# Patient Record
Sex: FEMALE | Race: WHITE | Employment: UNEMPLOYED | URBAN - METROPOLITAN AREA
[De-identification: names, ages, dates, MRNs, and addresses within clinical notes are randomized per-mention and may not be internally consistent; named-entity substitution may affect disease eponyms.]

---

## 2020-05-05 ENCOUNTER — HOSPITAL ENCOUNTER (EMERGENCY)
Age: 50
Discharge: HOME | End: 2020-05-05
Attending: EMERGENCY MEDICINE
Payer: MEDICAID

## 2020-05-05 ENCOUNTER — APPOINTMENT (OUTPATIENT)
Dept: GENERAL RADIOLOGY | Age: 50
End: 2020-05-05
Attending: EMERGENCY MEDICINE
Payer: MEDICAID

## 2020-05-05 ENCOUNTER — HOSPITAL ENCOUNTER (INPATIENT)
Age: 50
LOS: 3 days | Discharge: HOME | End: 2020-05-08
Attending: INTERNAL MEDICINE | Admitting: INTERNAL MEDICINE
Payer: MEDICAID

## 2020-05-05 VITALS
TEMPERATURE: 98 F | HEIGHT: 62 IN | HEART RATE: 104 BPM | WEIGHT: 135 LBS | DIASTOLIC BLOOD PRESSURE: 58 MMHG | BODY MASS INDEX: 24.84 KG/M2 | RESPIRATION RATE: 15 BRPM | OXYGEN SATURATION: 95 % | SYSTOLIC BLOOD PRESSURE: 90 MMHG

## 2020-05-05 DIAGNOSIS — F10.10 ALCOHOL ABUSE: Primary | ICD-10-CM

## 2020-05-05 DIAGNOSIS — F10.930 ALCOHOL WITHDRAWAL SYNDROME WITHOUT COMPLICATION (HCC): Primary | ICD-10-CM

## 2020-05-05 PROBLEM — F10.939 ALCOHOL WITHDRAWAL (HCC): Status: ACTIVE | Noted: 2020-05-05

## 2020-05-05 LAB
ALBUMIN SERPL-MCNC: 4.6 G/DL (ref 3.5–5)
ALBUMIN/GLOB SERPL: 1.2 {RATIO} (ref 1.2–3.5)
ALP SERPL-CCNC: 92 U/L (ref 50–130)
ALT SERPL-CCNC: 50 U/L (ref 12–65)
AMPHET UR QL SCN: NEGATIVE
ANION GAP SERPL CALC-SCNC: 19 MMOL/L (ref 7–16)
AST SERPL-CCNC: 71 U/L (ref 15–37)
ATRIAL RATE: 100 BPM
BARBITURATES UR QL SCN: NEGATIVE
BASOPHILS # BLD: 0 K/UL (ref 0–0.2)
BASOPHILS NFR BLD: 1 % (ref 0–2)
BENZODIAZ UR QL: NEGATIVE
BILIRUB SERPL-MCNC: 0.8 MG/DL (ref 0.2–1.1)
BUN SERPL-MCNC: 11 MG/DL (ref 6–23)
CALCIUM SERPL-MCNC: 9.5 MG/DL (ref 8.3–10.4)
CALCULATED P AXIS, ECG09: 67 DEGREES
CALCULATED R AXIS, ECG10: 39 DEGREES
CALCULATED T AXIS, ECG11: 75 DEGREES
CANNABINOIDS UR QL SCN: NEGATIVE
CHLORIDE SERPL-SCNC: 91 MMOL/L (ref 98–107)
CO2 SERPL-SCNC: 18 MMOL/L (ref 21–32)
COCAINE UR QL SCN: NEGATIVE
CREAT SERPL-MCNC: 0.71 MG/DL (ref 0.6–1)
DIAGNOSIS, 93000: NORMAL
DIFFERENTIAL METHOD BLD: ABNORMAL
EOSINOPHIL # BLD: 0 K/UL (ref 0–0.8)
EOSINOPHIL NFR BLD: 1 % (ref 0.5–7.8)
ERYTHROCYTE [DISTWIDTH] IN BLOOD BY AUTOMATED COUNT: 13.5 % (ref 11.9–14.6)
ETHANOL SERPL-MCNC: 51 MG/DL
GLOBULIN SER CALC-MCNC: 3.8 G/DL (ref 2.3–3.5)
GLUCOSE SERPL-MCNC: 79 MG/DL (ref 65–100)
HCG UR QL: NEGATIVE
HCT VFR BLD AUTO: 42.7 % (ref 35.8–46.3)
HGB BLD-MCNC: 15.2 G/DL (ref 11.7–15.4)
IMM GRANULOCYTES # BLD AUTO: 0 K/UL (ref 0–0.5)
IMM GRANULOCYTES NFR BLD AUTO: 0 % (ref 0–5)
LIPASE SERPL-CCNC: 435 U/L (ref 73–393)
LYMPHOCYTES # BLD: 2 K/UL (ref 0.5–4.6)
LYMPHOCYTES NFR BLD: 36 % (ref 13–44)
MCH RBC QN AUTO: 32.8 PG (ref 26.1–32.9)
MCHC RBC AUTO-ENTMCNC: 35.6 G/DL (ref 31.4–35)
MCV RBC AUTO: 92 FL (ref 79.6–97.8)
METHADONE UR QL: NEGATIVE
MONOCYTES # BLD: 0.4 K/UL (ref 0.1–1.3)
MONOCYTES NFR BLD: 7 % (ref 4–12)
NEUTS SEG # BLD: 3 K/UL (ref 1.7–8.2)
NEUTS SEG NFR BLD: 55 % (ref 43–78)
NRBC # BLD: 0 K/UL (ref 0–0.2)
OPIATES UR QL: NEGATIVE
P-R INTERVAL, ECG05: 156 MS
PCP UR QL: NEGATIVE
PLATELET # BLD AUTO: 204 K/UL (ref 150–450)
PMV BLD AUTO: 9.4 FL (ref 9.4–12.3)
POTASSIUM SERPL-SCNC: 3.3 MMOL/L (ref 3.5–5.1)
PROT SERPL-MCNC: 8.4 G/DL (ref 6.3–8.2)
Q-T INTERVAL, ECG07: 398 MS
QRS DURATION, ECG06: 78 MS
QTC CALCULATION (BEZET), ECG08: 513 MS
RBC # BLD AUTO: 4.64 M/UL (ref 4.05–5.2)
SODIUM SERPL-SCNC: 128 MMOL/L (ref 136–145)
T4 FREE SERPL-MCNC: 0.6 NG/DL (ref 0.78–1.46)
TSH SERPL DL<=0.005 MIU/L-ACNC: 70.5 UIU/ML
VENTRICULAR RATE, ECG03: 100 BPM
WBC # BLD AUTO: 5.5 K/UL (ref 4.3–11.1)

## 2020-05-05 PROCEDURE — 81003 URINALYSIS AUTO W/O SCOPE: CPT

## 2020-05-05 PROCEDURE — 84439 ASSAY OF FREE THYROXINE: CPT

## 2020-05-05 PROCEDURE — 65270000029 HC RM PRIVATE

## 2020-05-05 PROCEDURE — 74018 RADEX ABDOMEN 1 VIEW: CPT

## 2020-05-05 PROCEDURE — 74011250636 HC RX REV CODE- 250/636: Performed by: INTERNAL MEDICINE

## 2020-05-05 PROCEDURE — 80053 COMPREHEN METABOLIC PANEL: CPT

## 2020-05-05 PROCEDURE — 83690 ASSAY OF LIPASE: CPT

## 2020-05-05 PROCEDURE — 99285 EMERGENCY DEPT VISIT HI MDM: CPT

## 2020-05-05 PROCEDURE — 74011250636 HC RX REV CODE- 250/636: Performed by: EMERGENCY MEDICINE

## 2020-05-05 PROCEDURE — 74011000250 HC RX REV CODE- 250: Performed by: INTERNAL MEDICINE

## 2020-05-05 PROCEDURE — 80307 DRUG TEST PRSMV CHEM ANLYZR: CPT

## 2020-05-05 PROCEDURE — 81025 URINE PREGNANCY TEST: CPT

## 2020-05-05 PROCEDURE — 96366 THER/PROPH/DIAG IV INF ADDON: CPT

## 2020-05-05 PROCEDURE — 74011000258 HC RX REV CODE- 258: Performed by: INTERNAL MEDICINE

## 2020-05-05 PROCEDURE — 96361 HYDRATE IV INFUSION ADD-ON: CPT

## 2020-05-05 PROCEDURE — 96365 THER/PROPH/DIAG IV INF INIT: CPT

## 2020-05-05 PROCEDURE — 74011000258 HC RX REV CODE- 258: Performed by: EMERGENCY MEDICINE

## 2020-05-05 PROCEDURE — 85025 COMPLETE CBC W/AUTO DIFF WBC: CPT

## 2020-05-05 PROCEDURE — 84443 ASSAY THYROID STIM HORMONE: CPT

## 2020-05-05 PROCEDURE — 96375 TX/PRO/DX INJ NEW DRUG ADDON: CPT

## 2020-05-05 PROCEDURE — 93005 ELECTROCARDIOGRAM TRACING: CPT | Performed by: EMERGENCY MEDICINE

## 2020-05-05 PROCEDURE — 74011250637 HC RX REV CODE- 250/637: Performed by: INTERNAL MEDICINE

## 2020-05-05 PROCEDURE — 96367 TX/PROPH/DG ADDL SEQ IV INF: CPT

## 2020-05-05 RX ORDER — FOLIC ACID 1 MG/1
1 TABLET ORAL DAILY
Status: DISCONTINUED | OUTPATIENT
Start: 2020-05-05 | End: 2020-05-08 | Stop reason: HOSPADM

## 2020-05-05 RX ORDER — LORAZEPAM 2 MG/ML
1 INJECTION INTRAMUSCULAR
Status: COMPLETED | OUTPATIENT
Start: 2020-05-05 | End: 2020-05-05

## 2020-05-05 RX ORDER — LORAZEPAM 1 MG/1
2 TABLET ORAL
Status: DISCONTINUED | OUTPATIENT
Start: 2020-05-05 | End: 2020-05-08 | Stop reason: HOSPADM

## 2020-05-05 RX ORDER — METOCLOPRAMIDE HYDROCHLORIDE 5 MG/ML
10 INJECTION INTRAMUSCULAR; INTRAVENOUS
Status: COMPLETED | OUTPATIENT
Start: 2020-05-05 | End: 2020-05-05

## 2020-05-05 RX ORDER — LEVOTHYROXINE SODIUM 112 UG/1
112 TABLET ORAL DAILY
COMMUNITY
Start: 2019-02-08

## 2020-05-05 RX ORDER — AMOXICILLIN 250 MG
2 CAPSULE ORAL DAILY
Status: DISCONTINUED | OUTPATIENT
Start: 2020-05-06 | End: 2020-05-08 | Stop reason: HOSPADM

## 2020-05-05 RX ORDER — POLYETHYLENE GLYCOL 3350 17 G/17G
17 POWDER, FOR SOLUTION ORAL DAILY
Status: DISCONTINUED | OUTPATIENT
Start: 2020-05-06 | End: 2020-05-08 | Stop reason: HOSPADM

## 2020-05-05 RX ORDER — LORAZEPAM 1 MG/1
1 TABLET ORAL
Status: DISCONTINUED | OUTPATIENT
Start: 2020-05-05 | End: 2020-05-08 | Stop reason: HOSPADM

## 2020-05-05 RX ORDER — SODIUM CHLORIDE, SODIUM LACTATE, POTASSIUM CHLORIDE, CALCIUM CHLORIDE 600; 310; 30; 20 MG/100ML; MG/100ML; MG/100ML; MG/100ML
75 INJECTION, SOLUTION INTRAVENOUS CONTINUOUS
Status: DISCONTINUED | OUTPATIENT
Start: 2020-05-05 | End: 2020-05-08 | Stop reason: HOSPADM

## 2020-05-05 RX ORDER — ONDANSETRON 2 MG/ML
4 INJECTION INTRAMUSCULAR; INTRAVENOUS
Status: COMPLETED | OUTPATIENT
Start: 2020-05-05 | End: 2020-05-05

## 2020-05-05 RX ORDER — POTASSIUM CHLORIDE 20 MEQ/1
40 TABLET, EXTENDED RELEASE ORAL
Status: COMPLETED | OUTPATIENT
Start: 2020-05-05 | End: 2020-05-05

## 2020-05-05 RX ORDER — ONDANSETRON 2 MG/ML
4 INJECTION INTRAMUSCULAR; INTRAVENOUS
Status: DISCONTINUED | OUTPATIENT
Start: 2020-05-05 | End: 2020-05-08 | Stop reason: HOSPADM

## 2020-05-05 RX ORDER — LORAZEPAM 2 MG/ML
2 INJECTION INTRAMUSCULAR
Status: DISCONTINUED | OUTPATIENT
Start: 2020-05-05 | End: 2020-05-08 | Stop reason: HOSPADM

## 2020-05-05 RX ORDER — DESVENLAFAXINE SUCCINATE 50 MG/1
50 TABLET, EXTENDED RELEASE ORAL
COMMUNITY

## 2020-05-05 RX ORDER — LANOLIN ALCOHOL/MO/W.PET/CERES
100 CREAM (GRAM) TOPICAL DAILY
Status: DISCONTINUED | OUTPATIENT
Start: 2020-05-06 | End: 2020-05-08 | Stop reason: HOSPADM

## 2020-05-05 RX ORDER — THIAMINE HYDROCHLORIDE 100 MG/ML
500 INJECTION, SOLUTION INTRAMUSCULAR; INTRAVENOUS
Status: DISCONTINUED | OUTPATIENT
Start: 2020-05-05 | End: 2020-05-05 | Stop reason: ALTCHOICE

## 2020-05-05 RX ORDER — HALOPERIDOL 5 MG/ML
2 INJECTION INTRAMUSCULAR
Status: DISCONTINUED | OUTPATIENT
Start: 2020-05-05 | End: 2020-05-06

## 2020-05-05 RX ORDER — DEXTROSE, SODIUM CHLORIDE, SODIUM LACTATE, POTASSIUM CHLORIDE, AND CALCIUM CHLORIDE 5; .6; .31; .03; .02 G/100ML; G/100ML; G/100ML; G/100ML; G/100ML
150 INJECTION, SOLUTION INTRAVENOUS CONTINUOUS
Status: DISCONTINUED | OUTPATIENT
Start: 2020-05-05 | End: 2020-05-05 | Stop reason: HOSPADM

## 2020-05-05 RX ADMIN — SODIUM CHLORIDE, SODIUM LACTATE, POTASSIUM CHLORIDE, AND CALCIUM CHLORIDE 150 ML/HR: 600; 310; 30; 20 INJECTION, SOLUTION INTRAVENOUS at 13:35

## 2020-05-05 RX ADMIN — FOLIC ACID 1 MG: 1 TABLET ORAL at 13:54

## 2020-05-05 RX ADMIN — LORAZEPAM 1 MG: 2 INJECTION INTRAMUSCULAR; INTRAVENOUS at 08:59

## 2020-05-05 RX ADMIN — SODIUM CHLORIDE, SODIUM LACTATE, POTASSIUM CHLORIDE, CALCIUM CHLORIDE, AND DEXTROSE MONOHYDRATE 150 ML/HR: 600; 310; 30; 20; 5 INJECTION, SOLUTION INTRAVENOUS at 09:17

## 2020-05-05 RX ADMIN — POTASSIUM CHLORIDE 40 MEQ: 20 TABLET, EXTENDED RELEASE ORAL at 13:54

## 2020-05-05 RX ADMIN — FOLIC ACID: 5 INJECTION, SOLUTION INTRAMUSCULAR; INTRAVENOUS; SUBCUTANEOUS at 15:08

## 2020-05-05 RX ADMIN — SODIUM CHLORIDE, SODIUM LACTATE, POTASSIUM CHLORIDE, AND CALCIUM CHLORIDE 150 ML/HR: 600; 310; 30; 20 INJECTION, SOLUTION INTRAVENOUS at 21:47

## 2020-05-05 RX ADMIN — METOCLOPRAMIDE 10 MG: 5 INJECTION, SOLUTION INTRAMUSCULAR; INTRAVENOUS at 08:58

## 2020-05-05 RX ADMIN — ONDANSETRON 4 MG: 2 INJECTION INTRAMUSCULAR; INTRAVENOUS at 06:33

## 2020-05-05 RX ADMIN — SODIUM CHLORIDE 1000 ML: 900 INJECTION, SOLUTION INTRAVENOUS at 06:33

## 2020-05-05 RX ADMIN — THIAMINE HYDROCHLORIDE 500 MG: 100 INJECTION, SOLUTION INTRAMUSCULAR; INTRAVENOUS at 09:00

## 2020-05-05 NOTE — ED NOTES
Request for bed downtown for admission. Pt resting on stretcher and is aware of care. Denies complaints at present. Pt not able to eat at this time, which RN has told her that.

## 2020-05-05 NOTE — ED TRIAGE NOTES
Pt came by ems. Mask placed on pt. Pt c/o etoh withdrawal. Also c/o SI and abd pain, pt has hx of bowel obstruction and thinks she may have one now.  Pt states she has vomiting for days and no bm in 10 days

## 2020-05-05 NOTE — PROGRESS NOTES
TRANSFER - IN REPORT:    Verbal report received from Stefano N Paco Prasad RN(name) on Zulma Michel  being received from ES(unit) for routine progression of care      Report consisted of patients Situation, Background, Assessment and   Recommendations(SBAR). Information from the following report(s) SBAR was reviewed with the receiving nurse. Opportunity for questions and clarification was provided. Assessment completed upon patients arrival to unit and care assumed.

## 2020-05-05 NOTE — PROGRESS NOTES
CM received consult to assist with ETOH Abuse, needs, and resources. CM attempted to meet with patient to complete assessment. Patient was resting at this time. CM introduced role of CM. Patient does not feel it's a good time to talk and requested CM to follow up at a later time. CM was receptive to this request. CM also informed  [facility] Psych Consult Note is needed. Romelia Albrecht will contact [facility] ED, to request documents to be sent. CM will follow up with patient at appropriate timing. Care Management Interventions  Mode of Transport at Discharge:  Other (see comment)(TBD: Based upon need. )  Transition of Care Consult (CM Consult): Discharge Planning  Discharge Durable Medical Equipment: No  Physical Therapy Consult: No  Occupational Therapy Consult: No  Speech Therapy Consult: No  Current Support Network: Own Home  Discharge Location  Discharge Placement: Unable to determine at this time

## 2020-05-05 NOTE — PROGRESS NOTES
initial visit, met with patient in room, offered support, assurance of continued prayers. Patient states that she is not feeling well, but that she is \"better than yesterday. \"    Tori Sumner  Chaplain WARREN

## 2020-05-05 NOTE — ED NOTES
Pt continues to wait for transport to [facility] for admission. Pt remains on monitors and resting comfortable at this time.

## 2020-05-05 NOTE — H&P
HOSPITALIST H&P/CONSULT  NAME:  Latisha Castillo   Age:  52 y.o.  :   1970   MRN:   [id number]  PCP: None  Consulting MD:  Treatment Team: Attending Provider: Aurea Jacobo MD  HPI:   Pt is a 52 y.o. female with alcoholism, hypothyroidism, h/o alcoholic pancreatitis who presents to the ED with concern for EtOH withdrawal.    Pt reports she is severely depressed and has been on an alcohol binge for at least the past 7 days, drinking a fifth of liquor daily. Last drink was yesterday evening. She awoke this morning with tremors, nausea, diffuse abd pain. Very little PO intake over past 5-6 days. Also with constipation, no BM in more than a week. She reports no active plan to kill herself but does wish she \"would just die\" and was hoping to drink herself to death. In ED, -110s, BP good. Na 128, K 3.3. Lipase 435, TSH 70.5. KUB without acute findings. 10 point ROS done and is as stated in HPI or otherwise negative  Past Medical History:   Diagnosis Date    Gastrointestinal disorder     pancreatitis , bowel obstruction    Hypertension     Ill-defined condition     alcholism     Psychiatric disorder     depression      No past surgical history on file. Cannot display prior to admission medications because the patient has not been admitted in this contact. No Known Allergies   Social History     Tobacco Use    Smoking status: Not on file   Substance Use Topics    Alcohol use: Yes     Comment: daily      Family history reviewed and not pertinent. All history personally reviewed  Objective:   No data found. Physical Exam:  General:    Alert, cooperative, no distress, appears stated age. Head:   Normocephalic, without obvious abnormality, atraumatic. Nose:  Nares normal. No drainage or sinus tenderness. Lungs:   Clear to auscultation bilaterally. No Wheezing or Rhonchi. No rales. Heart:   Tachycardic rate 100s,  no murmur, rub or gallop.   Abdomen:   Soft, mild diffuse abd pain, no rebound or guarding  Extremities: No cyanosis. No edema. No clubbing  Skin:     Texture, turgor normal. No rashes or lesions. Not Jaundiced  Neurologic: Alert and oriented x 3, no focal deficits   Psych:  Flat affect    Data Review:   Recent Results (from the past 24 hour(s))   CBC WITH AUTOMATED DIFF    Collection Time: 05/05/20  6:32 AM   Result Value Ref Range    WBC 5.5 4.3 - 11.1 K/uL    RBC 4.64 4.05 - 5.2 M/uL    HGB 15.2 11.7 - 15.4 g/dL    HCT 42.7 35.8 - 46.3 %    MCV 92.0 79.6 - 97.8 FL    MCH 32.8 26.1 - 32.9 PG    MCHC 35.6 (H) 31.4 - 35.0 g/dL    RDW 13.5 11.9 - 14.6 %    PLATELET 299 565 - 601 K/uL    MPV 9.4 9.4 - 12.3 FL    ABSOLUTE NRBC 0.00 0.0 - 0.2 K/uL    DF AUTOMATED      NEUTROPHILS 55 43 - 78 %    LYMPHOCYTES 36 13 - 44 %    MONOCYTES 7 4.0 - 12.0 %    EOSINOPHILS 1 0.5 - 7.8 %    BASOPHILS 1 0.0 - 2.0 %    IMMATURE GRANULOCYTES 0 0.0 - 5.0 %    ABS. NEUTROPHILS 3.0 1.7 - 8.2 K/UL    ABS. LYMPHOCYTES 2.0 0.5 - 4.6 K/UL    ABS. MONOCYTES 0.4 0.1 - 1.3 K/UL    ABS. EOSINOPHILS 0.0 0.0 - 0.8 K/UL    ABS. BASOPHILS 0.0 0.0 - 0.2 K/UL    ABS. IMM. GRANS. 0.0 0.0 - 0.5 K/UL   METABOLIC PANEL, COMPREHENSIVE    Collection Time: 05/05/20  6:32 AM   Result Value Ref Range    Sodium 128 (L) 136 - 145 mmol/L    Potassium 3.3 (L) 3.5 - 5.1 mmol/L    Chloride 91 (L) 98 - 107 mmol/L    CO2 18 (L) 21 - 32 mmol/L    Anion gap 19 (H) 7 - 16 mmol/L    Glucose 79 65 - 100 mg/dL    BUN 11 6 - 23 MG/DL    Creatinine 0.71 0.6 - 1.0 MG/DL    GFR est AA >60 >60 ml/min/1.73m2    GFR est non-AA >60 >60 ml/min/1.73m2    Calcium 9.5 8.3 - 10.4 MG/DL    Bilirubin, total 0.8 0.2 - 1.1 MG/DL    ALT (SGPT) 50 12 - 65 U/L    AST (SGOT) 71 (H) 15 - 37 U/L    Alk.  phosphatase 92 50 - 130 U/L    Protein, total 8.4 (H) 6.3 - 8.2 g/dL    Albumin 4.6 3.5 - 5.0 g/dL    Globulin 3.8 (H) 2.3 - 3.5 g/dL    A-G Ratio 1.2 1.2 - 3.5     LIPASE    Collection Time: 05/05/20  6:32 AM   Result Value Ref Range    Lipase 435 (H) 73 - 393 U/L   TSH 3RD GENERATION    Collection Time: 05/05/20  6:32 AM   Result Value Ref Range    TSH 70.500 uIU/mL   ETHYL ALCOHOL    Collection Time: 05/05/20  6:32 AM   Result Value Ref Range    ALCOHOL(ETHYL),SERUM 51 MG/DL   DRUG SCREEN, URINE    Collection Time: 05/05/20  9:09 AM   Result Value Ref Range    PCP(PHENCYCLIDINE) Negative      BENZODIAZEPINES Negative      COCAINE Negative      AMPHETAMINES Negative      METHADONE Negative      THC (TH-CANNABINOL) Negative      OPIATES Negative      BARBITURATES Negative     HCG URINE, QL. - POC    Collection Time: 05/05/20  9:13 AM   Result Value Ref Range    Pregnancy test,urine (POC) Negative NEG       Imaging Harlon Nancy /Studies   Xr Abd (kub)    Result Date: 5/5/2020  IMPRESSION: Unremarkable supine view the abdomen. Assessment and Plan:   There are no active hospital problems to display for this patient. EtOH withdrawal  EtOH abuse  - Ativan PRN  - IVFs  - banana bag x 3  - folate, thiamine supplementation  - SW consult  - counseled on EtOH cessation    Hyponatremia  Likely due to decreased PO intake. - IVFs  - follow Na    Minimally elevated lipase  Unclear if this truly represents pancreatitis. - no indication for additional imaging at this time  - monitor symptoms - if abd pain increasing, could consider CT, repeat lipase    Mild abd pain  KUB without significant findings. Possibly related to EtOH abuse, ? Mild pancreatitis. - follow exam and symptoms    Hypothyroidism  TSH 70.5. Has not taken her Synthroid in at least a week. - resume synthroid  - repeat TFTs in 4-6 weeks    Severe depression  No active plan for suicide.   - psych consult    Proph: Lovenox  Code status: full code, friend Gerson Marin is surrogate decision maker  Dispo: > 2 midnights  Risk: high    Signed By: Alberto Keith MD     May 5, 2020

## 2020-05-05 NOTE — ED NOTES
Report from [address] [place] [address]. Pt resting on stretcher and waiting for further orders.

## 2020-05-05 NOTE — ED PROVIDER NOTES
Patient is a chronic alcoholic. States for the past 5 days has not had anything to eat or drink besides alcohol and a small eggroll. Has had nausea and vomiting. No recent bowel movements. No dysuria or hematuria. No vaginal bleeding or discharge. Complains of diffuse abdominal pain. States she has been through withdrawals in the past.    The history is provided by the patient and the EMS personnel. No  was used. Alcohol Problem   Primary symptoms include: weakness. There areno confusion, no loss of consciousness and no seizures present at this time. This is a recurrent problem. The current episode started more than 2 days ago. The problem has been gradually worsening. Suspected agents include alcohol. Associated symptoms include nausea and vomiting. Pertinent negatives include no fever, no bladder incontinence and no bowel incontinence. No past medical history on file. No past surgical history on file. No family history on file.     Social History     Socioeconomic History    Marital status: Not on file     Spouse name: Not on file    Number of children: Not on file    Years of education: Not on file    Highest education level: Not on file   Occupational History    Not on file   Social Needs    Financial resource strain: Not on file    Food insecurity     Worry: Not on file     Inability: Not on file    Transportation needs     Medical: Not on file     Non-medical: Not on file   Tobacco Use    Smoking status: Not on file   Substance and Sexual Activity    Alcohol use: Not on file    Drug use: Not on file    Sexual activity: Not on file   Lifestyle    Physical activity     Days per week: Not on file     Minutes per session: Not on file    Stress: Not on file   Relationships    Social connections     Talks on phone: Not on file     Gets together: Not on file     Attends [employer] service: Not on file     Active member of club or organization: Not on file Attends meetings of clubs or organizations: Not on file     Relationship status: Not on file    Intimate partner violence     Fear of current or ex partner: Not on file     Emotionally abused: Not on file     Physically abused: Not on file     Forced sexual activity: Not on file   Other Topics Concern    Not on file   Social History Narrative    Not on file         ALLERGIES: Patient has no known allergies. Review of Systems   Constitutional: Negative for chills and fever. HENT: Negative for rhinorrhea and sore throat. Eyes: Negative for pain and redness. Respiratory: Negative for chest tightness, shortness of breath and wheezing. Cardiovascular: Negative for chest pain and leg swelling. Gastrointestinal: Positive for abdominal pain, nausea and vomiting. Negative for bowel incontinence and diarrhea. Genitourinary: Negative for bladder incontinence, dysuria and hematuria. Musculoskeletal: Negative for back pain, gait problem, neck pain and neck stiffness. Skin: Negative for color change and rash. Neurological: Positive for weakness. Negative for seizures, loss of consciousness, numbness and headaches. Psychiatric/Behavioral: Negative for confusion. Vitals:    05/05/20 0619   BP: 114/84   Pulse: (!) 121   Resp: 18   SpO2: 98%            Physical Exam  Constitutional:       Appearance: Normal appearance. She is well-developed. HENT:      Head: Normocephalic and atraumatic. Neck:      Musculoskeletal: Normal range of motion and neck supple. Cardiovascular:      Rate and Rhythm: Regular rhythm. Tachycardia present. Pulmonary:      Effort: Pulmonary effort is normal.      Breath sounds: Normal breath sounds. Abdominal:      General: Bowel sounds are normal. There is no distension. Palpations: Abdomen is soft. Tenderness: There is abdominal tenderness. Musculoskeletal: Normal range of motion. General: No swelling. Skin:     General: Skin is warm and dry. Neurological:      General: No focal deficit present. Mental Status: She is alert and oriented to person, place, and time. MDM  Number of Diagnoses or Management Options  Diagnosis management comments: Pt is a chronic alcoholic with NV for past 5 days and diffuse abd pain. Unsure last intake of alcohol. Pt is concerned about withdrawal. No hallucinations. Will check labs and have oncoming doc follow up on results. Amount and/or Complexity of Data Reviewed  Clinical lab tests: ordered and reviewed  Tests in the radiology section of CPT®: ordered and reviewed  Tests in the medicine section of CPT®: ordered and reviewed    Patient Progress  Patient progress: stable    ===================================================================  I have received Zulma Michel from Dr. Melva Quiroga    We discussed the patient's complaint, presentation, vitals and differential diagnosis. We discussed the patient's current status, and response to treatments  We reviewed critical lab values, allergies, and other factors. Issues or problems that are still being evaluated are: alcohol abuse, poor nutrition    Assessment/Plan- await labs, reasses    Bonnie López MD; 5/5/2020 @7:00 AM  ===================================================================    ECG- reviewed in the absence of a cardiology by the ED Physician  Previous ECG: not available for examination  Interpretation: abnormal Quality: fair   Rate: 100 Rhythm: normal sinus rhythm   Ectopy: none QRS: prolonged   Conduction: normal ST segments: normal   Interpretation: NSR with prolonged QT  Bonnie López MD 7:05 AM         ED Course as of May 05 0949   Tue May 05, 2020   0329 Pt is depressed and frustrated; has vague suidical ideations, no plan, hoped the alcohol would just kill her    [GH]      ED Course User Index  [GH] River De La Torre MD       Procedures        Impression and Disposition:      Impression:     ICD-10-CM ICD-9-CM   1. Alcohol abuse F10.10 305.00        Condition at disposition: fair    Disposition: admit    Follow-up:   Follow-up Information    None          Discharge Medications:   Current Discharge Medication List           Miley Pascual MD; 5/5/2020 @7:00 AM

## 2020-05-05 NOTE — ED NOTES
Pt has talked to psych and continues to wait for a room downtown. Pt remains on monitors and waiting for further orders.

## 2020-05-05 NOTE — PROGRESS NOTES
05/05/20 1315   Dual Skin Pressure Injury Assessment   Dual Skin Pressure Injury Assessment WDL   Second Care Provider (Based on [address] [place] [address]) NISA Hodges   Skin Integumentary   Skin Integumentary (WDL) WDL

## 2020-05-05 NOTE — ROUTINE PROCESS
TRANSFER - OUT REPORT: 
 
Verbal report given to Fahad Vickers RN on [employer]  being transferred to [facility] routine progression of care Report consisted of patients Situation, Background, Assessment and  
Recommendations(SBAR). Information from the following report(s) ED Summary was reviewed with the receiving nurse. Lines:  
Peripheral IV 05/05/20 Left Antecubital (Active) Site Assessment Clean, dry, & intact 5/5/2020  6:32 AM  
Phlebitis Assessment 0 5/5/2020  6:32 AM  
Infiltration Assessment 0 5/5/2020  6:32 AM  
Dressing Status Clean, dry, & intact 5/5/2020  6:32 AM  
Dressing Type Transparent 5/5/2020  6:32 AM  
Hub Color/Line Status Patent; Flushed 5/5/2020  6:32 AM  
Action Taken Blood drawn 5/5/2020  6:32 AM  
  
 
Opportunity for questions and clarification was provided. Patient transported with: 
 Monitor with Ada Phillips

## 2020-05-06 PROBLEM — E03.9 ACQUIRED HYPOTHYROIDISM: Status: ACTIVE | Noted: 2020-05-06

## 2020-05-06 PROBLEM — R45.851 SUICIDAL IDEATION: Status: ACTIVE | Noted: 2020-05-06

## 2020-05-06 LAB
ANION GAP SERPL CALC-SCNC: 6 MMOL/L (ref 7–16)
APPEARANCE UR: NORMAL
BILIRUB UR QL: NEGATIVE
BUN SERPL-MCNC: 14 MG/DL (ref 6–23)
CALCIUM SERPL-MCNC: 8.6 MG/DL (ref 8.3–10.4)
CHLORIDE SERPL-SCNC: 104 MMOL/L (ref 98–107)
CO2 SERPL-SCNC: 27 MMOL/L (ref 21–32)
COLOR UR: YELLOW
CREAT SERPL-MCNC: 0.5 MG/DL (ref 0.6–1)
GLUCOSE SERPL-MCNC: 86 MG/DL (ref 65–100)
GLUCOSE UR STRIP.AUTO-MCNC: NEGATIVE MG/DL
HGB UR QL STRIP: NEGATIVE
KETONES UR QL STRIP.AUTO: NEGATIVE MG/DL
LEUKOCYTE ESTERASE UR QL STRIP.AUTO: NEGATIVE
MAGNESIUM SERPL-MCNC: 2.2 MG/DL (ref 1.8–2.4)
NITRITE UR QL STRIP.AUTO: NEGATIVE
PH UR STRIP: 6.5 [PH] (ref 5–9)
POTASSIUM SERPL-SCNC: 3.2 MMOL/L (ref 3.5–5.1)
PROT UR STRIP-MCNC: NEGATIVE MG/DL
SODIUM SERPL-SCNC: 137 MMOL/L (ref 136–145)
SP GR UR REFRACTOMETRY: 1.01 (ref 1–1.02)
UROBILINOGEN UR QL STRIP.AUTO: 0.2 EU/DL (ref 0.2–1)

## 2020-05-06 PROCEDURE — 74011250636 HC RX REV CODE- 250/636: Performed by: INTERNAL MEDICINE

## 2020-05-06 PROCEDURE — 74011250637 HC RX REV CODE- 250/637: Performed by: INTERNAL MEDICINE

## 2020-05-06 PROCEDURE — 81003 URINALYSIS AUTO W/O SCOPE: CPT

## 2020-05-06 PROCEDURE — 36415 COLL VENOUS BLD VENIPUNCTURE: CPT

## 2020-05-06 PROCEDURE — 80048 BASIC METABOLIC PNL TOTAL CA: CPT

## 2020-05-06 PROCEDURE — 83735 ASSAY OF MAGNESIUM: CPT

## 2020-05-06 PROCEDURE — 65270000029 HC RM PRIVATE

## 2020-05-06 RX ORDER — LEVOFLOXACIN 500 MG/1
500 TABLET, FILM COATED ORAL EVERY 24 HOURS
Status: DISCONTINUED | OUTPATIENT
Start: 2020-05-06 | End: 2020-05-08 | Stop reason: HOSPADM

## 2020-05-06 RX ORDER — CHLORDIAZEPOXIDE HYDROCHLORIDE 5 MG/1
10 CAPSULE, GELATIN COATED ORAL 4 TIMES DAILY
Status: DISCONTINUED | OUTPATIENT
Start: 2020-05-06 | End: 2020-05-08 | Stop reason: HOSPADM

## 2020-05-06 RX ORDER — POTASSIUM CHLORIDE 20 MEQ/1
20 TABLET, EXTENDED RELEASE ORAL 2 TIMES DAILY
Status: DISCONTINUED | OUTPATIENT
Start: 2020-05-06 | End: 2020-05-08 | Stop reason: HOSPADM

## 2020-05-06 RX ORDER — LEVOTHYROXINE SODIUM 112 UG/1
112 TABLET ORAL DAILY
Status: DISCONTINUED | OUTPATIENT
Start: 2020-05-06 | End: 2020-05-08 | Stop reason: HOSPADM

## 2020-05-06 RX ORDER — DESVENLAFAXINE SUCCINATE 50 MG/1
50 TABLET, EXTENDED RELEASE ORAL DAILY
Status: DISCONTINUED | OUTPATIENT
Start: 2020-05-07 | End: 2020-05-08 | Stop reason: HOSPADM

## 2020-05-06 RX ORDER — IBUPROFEN 200 MG
1 TABLET ORAL EVERY 24 HOURS
Status: DISCONTINUED | OUTPATIENT
Start: 2020-05-06 | End: 2020-05-08 | Stop reason: HOSPADM

## 2020-05-06 RX ADMIN — LORAZEPAM 2 MG: 2 INJECTION INTRAMUSCULAR; INTRAVENOUS at 08:50

## 2020-05-06 RX ADMIN — ONDANSETRON 4 MG: 2 INJECTION INTRAMUSCULAR; INTRAVENOUS at 04:56

## 2020-05-06 RX ADMIN — SODIUM CHLORIDE, SODIUM LACTATE, POTASSIUM CHLORIDE, AND CALCIUM CHLORIDE 150 ML/HR: 600; 310; 30; 20 INJECTION, SOLUTION INTRAVENOUS at 04:53

## 2020-05-06 RX ADMIN — CHLORDIAZEPOXIDE HYDROCHLORIDE 10 MG: 5 CAPSULE ORAL at 21:04

## 2020-05-06 RX ADMIN — LEVOFLOXACIN 500 MG: 500 TABLET, FILM COATED ORAL at 16:49

## 2020-05-06 RX ADMIN — LORAZEPAM 2 MG: 2 INJECTION INTRAMUSCULAR; INTRAVENOUS at 14:32

## 2020-05-06 RX ADMIN — POTASSIUM CHLORIDE 20 MEQ: 20 TABLET, EXTENDED RELEASE ORAL at 17:02

## 2020-05-06 RX ADMIN — POTASSIUM CHLORIDE 20 MEQ: 20 TABLET, EXTENDED RELEASE ORAL at 12:30

## 2020-05-06 RX ADMIN — CHLORDIAZEPOXIDE HYDROCHLORIDE 10 MG: 5 CAPSULE ORAL at 17:02

## 2020-05-06 RX ADMIN — ONDANSETRON 4 MG: 2 INJECTION INTRAMUSCULAR; INTRAVENOUS at 14:32

## 2020-05-06 RX ADMIN — Medication 100 MG: at 08:38

## 2020-05-06 RX ADMIN — POLYETHYLENE GLYCOL 3350 17 G: 17 POWDER, FOR SOLUTION ORAL at 08:38

## 2020-05-06 RX ADMIN — FOLIC ACID 1 MG: 1 TABLET ORAL at 08:38

## 2020-05-06 RX ADMIN — LORAZEPAM 2 MG: 2 INJECTION INTRAMUSCULAR; INTRAVENOUS at 22:19

## 2020-05-06 RX ADMIN — CHLORDIAZEPOXIDE HYDROCHLORIDE 10 MG: 5 CAPSULE ORAL at 08:37

## 2020-05-06 RX ADMIN — LACTULOSE 45 ML: 20 SOLUTION ORAL at 16:49

## 2020-05-06 RX ADMIN — SENNOSIDES AND DOCUSATE SODIUM 2 TABLET: 8.6; 5 TABLET ORAL at 08:38

## 2020-05-06 RX ADMIN — LEVOTHYROXINE SODIUM 112 MCG: 0.11 TABLET ORAL at 12:30

## 2020-05-06 RX ADMIN — CHLORDIAZEPOXIDE HYDROCHLORIDE 10 MG: 5 CAPSULE ORAL at 12:30

## 2020-05-06 RX ADMIN — LORAZEPAM 2 MG: 1 TABLET ORAL at 19:06

## 2020-05-06 NOTE — PROGRESS NOTES
Hourly rounds performed. All needs met. Bed is in low position and call light is within reach. Pt complained of nausea this AM. Nausea managed per MAR. Will continue to monitor and report oncoming nurse.

## 2020-05-06 NOTE — PROGRESS NOTES
Progress Note    Patient: Any Morrison MRN: [id number]  SSN: [id number]    YOB: 1970  Age: 52 y.o. Sex: female      Admit Date: 5/5/2020    LOS: 1 day     Subjective:     Patient with history of alcoholism, hypothyroidism, history of alcoholic pancreatitis. Admitted due to alcohol withdrawal and also stated that she had suidical thought and tried to drink herself to death. She still confirms with me today that she has suicidal thought now. She is eating well this morning. Objective:     Vitals:    05/05/20 2342 05/06/20 0345 05/06/20 0634 05/06/20 0808   BP: 100/61 127/74 116/78 117/75   Pulse: (!) 101 (!) 109 73 73   Resp: 18 18 16 19   Temp: 98 °F (36.7 °C) 98.4 °F (36.9 °C) 98.5 °F (36.9 °C) 98.1 °F (36.7 °C)   SpO2: 98% 96% 95% 96%        Intake and Output:  Current Shift: No intake/output data recorded. Last three shifts: 05/04 1901 - 05/06 0700  In: 240 [P.O.:240]  Out: -     Physical Exam:   General:                    The patient is a middle aged female in no acute distress. She is talking and answering well. She is eating. Sometimes, took time to answer. No tremor. Head:                                   Normocephalic/atraumatic. Eyes:                                   No palpebral pallor or scleral icterus. ENT:                                    External auricular and nasal exam within normal limits. Mucous membranes are moist.  Neck:                                   Supple, non-tender, no JVD. Lungs:                       Clear to auscultation bilaterally without wheezes or crackles. No respiratory distress or accessory muscle use. Heart:                                  Regular rate and rhythm, without murmurs, rubs, or gallops. Abdomen:                  Soft, non-tender, non-distended with normoactive bowel sounds.    Genitourinary:           No tenderness over the bladder or bilateral CVAs. Extremities:               Without clubbing, cyanosis, or edema. Skin:                                    Normal color, texture, and turgor. No rashes, lesions, or jaundice. Pulses:                      Radial and dorsalis pedis pulses present 2+ bilaterally. Capillary refill <2s. Neurologic:                CN II-XII grossly intact and symmetrical.                                               Moving all four extremities well with no focal deficits. Psychiatric:                Pleasant demeanor, appropriate affect. Alert and oriented x 3        Lab/Data Review:    Recent Results (from the past 24 hour(s))   METABOLIC PANEL, BASIC    Collection Time: 05/06/20  7:40 AM   Result Value Ref Range    Sodium 137 136 - 145 mmol/L    Potassium 3.2 (L) 3.5 - 5.1 mmol/L    Chloride 104 98 - 107 mmol/L    CO2 27 21 - 32 mmol/L    Anion gap 6 (L) 7 - 16 mmol/L    Glucose 86 65 - 100 mg/dL    BUN 14 6 - 23 MG/DL    Creatinine 0.50 (L) 0.6 - 1.0 MG/DL    GFR est AA >60 >60 ml/min/1.73m2    GFR est non-AA >60 >60 ml/min/1.73m2    Calcium 8.6 8.3 - 10.4 MG/DL   MAGNESIUM    Collection Time: 05/06/20  7:40 AM   Result Value Ref Range    Magnesium 2.2 1.8 - 2.4 mg/dL       XR abdomen   5-5-2020  IMPRESSION:     Unremarkable supine view the abdomen.       Current Facility-Administered Medications:     chlordiazePOXIDE (LIBRIUM) capsule 10 mg, 10 mg, Oral, QID, Maria M Willis MD, 10 mg at 05/06/20 0837    lactulose (CHRONULAC) 10 gram/15 mL solution 45 mL, 30 g, Oral, BID PRN, Ellen Leos MD    levothyroxine (SYNTHROID) tablet 112 mcg, 112 mcg, Oral, DAILY, Ellen Leos MD    LORazepam (ATIVAN) tablet 1 mg, 1 mg, Oral, Q1H PRN, Aditi Palafox MD    LORazepam (ATIVAN) tablet 2 mg, 2 mg, Oral, Q1H PRN, Aditi Palafox MD    LORazepam (ATIVAN) injection 2 mg, 2 mg, IntraVENous, Q2H PRN, Aditi Palafox MD    folic acid (Ebonie Minor) tablet 1 mg, 1 mg, Oral, DAILY, Carlos Palafox MD, 1 mg at 05/06/20 0838    thiamine HCL (B-1) tablet 100 mg, 100 mg, Oral, DAILY, Carlos Palafox MD, 100 mg at 05/06/20 0838    ondansetron (ZOFRAN) injection 4 mg, 4 mg, IntraVENous, Q4H PRN, Carlos Palafox MD, 4 mg at 05/06/20 0456    lactated Ringers infusion, 75 mL/hr, IntraVENous, CONTINUOUS, Ellen Leos MD, Last Rate: 150 mL/hr at 05/06/20 0453, 150 mL/hr at 05/06/20 0453    polyethylene glycol (MIRALAX) packet 17 g, 17 g, Oral, DAILY, Ellen Leos MD, 17 g at 05/06/20 0838    senna-docusate (PERICOLACE) 8.6-50 mg per tablet 2 Tab, 2 Tab, Oral, DAILY, Carlos Palafox MD, 2 Tab at 05/06/20 6174      Assessment:     Principal Problem:    Alcohol withdrawal ([facility] [address] (5/5/2020)    Active Problems:    Suicidal ideation (5/6/2020)      Acquired hypothyroidism (5/6/2020)        Plan:     Alcohol withdrawal on admission   Will continue with Librium   Give folic acid, thiamine. Monitor. Suicidal ideation   Ask psychiatry to see     Hypothyroidism   Continue home medications. Smoking. I advised patient to quit. Nicotine patch.        I have discussed the plan of care with patient and care team.    DVT prophylaxis : SCD     Signed By: Willie Villareal MD     May 6, 2020

## 2020-05-06 NOTE — PROGRESS NOTES
Patient on 1:1 with sitter. Patient very anxious and tearful this shift. Ativan given prn. Hourly rounds performed this shift. Bed lowered and locked, side rails x2, and call light in reach. All patient needs are met at this time. Bedside shift report will given to oncoming nurse.

## 2020-05-06 NOTE — PROGRESS NOTES
Interdisciplinary Rounds completed. Nursing, Case Management, and Physician  present. Plan of care reviewed and updated. Pt is a moderate suicide risk not requiring sitter, however, MD ordered 1:1 observation for safety.

## 2020-05-07 LAB
ANION GAP SERPL CALC-SCNC: 7 MMOL/L (ref 7–16)
BUN SERPL-MCNC: 8 MG/DL (ref 6–23)
CALCIUM SERPL-MCNC: 9.1 MG/DL (ref 8.3–10.4)
CHLORIDE SERPL-SCNC: 104 MMOL/L (ref 98–107)
CO2 SERPL-SCNC: 29 MMOL/L (ref 21–32)
CREAT SERPL-MCNC: 0.55 MG/DL (ref 0.6–1)
GLUCOSE SERPL-MCNC: 87 MG/DL (ref 65–100)
MAGNESIUM SERPL-MCNC: 2 MG/DL (ref 1.8–2.4)
POTASSIUM SERPL-SCNC: 3.5 MMOL/L (ref 3.5–5.1)
SODIUM SERPL-SCNC: 140 MMOL/L (ref 136–145)

## 2020-05-07 PROCEDURE — 74011250636 HC RX REV CODE- 250/636: Performed by: INTERNAL MEDICINE

## 2020-05-07 PROCEDURE — 83735 ASSAY OF MAGNESIUM: CPT

## 2020-05-07 PROCEDURE — 65270000029 HC RM PRIVATE

## 2020-05-07 PROCEDURE — 74011250637 HC RX REV CODE- 250/637: Performed by: INTERNAL MEDICINE

## 2020-05-07 PROCEDURE — 36415 COLL VENOUS BLD VENIPUNCTURE: CPT

## 2020-05-07 PROCEDURE — 80048 BASIC METABOLIC PNL TOTAL CA: CPT

## 2020-05-07 RX ADMIN — LORAZEPAM 2 MG: 2 INJECTION INTRAMUSCULAR; INTRAVENOUS at 08:35

## 2020-05-07 RX ADMIN — ONDANSETRON 4 MG: 2 INJECTION INTRAMUSCULAR; INTRAVENOUS at 19:06

## 2020-05-07 RX ADMIN — CHLORDIAZEPOXIDE HYDROCHLORIDE 10 MG: 5 CAPSULE ORAL at 22:00

## 2020-05-07 RX ADMIN — POTASSIUM CHLORIDE 20 MEQ: 20 TABLET, EXTENDED RELEASE ORAL at 08:19

## 2020-05-07 RX ADMIN — LORAZEPAM 2 MG: 2 INJECTION INTRAMUSCULAR; INTRAVENOUS at 23:53

## 2020-05-07 RX ADMIN — LORAZEPAM 2 MG: 2 INJECTION INTRAMUSCULAR; INTRAVENOUS at 12:03

## 2020-05-07 RX ADMIN — Medication 100 MG: at 08:19

## 2020-05-07 RX ADMIN — CHLORDIAZEPOXIDE HYDROCHLORIDE 10 MG: 5 CAPSULE ORAL at 08:18

## 2020-05-07 RX ADMIN — LEVOTHYROXINE SODIUM 112 MCG: 0.11 TABLET ORAL at 08:19

## 2020-05-07 RX ADMIN — POTASSIUM CHLORIDE 20 MEQ: 20 TABLET, EXTENDED RELEASE ORAL at 17:02

## 2020-05-07 RX ADMIN — ONDANSETRON 4 MG: 2 INJECTION INTRAMUSCULAR; INTRAVENOUS at 08:18

## 2020-05-07 RX ADMIN — SODIUM CHLORIDE, SODIUM LACTATE, POTASSIUM CHLORIDE, AND CALCIUM CHLORIDE 75 ML/HR: 600; 310; 30; 20 INJECTION, SOLUTION INTRAVENOUS at 05:53

## 2020-05-07 RX ADMIN — CHLORDIAZEPOXIDE HYDROCHLORIDE 10 MG: 5 CAPSULE ORAL at 17:02

## 2020-05-07 RX ADMIN — POLYETHYLENE GLYCOL 3350 17 G: 17 POWDER, FOR SOLUTION ORAL at 08:19

## 2020-05-07 RX ADMIN — ONDANSETRON 4 MG: 2 INJECTION INTRAMUSCULAR; INTRAVENOUS at 12:03

## 2020-05-07 RX ADMIN — FOLIC ACID 1 MG: 1 TABLET ORAL at 08:18

## 2020-05-07 RX ADMIN — LORAZEPAM 2 MG: 2 INJECTION INTRAMUSCULAR; INTRAVENOUS at 19:06

## 2020-05-07 RX ADMIN — SODIUM CHLORIDE, SODIUM LACTATE, POTASSIUM CHLORIDE, AND CALCIUM CHLORIDE 75 ML/HR: 600; 310; 30; 20 INJECTION, SOLUTION INTRAVENOUS at 20:00

## 2020-05-07 RX ADMIN — LEVOFLOXACIN 500 MG: 500 TABLET, FILM COATED ORAL at 14:20

## 2020-05-07 RX ADMIN — SENNOSIDES AND DOCUSATE SODIUM 2 TABLET: 8.6; 5 TABLET ORAL at 08:18

## 2020-05-07 NOTE — PROGRESS NOTES
Hourly rounds completed, pt denies needs at this time. Pt still with suicidal thoughts . Asking for Ativan and Zofran. Lunch time Librium held for sedation.

## 2020-05-07 NOTE — PROGRESS NOTES
CM met with patient to complete assessment. Patient presented alert and oriented. Demographic information confirmed by the patient. Patient states she was staying at [facility] on [address] prior to being admitted. Patient also informed CM, she is currently living out of her care. Patient states she been experiencing difficulty with completing ADL's. Patient states she does not have any support to assist with care needs. The patient receives her medications from [facility]. Patient voiced no difficulty with obtaining medications in the community. CM discussed discharge planning. Per chart review, consult to psychiatry was placed on 5/6/2020. Per psych consult note, the patient has been identified as a voluntary for initial legal status and has been recommended for inpatient psych hospitalization. CM asked if the patient would like any resources in reference to ETOH abuse. Patient declined services at this time and requested assistance with housing only. CM asked if the patient would potentially discharge to a shelter upon discharge. Patient states I guess but would like to have information regarding independent living. CM provided housing/shelter information. CM continues to follow the patient. Update: CM was notified by , patient requested to speak with CM. CM met with patient to discuss discharge needs. Patient states she will discharge to her friend's [facility], when medically stable. CM was receptive to this information. CM continues to follow the patient.

## 2020-05-07 NOTE — PROGRESS NOTES
Interdisciplinary Rounds completed. Nursing, Case Management, and Physician  present. Plan of care reviewed and updated. Probable d/c in am. MD wants to continue 1:1 sitters while hospitalized.

## 2020-05-07 NOTE — PROGRESS NOTES
Progress Note    Patient: Linda Mckenzie MRN: [id number]  SSN: [id number]    YOB: 1970  Age: 52 y.o. Sex: female      Admit Date: 5/5/2020    LOS: 2 days     Subjective:     Patient with history of alcoholism, hypothyroidism, history of alcoholic pancreatitis. Admitted due to alcohol withdrawal and also stated that she had suidical thought and tried to drink herself to death. Patient has been on one to one watch. Able to eat better. Sleep most of the time. Objective:     Vitals:    05/06/20 1854 05/06/20 2318 05/07/20 0447 05/07/20 0827   BP: 117/82 106/72 97/65 109/81   Pulse: 82 88 86 100   Resp: 16 16 16 15   Temp: 98.2 °F (36.8 °C) 98.2 °F (36.8 °C) 98 °F (36.7 °C) 97.4 °F (36.3 °C)   SpO2: 96% 97% 97% 98%        Intake and Output:  Current Shift: No intake/output data recorded. Last three shifts: 05/05 1901 - 05/07 0700  In: 240 [P.O.:240]  Out: 700 [Urine:700]    Physical Exam:   General:                    The patient is a middle aged female in no acute distress. She is talking and answering well. She is resting. Head:                                   Normocephalic/atraumatic. Eyes:                                   No palpebral pallor or scleral icterus. ENT:                                    External auricular and nasal exam within normal limits. Mucous membranes are moist.  Neck:                                   Supple, non-tender, no JVD. Lungs:                       Clear to auscultation bilaterally without wheezes or crackles. No respiratory distress or accessory muscle use. Heart:                                  Regular rate and rhythm, without murmurs, rubs, or gallops. Abdomen:                  Soft, non-tender, non-distended with normoactive bowel sounds. Genitourinary:           No tenderness over the bladder or bilateral CVAs.   Extremities: Without clubbing, cyanosis, or edema. Skin:                                    Normal color, texture, and turgor. No rashes, lesions, or jaundice. Pulses:                      Radial and dorsalis pedis pulses present 2+ bilaterally. Capillary refill <2s. Neurologic:                CN II-XII grossly intact and symmetrical.                                               Moving all four extremities well with no focal deficits. Psychiatric:                Pleasant demeanor, appropriate affect. Alert and oriented x 3        Lab/Data Review:    Recent Results (from the past 24 hour(s))   URINALYSIS W/ RFLX MICROSCOPIC    Collection Time: 05/06/20  2:50 PM   Result Value Ref Range    Color YELLOW      Appearance CLOUDY      Specific gravity 1.015 1.001 - 1.023      pH (UA) 6.5 5.0 - 9.0      Protein Negative NEG mg/dL    Glucose Negative mg/dL    Ketone Negative NEG mg/dL    Bilirubin Negative NEG      Blood Negative NEG      Urobilinogen 0.2 0.2 - 1.0 EU/dL    Nitrites Negative NEG      Leukocyte Esterase Negative NEG     METABOLIC PANEL, BASIC    Collection Time: 05/07/20  6:09 AM   Result Value Ref Range    Sodium 140 136 - 145 mmol/L    Potassium 3.5 3.5 - 5.1 mmol/L    Chloride 104 98 - 107 mmol/L    CO2 29 21 - 32 mmol/L    Anion gap 7 7 - 16 mmol/L    Glucose 87 65 - 100 mg/dL    BUN 8 6 - 23 MG/DL    Creatinine 0.55 (L) 0.6 - 1.0 MG/DL    GFR est AA >60 >60 ml/min/1.73m2    GFR est non-AA >60 >60 ml/min/1.73m2    Calcium 9.1 8.3 - 10.4 MG/DL   MAGNESIUM    Collection Time: 05/07/20  6:09 AM   Result Value Ref Range    Magnesium 2.0 1.8 - 2.4 mg/dL       XR abdomen   5-5-2020  IMPRESSION:     Unremarkable supine view the abdomen.       Current Facility-Administered Medications:     chlordiazePOXIDE (LIBRIUM) capsule 10 mg, 10 mg, Oral, QID, Ellen Leos MD, 10 mg at 05/07/20 0818    lactulose (CHRONULAC) 10 gram/15 mL solution 45 mL, 30 g, Oral, BID PRN, Aiyana Howell MD, 45 mL at 05/06/20 1649    levothyroxine (SYNTHROID) tablet 112 mcg, 112 mcg, Oral, DAILY, Ellen Leos MD, 112 mcg at 05/07/20 0819    nicotine (NICODERM CQ) 14 mg/24 hr patch 1 Patch, 1 Patch, TransDERmal, Q24H, Aiyana Howell MD, 1 Patch at 05/06/20 1230    potassium chloride (K-DUR, KLOR-CON) SR tablet 20 mEq, 20 mEq, Oral, BID, Ellen Leos MD, 20 mEq at 05/07/20 0819    desvenlafaxine succinate (PRISTIQ) ER tablet 50 mg (Patient Supplied), 50 mg, Oral, DAILY, Aiyana Howell MD    levoFLOXacin (LEVAQUIN) tablet 500 mg, 500 mg, Oral, Q24H, Ellen Leos MD, 500 mg at 05/06/20 1649    LORazepam (ATIVAN) tablet 1 mg, 1 mg, Oral, Q1H PRN, Jenn Palafox MD    LORazepam (ATIVAN) tablet 2 mg, 2 mg, Oral, Q1H PRN, Jenn Palafox MD, 2 mg at 05/06/20 1906    LORazepam (ATIVAN) injection 2 mg, 2 mg, IntraVENous, Q2H PRN, Jenn Palafox MD, 2 mg at 51/07/86 8076    folic acid (FOLVITE) tablet 1 mg, 1 mg, Oral, DAILY, Jenn Palafox MD, 1 mg at 05/07/20 0818    thiamine HCL (B-1) tablet 100 mg, 100 mg, Oral, DAILY, Jenn Palafox MD, 100 mg at 05/07/20 0819    ondansetron (ZOFRAN) injection 4 mg, 4 mg, IntraVENous, Q4H PRN, Jenn Palafox MD, 4 mg at 05/07/20 0818    lactated Ringers infusion, 75 mL/hr, IntraVENous, CONTINUOUS, Ellen Leos MD, Last Rate: 75 mL/hr at 05/07/20 0553, 75 mL/hr at 05/07/20 0553    polyethylene glycol (MIRALAX) packet 17 g, 17 g, Oral, DAILY, Ellen Leos MD, 17 g at 05/07/20 0819    senna-docusate (PERICOLACE) 8.6-50 mg per tablet 2 Tab, 2 Tab, Oral, DAILY, Jenn Palafox MD, 2 Tab at 05/07/20 0818      Assessment:     Principal Problem:    Alcohol withdrawal ([facility] [address] (5/5/2020)    Active Problems:    Suicidal ideation (5/6/2020)      Acquired hypothyroidism (5/6/2020)        Plan:     Alcohol withdrawal on admission   Will continue with Librium, but cut back the dosage. Give folic acid, thiamine. Monitor. Suicidal ideation   Psychiatry saw patient. Follow recommendation. Hypothyroidism   Continue home medications. Smoking. I advised patient to quit. Nicotine patch. I have discussed the plan of care with patient and care team.    DVT prophylaxis : SCD     Disposition plan :  CM is helping.     Signed By: Violetta Petersen MD     May 7, 2020

## 2020-05-07 NOTE — PROGRESS NOTES
Hourly rounds performed. All needs met. Bed is in low position and call light is within reach. Pt had some tearful moments during shift and wad extremely anxious. Ativan given per MAR. Will continue to monitor and report to oncoming nurse.

## 2020-05-08 ENCOUNTER — APPOINTMENT (OUTPATIENT)
Dept: CT IMAGING | Age: 50
DRG: 241 | End: 2020-05-08
Attending: INTERNAL MEDICINE
Payer: MEDICAID

## 2020-05-08 ENCOUNTER — HOSPITAL ENCOUNTER (INPATIENT)
Age: 50
LOS: 1 days | Discharge: LEFT BEFORE DISCHARGE | DRG: 241 | End: 2020-05-12
Attending: EMERGENCY MEDICINE | Admitting: INTERNAL MEDICINE
Payer: MEDICAID

## 2020-05-08 VITALS
SYSTOLIC BLOOD PRESSURE: 95 MMHG | HEART RATE: 90 BPM | DIASTOLIC BLOOD PRESSURE: 70 MMHG | TEMPERATURE: 98.2 F | RESPIRATION RATE: 14 BRPM | OXYGEN SATURATION: 94 %

## 2020-05-08 DIAGNOSIS — K86.0 ALCOHOL-INDUCED CHRONIC PANCREATITIS (HCC): Primary | ICD-10-CM

## 2020-05-08 PROBLEM — F10.10 ALCOHOL ABUSE: Status: ACTIVE | Noted: 2020-05-08

## 2020-05-08 PROBLEM — F32.9 MDD (MAJOR DEPRESSIVE DISORDER): Status: ACTIVE | Noted: 2020-05-08

## 2020-05-08 PROBLEM — R11.2 NAUSEA AND VOMITING: Status: ACTIVE | Noted: 2020-05-08

## 2020-05-08 PROBLEM — K85.20 ACUTE ALCOHOLIC PANCREATITIS: Status: ACTIVE | Noted: 2020-05-08

## 2020-05-08 PROBLEM — R10.9 ABDOMINAL PAIN: Status: ACTIVE | Noted: 2020-05-08

## 2020-05-08 LAB
ALBUMIN SERPL-MCNC: 3.4 G/DL (ref 3.5–5)
ALBUMIN/GLOB SERPL: 1 {RATIO} (ref 1.2–3.5)
ALP SERPL-CCNC: 63 U/L (ref 50–136)
ALT SERPL-CCNC: 91 U/L (ref 12–65)
ANION GAP SERPL CALC-SCNC: 8 MMOL/L (ref 7–16)
AST SERPL-CCNC: 167 U/L (ref 15–37)
BASOPHILS # BLD: 0 K/UL (ref 0–0.2)
BASOPHILS NFR BLD: 0 % (ref 0–2)
BILIRUB SERPL-MCNC: 0.2 MG/DL (ref 0.2–1.1)
BUN SERPL-MCNC: 5 MG/DL (ref 6–23)
CALCIUM SERPL-MCNC: 9.5 MG/DL (ref 8.3–10.4)
CHLORIDE SERPL-SCNC: 108 MMOL/L (ref 98–107)
CO2 SERPL-SCNC: 26 MMOL/L (ref 21–32)
CREAT SERPL-MCNC: 0.58 MG/DL (ref 0.6–1)
DIFFERENTIAL METHOD BLD: ABNORMAL
EOSINOPHIL # BLD: 0 K/UL (ref 0–0.8)
EOSINOPHIL NFR BLD: 0 % (ref 0.5–7.8)
ERYTHROCYTE [DISTWIDTH] IN BLOOD BY AUTOMATED COUNT: 14 % (ref 11.9–14.6)
ETHANOL SERPL-MCNC: 109 MG/DL
GLOBULIN SER CALC-MCNC: 3.3 G/DL (ref 2.3–3.5)
GLUCOSE SERPL-MCNC: 94 MG/DL (ref 65–100)
HCT VFR BLD AUTO: 37.1 % (ref 35.8–46.3)
HGB BLD-MCNC: 12.4 G/DL (ref 11.7–15.4)
IMM GRANULOCYTES # BLD AUTO: 0 K/UL (ref 0–0.5)
IMM GRANULOCYTES NFR BLD AUTO: 0 % (ref 0–5)
LIPASE SERPL-CCNC: 575 U/L (ref 73–393)
LYMPHOCYTES # BLD: 1.4 K/UL (ref 0.5–4.6)
LYMPHOCYTES NFR BLD: 32 % (ref 13–44)
MCH RBC QN AUTO: 32.9 PG (ref 26.1–32.9)
MCHC RBC AUTO-ENTMCNC: 33.4 G/DL (ref 31.4–35)
MCV RBC AUTO: 98.4 FL (ref 79.6–97.8)
MONOCYTES # BLD: 0.2 K/UL (ref 0.1–1.3)
MONOCYTES NFR BLD: 5 % (ref 4–12)
NEUTS SEG # BLD: 2.7 K/UL (ref 1.7–8.2)
NEUTS SEG NFR BLD: 63 % (ref 43–78)
NRBC # BLD: 0 K/UL (ref 0–0.2)
PLATELET # BLD AUTO: 111 K/UL (ref 150–450)
PMV BLD AUTO: 9.8 FL (ref 9.4–12.3)
POTASSIUM SERPL-SCNC: 4.8 MMOL/L (ref 3.5–5.1)
PROT SERPL-MCNC: 6.7 G/DL (ref 6.3–8.2)
RBC # BLD AUTO: 3.77 M/UL (ref 4.05–5.2)
SODIUM SERPL-SCNC: 142 MMOL/L (ref 136–145)
WBC # BLD AUTO: 4.3 K/UL (ref 4.3–11.1)

## 2020-05-08 PROCEDURE — 99284 EMERGENCY DEPT VISIT MOD MDM: CPT

## 2020-05-08 PROCEDURE — 96374 THER/PROPH/DIAG INJ IV PUSH: CPT

## 2020-05-08 PROCEDURE — 74011250636 HC RX REV CODE- 250/636: Performed by: INTERNAL MEDICINE

## 2020-05-08 PROCEDURE — 85025 COMPLETE CBC W/AUTO DIFF WBC: CPT

## 2020-05-08 PROCEDURE — 74011250637 HC RX REV CODE- 250/637: Performed by: INTERNAL MEDICINE

## 2020-05-08 PROCEDURE — 80307 DRUG TEST PRSMV CHEM ANLYZR: CPT

## 2020-05-08 PROCEDURE — 74011000258 HC RX REV CODE- 258: Performed by: INTERNAL MEDICINE

## 2020-05-08 PROCEDURE — 74011250636 HC RX REV CODE- 250/636: Performed by: EMERGENCY MEDICINE

## 2020-05-08 PROCEDURE — 96360 HYDRATION IV INFUSION INIT: CPT

## 2020-05-08 PROCEDURE — 99218 HC RM OBSERVATION: CPT

## 2020-05-08 PROCEDURE — 96361 HYDRATE IV INFUSION ADD-ON: CPT

## 2020-05-08 PROCEDURE — 80053 COMPREHEN METABOLIC PANEL: CPT

## 2020-05-08 PROCEDURE — 74177 CT ABD & PELVIS W/CONTRAST: CPT

## 2020-05-08 PROCEDURE — 81003 URINALYSIS AUTO W/O SCOPE: CPT

## 2020-05-08 PROCEDURE — 74011636320 HC RX REV CODE- 636/320: Performed by: INTERNAL MEDICINE

## 2020-05-08 PROCEDURE — 83690 ASSAY OF LIPASE: CPT

## 2020-05-08 RX ORDER — LANOLIN ALCOHOL/MO/W.PET/CERES
100 CREAM (GRAM) TOPICAL DAILY
Qty: 15 TAB | Refills: 0 | Status: SHIPPED | OUTPATIENT
Start: 2020-05-09 | End: 2020-05-24

## 2020-05-08 RX ORDER — SODIUM CHLORIDE 0.9 % (FLUSH) 0.9 %
5-40 SYRINGE (ML) INJECTION EVERY 8 HOURS
Status: DISCONTINUED | OUTPATIENT
Start: 2020-05-08 | End: 2020-05-12 | Stop reason: HOSPADM

## 2020-05-08 RX ORDER — NALOXONE HYDROCHLORIDE 0.4 MG/ML
0.4 INJECTION, SOLUTION INTRAMUSCULAR; INTRAVENOUS; SUBCUTANEOUS AS NEEDED
Status: DISCONTINUED | OUTPATIENT
Start: 2020-05-08 | End: 2020-05-12 | Stop reason: HOSPADM

## 2020-05-08 RX ORDER — DESVENLAFAXINE SUCCINATE 50 MG/1
50 TABLET, EXTENDED RELEASE ORAL DAILY
Status: DISCONTINUED | OUTPATIENT
Start: 2020-05-09 | End: 2020-05-12 | Stop reason: HOSPADM

## 2020-05-08 RX ORDER — ONDANSETRON 2 MG/ML
4 INJECTION INTRAMUSCULAR; INTRAVENOUS
Status: DISCONTINUED | OUTPATIENT
Start: 2020-05-08 | End: 2020-05-12 | Stop reason: HOSPADM

## 2020-05-08 RX ORDER — LEVOTHYROXINE SODIUM 112 UG/1
112 TABLET ORAL DAILY
Status: DISCONTINUED | OUTPATIENT
Start: 2020-05-09 | End: 2020-05-12 | Stop reason: HOSPADM

## 2020-05-08 RX ORDER — FOLIC ACID 1 MG/1
1 TABLET ORAL DAILY
Status: DISCONTINUED | OUTPATIENT
Start: 2020-05-09 | End: 2020-05-12 | Stop reason: HOSPADM

## 2020-05-08 RX ORDER — SODIUM CHLORIDE 0.9 % (FLUSH) 0.9 %
5-40 SYRINGE (ML) INJECTION AS NEEDED
Status: DISCONTINUED | OUTPATIENT
Start: 2020-05-08 | End: 2020-05-12 | Stop reason: HOSPADM

## 2020-05-08 RX ORDER — IBUPROFEN 200 MG
1 TABLET ORAL EVERY 24 HOURS
Qty: 30 PATCH | Refills: 0 | Status: SHIPPED | OUTPATIENT
Start: 2020-05-08 | End: 2020-06-07

## 2020-05-08 RX ORDER — SODIUM CHLORIDE 0.9 % (FLUSH) 0.9 %
10 SYRINGE (ML) INJECTION
Status: COMPLETED | OUTPATIENT
Start: 2020-05-08 | End: 2020-05-08

## 2020-05-08 RX ORDER — POLYETHYLENE GLYCOL 3350 17 G/17G
17 POWDER, FOR SOLUTION ORAL DAILY
Status: DISCONTINUED | OUTPATIENT
Start: 2020-05-09 | End: 2020-05-12 | Stop reason: HOSPADM

## 2020-05-08 RX ORDER — IBUPROFEN 200 MG
1 TABLET ORAL EVERY 24 HOURS
Status: DISCONTINUED | OUTPATIENT
Start: 2020-05-08 | End: 2020-05-12 | Stop reason: HOSPADM

## 2020-05-08 RX ORDER — CHLORDIAZEPOXIDE HYDROCHLORIDE 5 MG/1
5 CAPSULE, GELATIN COATED ORAL 3 TIMES DAILY
Qty: 15 CAP | Refills: 0 | Status: SHIPPED | OUTPATIENT
Start: 2020-05-08 | End: 2020-05-13

## 2020-05-08 RX ORDER — MORPHINE SULFATE 2 MG/ML
2 INJECTION, SOLUTION INTRAMUSCULAR; INTRAVENOUS
Status: DISCONTINUED | OUTPATIENT
Start: 2020-05-08 | End: 2020-05-09

## 2020-05-08 RX ORDER — SODIUM CHLORIDE, SODIUM LACTATE, POTASSIUM CHLORIDE, CALCIUM CHLORIDE 600; 310; 30; 20 MG/100ML; MG/100ML; MG/100ML; MG/100ML
75 INJECTION, SOLUTION INTRAVENOUS CONTINUOUS
Status: DISCONTINUED | OUTPATIENT
Start: 2020-05-08 | End: 2020-05-12 | Stop reason: HOSPADM

## 2020-05-08 RX ORDER — LANOLIN ALCOHOL/MO/W.PET/CERES
100 CREAM (GRAM) TOPICAL DAILY
Status: DISCONTINUED | OUTPATIENT
Start: 2020-05-09 | End: 2020-05-12 | Stop reason: HOSPADM

## 2020-05-08 RX ORDER — PANTOPRAZOLE SODIUM 40 MG/1
40 TABLET, DELAYED RELEASE ORAL
Status: DISCONTINUED | OUTPATIENT
Start: 2020-05-09 | End: 2020-05-12 | Stop reason: HOSPADM

## 2020-05-08 RX ORDER — FOLIC ACID 1 MG/1
1 TABLET ORAL DAILY
Qty: 15 TAB | Refills: 0 | Status: SHIPPED | OUTPATIENT
Start: 2020-05-09 | End: 2020-05-24

## 2020-05-08 RX ORDER — HYDROCODONE BITARTRATE AND ACETAMINOPHEN 5; 325 MG/1; MG/1
1 TABLET ORAL
Status: DISCONTINUED | OUTPATIENT
Start: 2020-05-08 | End: 2020-05-12

## 2020-05-08 RX ORDER — LORAZEPAM 2 MG/ML
1 INJECTION INTRAMUSCULAR
Status: DISCONTINUED | OUTPATIENT
Start: 2020-05-08 | End: 2020-05-12 | Stop reason: HOSPADM

## 2020-05-08 RX ORDER — LEVOFLOXACIN 500 MG/1
500 TABLET, FILM COATED ORAL EVERY 24 HOURS
Qty: 3 TAB | Refills: 0 | Status: SHIPPED | OUTPATIENT
Start: 2020-05-08 | End: 2020-05-11

## 2020-05-08 RX ORDER — ACETAMINOPHEN 325 MG/1
650 TABLET ORAL
Status: DISCONTINUED | OUTPATIENT
Start: 2020-05-08 | End: 2020-05-12 | Stop reason: HOSPADM

## 2020-05-08 RX ADMIN — LORAZEPAM 2 MG: 2 INJECTION INTRAMUSCULAR; INTRAVENOUS at 01:57

## 2020-05-08 RX ADMIN — Medication 10 ML: at 22:08

## 2020-05-08 RX ADMIN — Medication 100 MG: at 08:37

## 2020-05-08 RX ADMIN — LORAZEPAM 2 MG: 2 INJECTION INTRAMUSCULAR; INTRAVENOUS at 09:37

## 2020-05-08 RX ADMIN — SODIUM CHLORIDE 1000 ML: 900 INJECTION, SOLUTION INTRAVENOUS at 18:54

## 2020-05-08 RX ADMIN — POLYETHYLENE GLYCOL 3350 17 G: 17 POWDER, FOR SOLUTION ORAL at 08:37

## 2020-05-08 RX ADMIN — FOLIC ACID 1 MG: 1 TABLET ORAL at 08:37

## 2020-05-08 RX ADMIN — LEVOTHYROXINE SODIUM 112 MCG: 0.11 TABLET ORAL at 08:45

## 2020-05-08 RX ADMIN — CHLORDIAZEPOXIDE HYDROCHLORIDE 10 MG: 5 CAPSULE ORAL at 08:37

## 2020-05-08 RX ADMIN — DIATRIZOATE MEGLUMINE AND DIATRIZOATE SODIUM 15 ML: 660; 100 LIQUID ORAL; RECTAL at 20:51

## 2020-05-08 RX ADMIN — LORAZEPAM 1 MG: 2 INJECTION INTRAMUSCULAR; INTRAVENOUS at 23:30

## 2020-05-08 RX ADMIN — ONDANSETRON 4 MG: 2 INJECTION INTRAMUSCULAR; INTRAVENOUS at 01:33

## 2020-05-08 RX ADMIN — ONDANSETRON 4 MG: 2 INJECTION INTRAMUSCULAR; INTRAVENOUS at 08:36

## 2020-05-08 RX ADMIN — SODIUM CHLORIDE, SODIUM LACTATE, POTASSIUM CHLORIDE, AND CALCIUM CHLORIDE 125 ML/HR: 600; 310; 30; 20 INJECTION, SOLUTION INTRAVENOUS at 23:30

## 2020-05-08 RX ADMIN — POTASSIUM CHLORIDE 20 MEQ: 20 TABLET, EXTENDED RELEASE ORAL at 08:37

## 2020-05-08 RX ADMIN — IOPAMIDOL 100 ML: 755 INJECTION, SOLUTION INTRAVENOUS at 22:08

## 2020-05-08 RX ADMIN — SENNOSIDES AND DOCUSATE SODIUM 2 TABLET: 8.6; 5 TABLET ORAL at 08:37

## 2020-05-08 RX ADMIN — SODIUM CHLORIDE 100 ML: 900 INJECTION, SOLUTION INTRAVENOUS at 22:08

## 2020-05-08 RX ADMIN — Medication 10 ML: at 23:30

## 2020-05-08 NOTE — ED PROVIDER NOTES
42-year-old female with long history of alcohol abuse recently admitted to the hospital for withdrawal and pancreatitis. She signed out AMA this morning. Since leaving she states she has had over 1/5 of vodka and now wants \"help\". Patient is not very willing to assist with history. The history is provided by the patient. Abdominal Pain           Past Medical History:   Diagnosis Date    Gastrointestinal disorder     pancreatitis , bowel obstruction    Hypertension     Ill-defined condition     alcholism     Psychiatric disorder     depression       History reviewed. No pertinent surgical history. History reviewed. No pertinent family history.     Social History     Socioeconomic History    Marital status:      Spouse name: Not on file    Number of children: Not on file    Years of education: Not on file    Highest education level: Not on file   Occupational History    Not on file   Social Needs    Financial resource strain: Not on file    Food insecurity     Worry: Not on file     Inability: Not on file    Transportation needs     Medical: Not on file     Non-medical: Not on file   Tobacco Use    Smoking status: Not on file   Substance and Sexual Activity    Alcohol use: Yes     Comment: daily    Drug use: Yes     Types: Cocaine    Sexual activity: Not on file   Lifestyle    Physical activity     Days per week: Not on file     Minutes per session: Not on file    Stress: Not on file   Relationships    Social connections     Talks on phone: Not on file     Gets together: Not on file     Attends Druze service: Not on file     Active member of club or organization: Not on file     Attends meetings of clubs or organizations: Not on file     Relationship status: Not on file    Intimate partner violence     Fear of current or ex partner: Not on file     Emotionally abused: Not on file     Physically abused: Not on file     Forced sexual activity: Not on file   Other Topics Concern    Not on file   Social History Narrative    Not on file         ALLERGIES: Patient has no known allergies. Review of Systems   Unable to perform ROS: Other (Uncooperative)   Gastrointestinal: Positive for abdominal pain. Vitals:    05/08/20 1825   BP: 113/63   Pulse: 99   Resp: 16   Temp: 98.3 °F (36.8 °C)   SpO2: 97%   Weight: 65.3 kg (144 lb)   Height: 5' 2\" (1.575 m)            Physical Exam  Vitals signs and nursing note reviewed. Constitutional:       General: She is not in acute distress. Appearance: Normal appearance. She is well-developed. She is not toxic-appearing. HENT:      Head: Normocephalic and atraumatic. Nose: Nose normal.      Mouth/Throat:      Mouth: Mucous membranes are moist.      Pharynx: Oropharynx is clear. Eyes:      General: No scleral icterus. Conjunctiva/sclera: Conjunctivae normal.      Pupils: Pupils are equal, round, and reactive to light. Neck:      Musculoskeletal: Normal range of motion and neck supple. Cardiovascular:      Rate and Rhythm: Normal rate and regular rhythm. Heart sounds: No murmur. Pulmonary:      Effort: Pulmonary effort is normal.      Breath sounds: Normal breath sounds. Abdominal:      General: There is no distension. Palpations: Abdomen is soft. Tenderness: There is no abdominal tenderness. There is no guarding. Musculoskeletal: Normal range of motion. Skin:     General: Skin is warm and dry. Neurological:      Mental Status: She is alert and oriented to person, place, and time. Comments: Moves all extremities   Psychiatric:         Mood and Affect: Mood normal.         Behavior: Behavior normal.          MDM  Number of Diagnoses or Management Options  Alcohol-induced chronic pancreatitis [facility]):   Diagnosis management comments: Will reevaluate lab work and hydrate. Lab work shows mild rise in lipase compared to previous admission. Alcohol level 109.   Discussed with hospitalist for possible readmission.        Amount and/or Complexity of Data Reviewed  Clinical lab tests: ordered and reviewed  Discuss the patient with other providers: yes    Risk of Complications, Morbidity, and/or Mortality  Presenting problems: moderate  Diagnostic procedures: moderate  Management options: moderate           Procedures

## 2020-05-08 NOTE — ED NOTES
Patient requesting \"something to get me through this withdrawal.\" patient in formed that she is intoxicated and not currently withdrawing. Patients last drink was prior to arrival when she arrived with her friend.

## 2020-05-08 NOTE — PROGRESS NOTES
Hourly rounds performed. All needs met. Bed is in low position and call light is within. Will continue to monitor and report to oncoming nurse. Pt was very anxious during shift at one point that she stated \"I am ready to leave and took her IV out. \" Pt stated the Ativan dosage is not relieving her anxiety. Ativan given x3 per MAR. Pt also complained of nausea and Zofran was given per MAR. Will continue to monitor and report to oncoming nurse.

## 2020-05-08 NOTE — PROGRESS NOTES
CM met with patient to discuss discharge needs. Patient voiced no needs at this time. CM asked the patient, if there are any resources CM can provide at this time. Patient confirmed no additional information is needed. CM notified MD of concern, regarding the patient having suicidal thoughts yesterday evening on 5/7/2020 per NISA Bustillo note. Per MD note this day, patient denies suicidal thoughts. CM consulted with MD, to confirm discharge. CM was receptive to this information. CM remains available if any needs shall arise. Care Management Interventions  PCP Verified by CM: Yes(Dr. Nam Mancia with [facility]. )  Mode of Transport at Discharge: Self  Transition of Care Consult (CM Consult): Discharge Planning  Discharge Durable Medical Equipment: No  Physical Therapy Consult: No  Occupational Therapy Consult: No  Speech Therapy Consult: No  Current Support Network: Other(The patient is currently homeless. )  Confirm Follow Up Transport: Self  The Plan for Transition of Care is Related to the Following Treatment Goals : Patient to obtain care to become medically stable and the patient to return to her friend's home, with a safe transition.    The Patient and/or Patient Representative was Provided with a Choice of Provider and Agrees with the Discharge Plan?: Yes  Name of the Patient Representative Who was Provided with a Choice of Provider and Agrees with the Discharge Plan: Patient   Freedom of Choice List was Provided with Basic Dialogue that Supports the Patient's Individualized Plan of Care/Goals, Treatment Preferences and Shares the Quality Data Associated with the Providers?: Yes  [place] Resource Information Provided?: No  Discharge Location  Discharge Placement: Other:(Patient's Friend Home. )

## 2020-05-08 NOTE — DISCHARGE INSTRUCTIONS
Patient Education        Learning About Alcohol Withdrawal  What is alcohol withdrawal?    If you drink alcohol regularly (more than a few drinks on most days) and then suddenly stop or cut down, you may go through some physical and emotional problems while the alcohol clears out of your system. This is called withdrawal. Clearing the alcohol from your body is called detoxification, or detox. What are the symptoms? Symptoms of alcohol withdrawal may start as soon as 4 to 12 hours after you stop drinking. Or they may not start until several days after the last drink. Mild symptoms include:  · Nausea. · Sweating. · Shakiness. · Diarrhea. · Intense worry. · Disturbed sleep. · Headache. More severe symptoms include:  · Vomiting or belly pain. · Being confused, upset, and irritable. · Changed sensations. You might feel things on your body that aren't really there. Or you may see or hear things that aren't there. · Trembling. · Being short of breath or having pain in your chest.  · Having seizures. Symptoms may peak within a few days. Mild symptoms can last for a few weeks. If your symptoms are severe, you'll need to see a doctor. What is the treatment for alcohol withdrawal?  Most people may be able to cut down or stop drinking with only mild withdrawal. They can stay safe by simply resting, drinking lots of fluids, and eating healthy foods. But people who drink large amounts of alcohol or are at risk for severe withdrawal symptoms should not try to detox at home unless they work closely with a doctor to manage it. A person can die of severe alcohol withdrawal.  Before you stop drinking, talk to your doctor about how you plan to stop. Be completely honest about how much you've been drinking. Your doctor will figure out if you need to detox in a medical center. You may get medicine to treat the symptoms whether you are at home or in a medical center. Medicine that treats seizures can also help.  Your doctor will explain what types of medicine might help you. You may start with a high dose and then take smaller amounts over several days. There's also medicine that can help you avoid alcohol while you recover. How can you manage your withdrawal and recovery? Here are a few tips that can help you to not start drinking again. · Make sure there's no alcohol in the house. This includes drinks as well as liquid medicines, rubbing alcohol, and certain flavorings like vanilla extract. · Try not to hang out with people you used to drink with. · Don't go it alone. Spend time with people who support the changes you are making in your life. This includes asking for advice and help from people who have stopped drinking. You might also try mutual support groups such as Alcoholics Anonymous. · Drink lots of fluids. · Eat snacks such as fruit, cheese and crackers, and pretzels. High-carbohydrate foods may help reduce the craving for alcohol. What happens after withdrawal?  It can be hard to stop drinking. But after you clear the alcohol from your system, you can start the next, healthier part of your life. After detox, you will focus on staying alcohol-free. You can learn skills that you can use to stay abstinent (or sober) as you recover. Finding new ways to deal with life's challenges, without drinking, takes time and effort. Recovery is a long-term process. It's not something you can achieve in a few weeks. Most people get some type of therapy, such as group counseling. You also may need medicine to help you stay sober. Treatment doesn't focus on alcohol use alone. It may address other parts of your life, like your relationships, work, medical problems, and home life. Treatment, support, patience, and commitment will help you make the changes you need to live a garg life without alcohol.  You may find, over time, that the process gets easier, life becomes more joyous, and your connections to others becomes more rewarding. Where can you find help? Behavioral Health Treatment Services . This service from the [facility] and [facility]  can help you find local alcohol treatment services. Search online at Incluyeme.com. Inventorumhsa.gov or call 1-213-720-YWDQ (055 033 493), or TDD 6-671.155.9575. Where can you learn more? Go to http://linette-shaka.info/  Enter A011 in the search box to learn more about \"Learning About Alcohol Withdrawal.\"  Current as of: August 21, 2019Content Version: 12.4  © 9459-5915 Health[employer], [employer]. Care instructions adapted under license by [employer] (which disclaims liability or warranty for this information). If you have questions about a medical condition or this instruction, always ask your healthcare professional. [address] any warranty or liability for your use of this information. DISCHARGE SUMMARY from Nurse    PATIENT INSTRUCTIONS:    After general anesthesia or intravenous sedation, for 24 hours or while taking prescription Narcotics:  · Limit your activities  · Do not drive and operate hazardous machinery  · Do not make important personal or business decisions  · Do  not drink alcoholic beverages  · If you have not urinated within 8 hours after discharge, please contact your surgeon on call.     Report the following to your surgeon:  · Excessive pain, swelling, redness or odor of or around the surgical area  · Temperature over 100.5  · Nausea and vomiting lasting longer than 4 hours or if unable to take medications  · Any signs of decreased circulation or nerve impairment to extremity: change in color, persistent  numbness, tingling, coldness or increase pain  · Any questions    What to do at Home:  Recommended activity: Activity as tolerated,     If you experience any of the following symptoms trimmers, nausea, vomiting, suicide thoughts, withdrawal signs, please follow up with PCP or Emergency Dept. .    *  Please give a list of your current medications to your Primary Care Provider. *  Please update this list whenever your medications are discontinued, doses are      changed, or new medications (including over-the-counter products) are added. *  Please carry medication information at all times in case of emergency situations. These are general instructions for a healthy lifestyle:    No smoking/ No tobacco products/ Avoid exposure to second hand smoke  Surgeon General's Warning:  Quitting smoking now greatly reduces serious risk to your health. Obesity, smoking, and sedentary lifestyle greatly increases your risk for illness    A healthy diet, regular physical exercise & weight monitoring are important for maintaining a healthy lifestyle    You may be retaining fluid if you have a history of heart failure or if you experience any of the following symptoms:  Weight gain of 3 pounds or more overnight or 5 pounds in a week, increased swelling in our hands or feet or shortness of breath while lying flat in bed. Please call your doctor as soon as you notice any of these symptoms; do not wait until your next office visit. The discharge information has been reviewed with the patient. The patient verbalized understanding. Discharge medications reviewed with the patient and appropriate educational materials and side effects teaching were provided.   ___________________________________________________________________________________________________________________________________

## 2020-05-08 NOTE — ED TRIAGE NOTES
Pt reports upper abd pain, was d/c from [facility] today. Admitted for several days of ETOH pancreatitis. States that she drank vodka after leaving. Pt masked in triage.

## 2020-05-08 NOTE — DISCHARGE SUMMARY
Discharge Summary     Patient: Any Morrison MRN: [id number]  SSN: [id number]    YOB: 1970  Age: 52 y.o. Sex: female       Admit Date: 5/5/2020    Discharge Date: 5/8/2020      Admission Diagnoses: Alcohol withdrawal ([facility].) [F10.239]    Discharge Diagnoses:   Problem List as of 5/8/2020 Never Reviewed          Codes Class Noted - Resolved    Suicidal ideation ICD-10-CM: R45.851  ICD-9-CM: V62.84  5/6/2020 - Present        Acquired hypothyroidism ICD-10-CM: E03.9  ICD-9-CM: 244.9  5/6/2020 - Present        * (Principal) Alcohol withdrawal ([facility] ICD-10-CM: F10.239  ICD-9-CM: 291.81  5/5/2020 - Present               Discharge Condition: Stable    Hospital Course:     Patient with history of alcoholism, hypothyroidism, history of alcoholic pancreatitis.      Admitted due to alcohol withdrawal and also stated that she had suidical thought and tried to drink herself to death.      Patient has been on one to one watch. Able to eat better. She is feeling better today. Alert and talking well. She denies suicidal thoughts. She would like to go home. She has a friend who lives with and helps her. She promised not to drink alcohol or smoking. She states that she has a doctor to see. Physical Exam:   General:                    The patient is a middle aged female in no acute distress. She is talking and answering well. SFDF:                                   [id number]/WFPGDLAQLL. Eyes:                                   No palpebral pallor or scleral icterus. ENT:                                    External auricular and nasal exam within normal limits.                                             TJCGLJ membranes are moist.  Neck:                                   Supple, non-tender, no JVD. Lungs:                       Clear to auscultation bilaterally without wheezes or crackles.                                             No respiratory distress or accessory muscle use.   Heart:                                  Regular rate and rhythm, without murmurs, rubs, or gallops. Abdomen:                  Soft, non-tender, non-distended with normoactive bowel sounds. Genitourinary:           No tenderness over the bladder or bilateral CVAs. Extremities:               Without clubbing, cyanosis, or edema. Skin:                                    Normal color, texture, and turgor. No rashes, lesions, or jaundice. Pulses:                      Radial and dorsalis pedis pulses present 2+ bilaterally.                                               Capillary refill <2s. Neurologic:                CN II-XII grossly intact and symmetrical.                                               Moving all four extremities well with no focal deficits. Psychiatric:                Pleasant demeanor, appropriate affect. Alert and oriented x 3    Consults: None    Significant Diagnostic Studies:     XR abdomen   5-5-2020  IMPRESSION:     Unremarkable supine view the abdomen.     Recent Results (from the past 24 hour(s))   URINALYSIS W/ RFLX MICROSCOPIC     Collection Time: 05/06/20  2:50 PM   Result Value Ref Range     Color YELLOW       Appearance CLOUDY       Specific gravity 1.015 1.001 - 1.023       pH (UA) 6.5 5.0 - 9.0       Protein Negative NEG mg/dL     Glucose Negative mg/dL     Ketone Negative NEG mg/dL     Bilirubin Negative NEG       Blood Negative NEG       Urobilinogen 0.2 0.2 - 1.0 EU/dL     Nitrites Negative NEG       Leukocyte Esterase Negative NEG     METABOLIC PANEL, BASIC     Collection Time: 05/07/20  6:09 AM   Result Value Ref Range     Sodium 140 136 - 145 mmol/L     Potassium 3.5 3.5 - 5.1 mmol/L     Chloride 104 98 - 107 mmol/L     CO2 29 21 - 32 mmol/L     Anion gap 7 7 - 16 mmol/L     Glucose 87 65 - 100 mg/dL     BUN 8 6 - 23 MG/DL     Creatinine 0.55 (L) 0.6 - 1.0 MG/DL     GFR est AA >60 >60 ml/min/1.73m2     GFR est non-AA >60 >60 ml/min/1.73m2     Calcium 9.1 8.3 - 10.4 MG/DL MAGNESIUM     Collection Time: 05/07/20  6:09 AM   Result Value Ref Range     Magnesium 2.0 1.8 - 2.4 mg/dL            Disposition: home    Discharge Medications:   Current Discharge Medication List      START taking these medications    Details   chlordiazePOXIDE (LIBRIUM) 5 mg capsule Take 1 Cap by mouth three (3) times daily for 5 days. Max Daily Amount: 15 mg. Medication may cause drowsiness. Avoid driving or engaging in actitivities that require full alertness. Qty: 15 Cap, Refills: 0    Associated Diagnoses: Alcohol withdrawal syndrome without complication (HCC)      folic acid (FOLVITE) 1 mg tablet Take 1 Tab by mouth daily for 15 days. Qty: 15 Tab, Refills: 0      levoFLOXacin (LEVAQUIN) 500 mg tablet Take 1 Tab by mouth every twenty-four (24) hours for 3 days. Qty: 3 Tab, Refills: 0      nicotine (NICODERM CQ) 14 mg/24 hr patch 1 Patch by TransDERmal route every twenty-four (24) hours for 30 days. Qty: 30 Patch, Refills: 0      thiamine HCL (B-1) 100 mg tablet Take 1 Tab by mouth daily for 15 days. Qty: 15 Tab, Refills: 0         CONTINUE these medications which have NOT CHANGED    Details   levothyroxine (SYNTHROID) 112 mcg tablet Take 112 mcg by mouth daily. desvenlafaxine succinate (Pristiq) 50 mg ER tablet Take 50 mg by mouth. Activity: Activity as tolerated  Diet: Regular Diet  Wound Care: Keep wound clean and dry    Follow-up Appointments   Procedures    FOLLOW UP VISIT Appointment in: 3 - 5 Days See your primary doctor. See your primary doctor. Standing Status:   Standing     Number of Occurrences:   1     Order Specific Question:   Appointment in     Answer:   3 - 5 Days     I have discussed the plan of care with patient. Time spent on discharge is 37 minutes.       Signed By: Riky Estrella MD     May 8, 2020

## 2020-05-09 LAB
ALBUMIN SERPL-MCNC: 3 G/DL (ref 3.5–5)
ALBUMIN/GLOB SERPL: 1.3 {RATIO} (ref 1.2–3.5)
ALP SERPL-CCNC: 52 U/L (ref 50–136)
ALT SERPL-CCNC: 69 U/L (ref 12–65)
ANION GAP SERPL CALC-SCNC: 6 MMOL/L (ref 7–16)
AST SERPL-CCNC: 93 U/L (ref 15–37)
BILIRUB SERPL-MCNC: 0.2 MG/DL (ref 0.2–1.1)
BUN SERPL-MCNC: 5 MG/DL (ref 6–23)
CALCIUM SERPL-MCNC: 8.9 MG/DL (ref 8.3–10.4)
CHLORIDE SERPL-SCNC: 109 MMOL/L (ref 98–107)
CO2 SERPL-SCNC: 27 MMOL/L (ref 21–32)
CREAT SERPL-MCNC: 0.56 MG/DL (ref 0.6–1)
GLOBULIN SER CALC-MCNC: 2.3 G/DL (ref 2.3–3.5)
GLUCOSE SERPL-MCNC: 83 MG/DL (ref 65–100)
MAGNESIUM SERPL-MCNC: 2 MG/DL (ref 1.8–2.4)
POTASSIUM SERPL-SCNC: 3.7 MMOL/L (ref 3.5–5.1)
PROT SERPL-MCNC: 5.3 G/DL (ref 6.3–8.2)
SODIUM SERPL-SCNC: 142 MMOL/L (ref 136–145)

## 2020-05-09 PROCEDURE — 74011250637 HC RX REV CODE- 250/637: Performed by: INTERNAL MEDICINE

## 2020-05-09 PROCEDURE — 99218 HC RM OBSERVATION: CPT

## 2020-05-09 PROCEDURE — 96376 TX/PRO/DX INJ SAME DRUG ADON: CPT

## 2020-05-09 PROCEDURE — 80053 COMPREHEN METABOLIC PANEL: CPT

## 2020-05-09 PROCEDURE — 83735 ASSAY OF MAGNESIUM: CPT

## 2020-05-09 PROCEDURE — 74011250636 HC RX REV CODE- 250/636: Performed by: INTERNAL MEDICINE

## 2020-05-09 PROCEDURE — 36415 COLL VENOUS BLD VENIPUNCTURE: CPT

## 2020-05-09 RX ORDER — MORPHINE SULFATE 2 MG/ML
2 INJECTION, SOLUTION INTRAMUSCULAR; INTRAVENOUS
Status: DISCONTINUED | OUTPATIENT
Start: 2020-05-09 | End: 2020-05-12

## 2020-05-09 RX ORDER — CHLORDIAZEPOXIDE HYDROCHLORIDE 5 MG/1
10 CAPSULE, GELATIN COATED ORAL 4 TIMES DAILY
Status: DISCONTINUED | OUTPATIENT
Start: 2020-05-09 | End: 2020-05-11

## 2020-05-09 RX ADMIN — LORAZEPAM 1 MG: 2 INJECTION INTRAMUSCULAR; INTRAVENOUS at 05:24

## 2020-05-09 RX ADMIN — PANTOPRAZOLE SODIUM 40 MG: 40 TABLET, DELAYED RELEASE ORAL at 05:23

## 2020-05-09 RX ADMIN — CHLORDIAZEPOXIDE HYDROCHLORIDE 10 MG: 5 CAPSULE ORAL at 21:26

## 2020-05-09 RX ADMIN — Medication 100 MG: at 08:09

## 2020-05-09 RX ADMIN — CHLORDIAZEPOXIDE HYDROCHLORIDE 10 MG: 5 CAPSULE ORAL at 08:09

## 2020-05-09 RX ADMIN — CHLORDIAZEPOXIDE HYDROCHLORIDE 10 MG: 5 CAPSULE ORAL at 17:29

## 2020-05-09 RX ADMIN — Medication 10 ML: at 21:26

## 2020-05-09 RX ADMIN — CHLORDIAZEPOXIDE HYDROCHLORIDE 10 MG: 5 CAPSULE ORAL at 12:05

## 2020-05-09 RX ADMIN — Medication 10 ML: at 14:06

## 2020-05-09 RX ADMIN — LORAZEPAM 1 MG: 2 INJECTION INTRAMUSCULAR; INTRAVENOUS at 17:54

## 2020-05-09 RX ADMIN — FOLIC ACID 1 MG: 1 TABLET ORAL at 08:09

## 2020-05-09 RX ADMIN — SODIUM CHLORIDE, SODIUM LACTATE, POTASSIUM CHLORIDE, AND CALCIUM CHLORIDE 100 ML/HR: 600; 310; 30; 20 INJECTION, SOLUTION INTRAVENOUS at 18:09

## 2020-05-09 RX ADMIN — LEVOTHYROXINE SODIUM 112 MCG: 0.11 TABLET ORAL at 05:23

## 2020-05-09 RX ADMIN — LORAZEPAM 1 MG: 2 INJECTION INTRAMUSCULAR; INTRAVENOUS at 22:41

## 2020-05-09 RX ADMIN — Medication 10 ML: at 05:24

## 2020-05-09 RX ADMIN — LORAZEPAM 1 MG: 2 INJECTION INTRAMUSCULAR; INTRAVENOUS at 09:27

## 2020-05-09 NOTE — ED NOTES
TRANSFER - OUT REPORT:    Verbal report given to Angelica Sharp RN (name) on Faizan Waller  being transferred to [facility] (unit) for routine progression of care       Report consisted of patients Situation, Background, Assessment and   Recommendations(SBAR). Information from the following report(s) SBAR, Kardex, ED Summary, [facility] and Recent Results was reviewed with the receiving nurse. Lines:   Peripheral IV 05/08/20 Right Hand (Active)   Site Assessment Clean, dry, & intact 5/8/2020  6:44 PM   Phlebitis Assessment 0 5/8/2020  6:44 PM   Infiltration Assessment 0 5/8/2020  6:44 PM   Dressing Status Clean, dry, & intact 5/8/2020  6:44 PM   Hub Color/Line Status Pink 5/8/2020  6:44 PM        Opportunity for questions and clarification was provided.       Patient transported with:   "[employer]"

## 2020-05-09 NOTE — PROGRESS NOTES
Brought pt back from CT and helped pt back to bed. Sitter now present with patient. Primary nurse made aware.

## 2020-05-09 NOTE — PROGRESS NOTES
05/08/20 2149   Dual Skin Pressure Injury Assessment   Dual Skin Pressure Injury Assessment WDL   Second Care Provider (Based on [address] [place] [address]) Eliane Delgado RN        05/08/20 2149   Skin Integumentary   Skin Integumentary (WDL) X   Skin Color Appropriate for ethnicity   Skin Condition/Temp Dry; Warm   Skin Integrity Scars (comment)  (multiple healed surgical scars to chest and abd)   Turgor Non-tenting   Hair Growth Present   Varicosities Absent

## 2020-05-09 NOTE — PROGRESS NOTES
Pt threatened to leave AMA upon coming to floor. Charge RN and this RN able to calm pt to wait on CT. Pt continues to ask about solid foods. Medicated for anxiety per MAR. Hourly rounds performed through shift, pt denies needs at this time. Bed in low position and call light/ personal items within reach. Will continue to monitor and report to day shift nurse.

## 2020-05-09 NOTE — PROGRESS NOTES
TRANSFER - IN REPORT:    Verbal report received from Nia RN(name) on Zulma Michel  being received from ED(unit) for routine progression of care      Report consisted of patients Situation, Background, Assessment and   Recommendations(SBAR). Information from the following report(s) SBAR, ED Summary, [facility] and Recent Results was reviewed with the receiving nurse. Opportunity for questions and clarification was provided. Assessment completed upon patients arrival to unit and care assumed.

## 2020-05-09 NOTE — H&P
Hospitalist Note     Admit Date:  2020  6:25 PM   Name:  Demario Sullivan   Age:  52 y.o.  :  1970   MRN:  [id number]   PCP:  None  Treatment Team: Attending Provider: Deric Ayoub MD; Primary Nurse: Kerry Craig RN    HPI/Subjective:   Ms. Monica Roche is a 53 y/o WF with a h/o alcohol abuse/pancreatitis, hypothyroidism and MDD recently admitted from - for suspected alcohol withdrawal. Lipase at admission was 435. She was treated with Librium, thiamine/folic acid. TSH was elevated at 70 but she had been off Synthroid for several weeks prior to this so she was resumed on 112mcg daily (her home dose). She was seen by tele-Psych and was not deemed suicidal. Her home medications were continued. She improved and was discharged home on  AM. She went home and proceeded to drink 1/5th of vodka. Her abdominal pain came back, epigastric, and worse than before. She's had some nausea and vomiting of bile. No diarrhea, chest pain, fevers, rash, SOB/ADEN, edema. Labs here unremarkable. Lipase is mildly elevated at 575, alcohol level 109. She now states that she wants to quit drinking. We are asked to admit for possible alcoholic pancreatitis. 10 systems reviewed and negative except as noted in HPI. Past Medical History:   Diagnosis Date    Gastrointestinal disorder     pancreatitis , bowel obstruction    Hypertension     Ill-defined condition     alcholism     Psychiatric disorder     depression      History reviewed. No pertinent surgical history. No Known Allergies   Social History     Tobacco Use    Smoking status: Not on file   Substance Use Topics    Alcohol use: Yes     Comment: daily      History reviewed. No pertinent family history. Family history reviewed and noncontributory. There is no immunization history on file for this patient. PTA Medications:  Prior to Admission Medications   Prescriptions Last Dose Informant Patient Reported?  Taking?   chlordiazePOXIDE (LIBRIUM) 5 mg capsule   No No   Sig: Take 1 Cap by mouth three (3) times daily for 5 days. Max Daily Amount: 15 mg. Medication may cause drowsiness. Avoid driving or engaging in actitivities that require full alertness. desvenlafaxine succinate (Pristiq) 50 mg ER tablet   Yes No   Sig: Take 50 mg by mouth. folic acid (FOLVITE) 1 mg tablet   No No   Sig: Take 1 Tab by mouth daily for 15 days. levoFLOXacin (LEVAQUIN) 500 mg tablet   No No   Sig: Take 1 Tab by mouth every twenty-four (24) hours for 3 days. levothyroxine (SYNTHROID) 112 mcg tablet   Yes No   Sig: Take 112 mcg by mouth daily. nicotine (NICODERM CQ) 14 mg/24 hr patch   No No   Si Patch by TransDERmal route every twenty-four (24) hours for 30 days. thiamine HCL (B-1) 100 mg tablet   No No   Sig: Take 1 Tab by mouth daily for 15 days. Facility-Administered Medications: None       Objective:     Patient Vitals for the past 24 hrs:   Temp Pulse Resp BP SpO2   20  71 16 115/79 99 %   20  72 17 115/81 98 %   20 1920  75 18 109/75 97 %   20 1900  89 17 106/79 97 %   20 1825 98.3 °F (36.8 °C) 99 16 113/63 97 %     Oxygen Therapy  O2 Sat (%): 99 % (20)  Pulse via Oximetry: 71 beats per minute (20)  O2 Device: Room air (20)    Estimated body mass index is 26.34 kg/m² as calculated from the following:    Height as of this encounter: 5' 2\" (1.575 m). Weight as of this encounter: 65.3 kg (144 lb). No intake or output data in the 24 hours ending 20    *Note that automatically entered I/Os may not be accurate; dependent on patient compliance with collection and accurate  by assistants. Physical Exam:  General:    Well nourished. Alert. Eyes:   Normal sclerae. Extraocular movements intact. HENT:  Normocephalic, atraumatic. Moist mucous membranes  CV:   RRR. No m/r/g. Lungs:  CTAB. No wheezing, rhonchi, or rales.   Abdomen: Soft, epigastric tenderness to palpation. No rashes, no masses or organomegaly. Extremities: Warm and dry. No cyanosis or edema. Neurologic: CN II-XII grossly intact. Sensation intact. Skin:     No rashes or jaundice. Normal coloration  Psych:  Normal mood and affect. I reviewed the labs, imaging, EKGs, telemetry, and other studies done this admission. Data Review:   Recent Results (from the past 24 hour(s))   METABOLIC PANEL, COMPREHENSIVE    Collection Time: 05/08/20  6:32 PM   Result Value Ref Range    Sodium 142 136 - 145 mmol/L    Potassium 4.8 3.5 - 5.1 mmol/L    Chloride 108 (H) 98 - 107 mmol/L    CO2 26 21 - 32 mmol/L    Anion gap 8 7 - 16 mmol/L    Glucose 94 65 - 100 mg/dL    BUN 5 (L) 6 - 23 MG/DL    Creatinine 0.58 (L) 0.6 - 1.0 MG/DL    GFR est AA >60 >60 ml/min/1.73m2    GFR est non-AA >60 >60 ml/min/1.73m2    Calcium 9.5 8.3 - 10.4 MG/DL    Bilirubin, total 0.2 0.2 - 1.1 MG/DL    ALT (SGPT) 91 (H) 12 - 65 U/L    AST (SGOT) 167 (H) 15 - 37 U/L    Alk.  phosphatase 63 50 - 136 U/L    Protein, total 6.7 6.3 - 8.2 g/dL    Albumin 3.4 (L) 3.5 - 5.0 g/dL    Globulin 3.3 2.3 - 3.5 g/dL    A-G Ratio 1.0 (L) 1.2 - 3.5     LIPASE    Collection Time: 05/08/20  6:32 PM   Result Value Ref Range    Lipase 575 (H) 73 - 393 U/L   ETHYL ALCOHOL    Collection Time: 05/08/20  6:32 PM   Result Value Ref Range    ALCOHOL(ETHYL),SERUM 109 MG/DL   CBC WITH AUTOMATED DIFF    Collection Time: 05/08/20  6:54 PM   Result Value Ref Range    WBC 4.3 4.3 - 11.1 K/uL    RBC 3.77 (L) 4.05 - 5.2 M/uL    HGB 12.4 11.7 - 15.4 g/dL    HCT 37.1 35.8 - 46.3 %    MCV 98.4 (H) 79.6 - 97.8 FL    MCH 32.9 26.1 - 32.9 PG    MCHC 33.4 31.4 - 35.0 g/dL    RDW 14.0 11.9 - 14.6 %    PLATELET 243 (L) 376 - 450 K/uL    MPV 9.8 9.4 - 12.3 FL    ABSOLUTE NRBC 0.00 0.0 - 0.2 K/uL    DF AUTOMATED      NEUTROPHILS 63 43 - 78 %    LYMPHOCYTES 32 13 - 44 %    MONOCYTES 5 4.0 - 12.0 %    EOSINOPHILS 0 (L) 0.5 - 7.8 %    BASOPHILS 0 0.0 - 2.0 %    IMMATURE GRANULOCYTES 0 0.0 - 5.0 %    ABS. NEUTROPHILS 2.7 1.7 - 8.2 K/UL    ABS. LYMPHOCYTES 1.4 0.5 - 4.6 K/UL    ABS. MONOCYTES 0.2 0.1 - 1.3 K/UL    ABS. EOSINOPHILS 0.0 0.0 - 0.8 K/UL    ABS. BASOPHILS 0.0 0.0 - 0.2 K/UL    ABS. IMM. GRANS. 0.0 0.0 - 0.5 K/UL       All Micro Results     None          Current Facility-Administered Medications   Medication Dose Route Frequency    sodium chloride 0.9 % bolus infusion 100 mL  100 mL IntraVENous RAD ONCE    saline peripheral flush soln 10 mL  10 mL InterCATHeter RAD ONCE    iopamidoL (ISOVUE-370) 76 % injection 100 mL  100 mL IntraVENous RAD ONCE     Current Outpatient Medications   Medication Sig    chlordiazePOXIDE (LIBRIUM) 5 mg capsule Take 1 Cap by mouth three (3) times daily for 5 days. Max Daily Amount: 15 mg. Medication may cause drowsiness. Avoid driving or engaging in actitivities that require full alertness.  [START ON 0/1/2266] folic acid (FOLVITE) 1 mg tablet Take 1 Tab by mouth daily for 15 days.  levoFLOXacin (LEVAQUIN) 500 mg tablet Take 1 Tab by mouth every twenty-four (24) hours for 3 days.  nicotine (NICODERM CQ) 14 mg/24 hr patch 1 Patch by TransDERmal route every twenty-four (24) hours for 30 days.  [START ON 5/9/2020] thiamine HCL (B-1) 100 mg tablet Take 1 Tab by mouth daily for 15 days.  levothyroxine (SYNTHROID) 112 mcg tablet Take 112 mcg by mouth daily.  desvenlafaxine succinate (Pristiq) 50 mg ER tablet Take 50 mg by mouth. Other Studies:  No results found for this visit on 05/08/20. No results found.       Assessment and Plan:     Hospital Problems as of 5/8/2020 Date Reviewed: 5/8/2020          Codes Class Noted - Resolved POA    * (Principal) Alcohol abuse ICD-10-CM: F10.10  ICD-9-CM: 305.00  5/8/2020 - Present Yes        Abdominal pain ICD-10-CM: R10.9  ICD-9-CM: 789.00  5/8/2020 - Present Yes        Nausea and vomiting ICD-10-CM: R11.2  ICD-9-CM: 787.01  5/8/2020 - Present Yes        MDD (major depressive disorder) ICD-10-CM: F32.9  ICD-9-CM: 296.20  5/8/2020 - Present Yes        Acute alcoholic pancreatitis RJW-86-UC: K85.20  ICD-9-CM: 567.1  5/8/2020 - Present Unknown        Acquired hypothyroidism ICD-10-CM: E03.9  ICD-9-CM: 244.9  5/6/2020 - Present Yes              Plan:  # Possible alcoholic pancreatitis/alcohol abuse   - Lipase minimally elevated. Check CT a/p given progressive symptoms. Could also represent gastritis so will start PPI. - LR at 250/hr. Trial clears per patient request. Pain control. Ativan prn w/d symptoms; thiamine/folic acid. CM referral for [facility] outpatient. CMP tomorrow AM.    # Hypothyroidism    - TSH on 5/5 was 70. Per notes she had not been taking her Synthroid so she was resumed on home dose of 112mcg daily. Needs f/u thyroid labs in about 6 weeks. # MDD   - Takes Pristiq     Discharge planning: Home. DVT ppx ordered: SCDs  Code status:  Full code. Friend Ly Mai is decision maker per H&P from 5/5 and verified with patient tonight. Estimated LOS:  Greater than 2 midnights  Risk:  high    Addendum: CT reviewed, no acute changes aside from mild constipation. Pancreas appears normal. Does not meet criteria for acute pancreatitis. Likely gastritis/EtOH related discomfort. Decrease IVFs. Con't symptomatic mgmt. Add miralax.     Signed:  Viet Encarnacion MD

## 2020-05-09 NOTE — PROGRESS NOTES
Progress Note    Patient: Gracie Colorado MRN: [id number]  SSN: [id number]    YOB: 1970  Age: 52 y.o. Sex: female      Admit Date: 5/8/2020    LOS: 0 days     Subjective:     Patient with alcoholism, depression, history of pancreatitis, hypothyroidism. Just discharged on 5/8/2020 when she was admitted at that time for alcohol withdrawal and suicidal ideation. She reported not suicidal at the time of discharge and not now. Admitted this time after drinking 1/5 bottle of vodka and had abdominal pain. She had some elevation of lipase. She would like to have solid food today. She states that her abdomen is better. No fever. Objective:     Vitals:    05/08/20 2158 05/09/20 0209 05/09/20 0542 05/09/20 0653   BP: 120/84 109/76 100/55 103/60   Pulse: 87 75 74 76   Resp: 17 16 16 18   Temp: 98.3 °F (36.8 °C) 98.1 °F (36.7 °C) 97.8 °F (36.6 °C) 98 °F (36.7 °C)   SpO2: 99% 97% 94% 96%   Weight:       Height:            Intake and Output:  Current Shift: 05/09 0701 - 05/09 1900  In: 540 [P.O.:540]  Out: -   Last three shifts: No intake/output data recorded. Physical Exam:     General:                    The patient is a middle aged female in no acute distress. Patient appears slightly lethargic. Head:                                   Normocephalic/atraumatic. Eyes:                                   No palpebral pallor or scleral icterus. ENT:                                    External auricular and nasal exam within normal limits. Mucous membranes are moist.  Neck:                                   Supple, non-tender, no JVD. Lungs:                       Clear to auscultation bilaterally without wheezes or crackles. No respiratory distress or accessory muscle use. Heart:                                  Regular rate and rhythm, without murmurs, rubs, or gallops.   Abdomen: Soft, non-tender, non-distended with normoactive bowel sounds. Genitourinary:           No tenderness over the bladder or bilateral CVAs. Extremities:               Without clubbing, cyanosis, or edema. Skin:                                    Normal color, texture, and turgor. No rashes, lesions, or jaundice. Pulses:                      Radial and dorsalis pedis pulses present 2+ bilaterally. Capillary refill <2s. Neurologic:                CN II-XII grossly intact and symmetrical.                                               Moving all four extremities well with no focal deficits. Psychiatric:                Pleasant demeanor, appropriate affect. Alert and oriented x 3      Lab/Data Review:    Recent Results (from the past 24 hour(s))   METABOLIC PANEL, COMPREHENSIVE    Collection Time: 05/08/20  6:32 PM   Result Value Ref Range    Sodium 142 136 - 145 mmol/L    Potassium 4.8 3.5 - 5.1 mmol/L    Chloride 108 (H) 98 - 107 mmol/L    CO2 26 21 - 32 mmol/L    Anion gap 8 7 - 16 mmol/L    Glucose 94 65 - 100 mg/dL    BUN 5 (L) 6 - 23 MG/DL    Creatinine 0.58 (L) 0.6 - 1.0 MG/DL    GFR est AA >60 >60 ml/min/1.73m2    GFR est non-AA >60 >60 ml/min/1.73m2    Calcium 9.5 8.3 - 10.4 MG/DL    Bilirubin, total 0.2 0.2 - 1.1 MG/DL    ALT (SGPT) 91 (H) 12 - 65 U/L    AST (SGOT) 167 (H) 15 - 37 U/L    Alk.  phosphatase 63 50 - 136 U/L    Protein, total 6.7 6.3 - 8.2 g/dL    Albumin 3.4 (L) 3.5 - 5.0 g/dL    Globulin 3.3 2.3 - 3.5 g/dL    A-G Ratio 1.0 (L) 1.2 - 3.5     LIPASE    Collection Time: 05/08/20  6:32 PM   Result Value Ref Range    Lipase 575 (H) 73 - 393 U/L   ETHYL ALCOHOL    Collection Time: 05/08/20  6:32 PM   Result Value Ref Range    ALCOHOL(ETHYL),SERUM 109 MG/DL   CBC WITH AUTOMATED DIFF    Collection Time: 05/08/20  6:54 PM   Result Value Ref Range    WBC 4.3 4.3 - 11.1 K/uL    RBC 3.77 (L) 4.05 - 5.2 M/uL    HGB 12.4 11.7 - 15.4 g/dL    HCT 37.1 35.8 - 46.3 %    MCV 98.4 (H) 79.6 - 97.8 FL    MCH 32.9 26.1 - 32.9 PG    MCHC 33.4 31.4 - 35.0 g/dL    RDW 14.0 11.9 - 14.6 %    PLATELET 984 (L) 956 - 450 K/uL    MPV 9.8 9.4 - 12.3 FL    ABSOLUTE NRBC 0.00 0.0 - 0.2 K/uL    DF AUTOMATED      NEUTROPHILS 63 43 - 78 %    LYMPHOCYTES 32 13 - 44 %    MONOCYTES 5 4.0 - 12.0 %    EOSINOPHILS 0 (L) 0.5 - 7.8 %    BASOPHILS 0 0.0 - 2.0 %    IMMATURE GRANULOCYTES 0 0.0 - 5.0 %    ABS. NEUTROPHILS 2.7 1.7 - 8.2 K/UL    ABS. LYMPHOCYTES 1.4 0.5 - 4.6 K/UL    ABS. MONOCYTES 0.2 0.1 - 1.3 K/UL    ABS. EOSINOPHILS 0.0 0.0 - 0.8 K/UL    ABS. BASOPHILS 0.0 0.0 - 0.2 K/UL    ABS. IMM. GRANS. 0.0 0.0 - 0.5 K/UL   METABOLIC PANEL, COMPREHENSIVE    Collection Time: 05/09/20  5:35 AM   Result Value Ref Range    Sodium 142 136 - 145 mmol/L    Potassium 3.7 3.5 - 5.1 mmol/L    Chloride 109 (H) 98 - 107 mmol/L    CO2 27 21 - 32 mmol/L    Anion gap 6 (L) 7 - 16 mmol/L    Glucose 83 65 - 100 mg/dL    BUN 5 (L) 6 - 23 MG/DL    Creatinine 0.56 (L) 0.6 - 1.0 MG/DL    GFR est AA >60 >60 ml/min/1.73m2    GFR est non-AA >60 >60 ml/min/1.73m2    Calcium 8.9 8.3 - 10.4 MG/DL    Bilirubin, total 0.2 0.2 - 1.1 MG/DL    ALT (SGPT) 69 (H) 12 - 65 U/L    AST (SGOT) 93 (H) 15 - 37 U/L    Alk. phosphatase 52 50 - 136 U/L    Protein, total 5.3 (L) 6.3 - 8.2 g/dL    Albumin 3.0 (L) 3.5 - 5.0 g/dL    Globulin 2.3 2.3 - 3.5 g/dL    A-G Ratio 1.3 1.2 - 3.5     MAGNESIUM    Collection Time: 05/09/20  5:35 AM   Result Value Ref Range    Magnesium 2.0 1.8 - 2.4 mg/dL     CT abdomen and pelvis   5/8/2020    IMPRESSION:   1. No acute process. 2. Fatty liver. 3. Prior cholecystectomy and gastric bypass. 4. Mild constipation.       Current Facility-Administered Medications:     chlordiazePOXIDE (LIBRIUM) capsule 10 mg, 10 mg, Oral, QID, Ellen Leos MD, 10 mg at 05/09/20 0809    desvenlafaxine succinate (PRISTIQ) ER tablet 50 mg (Patient Supplied), 50 mg, Oral, DAILY, Yossi Norwood MD    folic acid (FOLVITE) tablet 1 mg, 1 mg, Oral, DAILY, Agueda Lopez MD, 1 mg at 05/09/20 0809    levothyroxine (SYNTHROID) tablet 112 mcg, 112 mcg, Oral, DAILY, Agueda Lopez MD, 112 mcg at 05/09/20 0523    thiamine HCL (B-1) tablet 100 mg, 100 mg, Oral, DAILY, Agueda Lopez MD, 100 mg at 05/09/20 0809    nicotine (NICODERM CQ) 14 mg/24 hr patch 1 Patch, 1 Patch, TransDERmal, Q24H, Agueda Lopez MD, 1 Patch at 05/08/20 2330    sodium chloride (NS) flush 5-40 mL, 5-40 mL, IntraVENous, Q8H, Agueda Lopez MD, 10 mL at 05/09/20 0524    sodium chloride (NS) flush 5-40 mL, 5-40 mL, IntraVENous, PRN, Agueda Lopez MD    acetaminophen (TYLENOL) tablet 650 mg, 650 mg, Oral, Q4H PRN, Agueda Lopez MD    HYDROcodone-acetaminophen (NORCO) 5-325 mg per tablet 1 Tab, 1 Tab, Oral, Q4H PRN, Agueda Lopez MD    morphine injection 2 mg, 2 mg, IntraVENous, Q4H PRN, Agueda Lopez MD    [facility]) injection 0.4 mg, 0.4 mg, IntraVENous, PRN, Agueda Lopez MD    ondansetron [facility]) injection 4 mg, 4 mg, IntraVENous, Q4H PRN, Agueda Lopez MD    lactated Ringers infusion, 100 mL/hr, IntraVENous, CONTINUOUS, Ellen Leos MD, Last Rate: 100 mL/hr at 05/09/20 0809, 100 mL/hr at 05/09/20 0809    LORazepam (ATIVAN) injection 1 mg, 1 mg, IntraVENous, Q4H PRN, Agueda Lopez MD, 1 mg at 05/09/20 0524    pantoprazole (PROTONIX) tablet 40 mg, 40 mg, Oral, ACB, Agueda Lopez MD, 40 mg at 05/09/20 0523    polyethylene glycol (MIRALAX) packet 17 g, 17 g, Oral, DAILY, Agueda Lopez MD       Assessment:     Principal Problem:    Alcohol abuse (5/8/2020)    Active Problems:    Acquired hypothyroidism (5/6/2020)      Abdominal pain (5/8/2020)      Nausea and vomiting (5/8/2020)      MDD (major depressive disorder) (5/8/2020)        Plan:     Alcohol abuse. With elevation of lipase, likely from gastritis from active drinking of alcohol. Continue with IV fluid. Protonix. Pain control. Advance diet as tolerated. Folic acid, Thiamine, Librium. Hypothyroidism   Continue home medications. Alcoholism   Smoking   I advised patient to quit. Nicotine patch. I have discussed the plan of care with patient.       DVT prophylaxis : SCD       Signed By: Jazmin Soto MD     May 9, 2020

## 2020-05-10 PROCEDURE — 96376 TX/PRO/DX INJ SAME DRUG ADON: CPT

## 2020-05-10 PROCEDURE — 74011250636 HC RX REV CODE- 250/636: Performed by: INTERNAL MEDICINE

## 2020-05-10 PROCEDURE — 96375 TX/PRO/DX INJ NEW DRUG ADDON: CPT

## 2020-05-10 PROCEDURE — 74011250637 HC RX REV CODE- 250/637: Performed by: INTERNAL MEDICINE

## 2020-05-10 PROCEDURE — 99218 HC RM OBSERVATION: CPT

## 2020-05-10 RX ADMIN — ONDANSETRON 4 MG: 2 INJECTION INTRAMUSCULAR; INTRAVENOUS at 23:00

## 2020-05-10 RX ADMIN — LORAZEPAM 1 MG: 2 INJECTION INTRAMUSCULAR; INTRAVENOUS at 03:03

## 2020-05-10 RX ADMIN — CHLORDIAZEPOXIDE HYDROCHLORIDE 10 MG: 5 CAPSULE ORAL at 13:47

## 2020-05-10 RX ADMIN — LORAZEPAM 1 MG: 2 INJECTION INTRAMUSCULAR; INTRAVENOUS at 18:09

## 2020-05-10 RX ADMIN — LORAZEPAM 1 MG: 2 INJECTION INTRAMUSCULAR; INTRAVENOUS at 09:27

## 2020-05-10 RX ADMIN — CHLORDIAZEPOXIDE HYDROCHLORIDE 10 MG: 5 CAPSULE ORAL at 17:29

## 2020-05-10 RX ADMIN — CHLORDIAZEPOXIDE HYDROCHLORIDE 10 MG: 5 CAPSULE ORAL at 21:14

## 2020-05-10 RX ADMIN — Medication 10 ML: at 21:15

## 2020-05-10 RX ADMIN — LEVOTHYROXINE SODIUM 112 MCG: 0.11 TABLET ORAL at 05:18

## 2020-05-10 RX ADMIN — CHLORDIAZEPOXIDE HYDROCHLORIDE 10 MG: 5 CAPSULE ORAL at 09:18

## 2020-05-10 RX ADMIN — PANTOPRAZOLE SODIUM 40 MG: 40 TABLET, DELAYED RELEASE ORAL at 05:18

## 2020-05-10 RX ADMIN — SODIUM CHLORIDE, SODIUM LACTATE, POTASSIUM CHLORIDE, AND CALCIUM CHLORIDE 100 ML/HR: 600; 310; 30; 20 INJECTION, SOLUTION INTRAVENOUS at 13:46

## 2020-05-10 RX ADMIN — FOLIC ACID 1 MG: 1 TABLET ORAL at 09:18

## 2020-05-10 RX ADMIN — LORAZEPAM 1 MG: 2 INJECTION INTRAMUSCULAR; INTRAVENOUS at 22:15

## 2020-05-10 RX ADMIN — Medication 10 ML: at 13:50

## 2020-05-10 RX ADMIN — Medication 10 ML: at 05:18

## 2020-05-10 RX ADMIN — Medication 100 MG: at 09:18

## 2020-05-10 RX ADMIN — LORAZEPAM 1 MG: 2 INJECTION INTRAMUSCULAR; INTRAVENOUS at 13:47

## 2020-05-10 NOTE — PROGRESS NOTES
CM met with patient to discuss d/c plan. Patient left AMA on 5/8/20 per Dr. Nicolasa Camarena. Patient return back later in the ER on the same day after drinking 1/5 of vodka with alcohol level of 109. Patient was decline information on [facility] and states that she wasn't interested that she wanted to be d/c home. CM made patient aware that she need to speak with MD concerning d/c. Patient became tearful and states she need to go home and be with her boyfriend. States that boyfriend doesn't drink but won't allow her to stay with him. CM  Will continue to monitor. Care Management Interventions  PCP Verified by CM: Yes(Dr. Luz Maria Kan at [facility])  Mode of Transport at Discharge: Other (see comment)  Transition of Care Consult (CM Consult): Discharge Planning  Discharge Durable Medical Equipment: No  Physical Therapy Consult: No  Occupational Therapy Consult: No  Speech Therapy Consult: No  Current Support Network:  Other  Freedom of Choice List was Provided with Basic Dialogue that Supports the Patient's Individualized Plan of Care/Goals, Treatment Preferences and Shares the Quality Data Associated with the Providers?: Yes  Discharge Location  Discharge Placement: Homeless

## 2020-05-10 NOTE — PROGRESS NOTES
Pt with increased paranoia this am. Explained secondary RN being necessary to waste appropriate amount of ativan for dosing. Also let pt know when next ativan and librium was available. Hourly rounds performed through shift, pt denies needs at this time. Bed in low position and call light/ personal items within reach. Will continue to monitor and report to day shift nurse.

## 2020-05-10 NOTE — PROGRESS NOTES
Hourly rounds completed this shift, will give report to oncoming nurse. Pt has c/o anxiousness and received Ativan this shift. Pt resting in bed watching TV.

## 2020-05-11 PROCEDURE — 99218 HC RM OBSERVATION: CPT

## 2020-05-11 PROCEDURE — 74011250636 HC RX REV CODE- 250/636: Performed by: INTERNAL MEDICINE

## 2020-05-11 PROCEDURE — 96376 TX/PRO/DX INJ SAME DRUG ADON: CPT

## 2020-05-11 PROCEDURE — 74011250637 HC RX REV CODE- 250/637: Performed by: INTERNAL MEDICINE

## 2020-05-11 PROCEDURE — 65270000029 HC RM PRIVATE

## 2020-05-11 RX ORDER — CHLORDIAZEPOXIDE HYDROCHLORIDE 25 MG/1
25 CAPSULE, GELATIN COATED ORAL 4 TIMES DAILY
Status: DISCONTINUED | OUTPATIENT
Start: 2020-05-11 | End: 2020-05-12

## 2020-05-11 RX ADMIN — LORAZEPAM 1 MG: 2 INJECTION INTRAMUSCULAR; INTRAVENOUS at 17:52

## 2020-05-11 RX ADMIN — FOLIC ACID 1 MG: 1 TABLET ORAL at 08:13

## 2020-05-11 RX ADMIN — LORAZEPAM 1 MG: 2 INJECTION INTRAMUSCULAR; INTRAVENOUS at 13:34

## 2020-05-11 RX ADMIN — PANTOPRAZOLE SODIUM 40 MG: 40 TABLET, DELAYED RELEASE ORAL at 05:28

## 2020-05-11 RX ADMIN — Medication 100 MG: at 08:13

## 2020-05-11 RX ADMIN — HYDROCODONE BITARTRATE AND ACETAMINOPHEN 1 TABLET: 5; 325 TABLET ORAL at 08:22

## 2020-05-11 RX ADMIN — POLYETHYLENE GLYCOL 3350 17 G: 17 POWDER, FOR SOLUTION ORAL at 08:13

## 2020-05-11 RX ADMIN — SODIUM CHLORIDE, SODIUM LACTATE, POTASSIUM CHLORIDE, AND CALCIUM CHLORIDE 100 ML/HR: 600; 310; 30; 20 INJECTION, SOLUTION INTRAVENOUS at 00:20

## 2020-05-11 RX ADMIN — LORAZEPAM 1 MG: 2 INJECTION INTRAMUSCULAR; INTRAVENOUS at 06:19

## 2020-05-11 RX ADMIN — CHLORDIAZEPOXIDE HYDROCHLORIDE 10 MG: 5 CAPSULE ORAL at 08:13

## 2020-05-11 RX ADMIN — CHLORDIAZEPOXIDE HYDROCHLORIDE 25 MG: 25 CAPSULE ORAL at 20:52

## 2020-05-11 RX ADMIN — Medication 10 ML: at 20:53

## 2020-05-11 RX ADMIN — LORAZEPAM 1 MG: 2 INJECTION INTRAMUSCULAR; INTRAVENOUS at 23:44

## 2020-05-11 RX ADMIN — ACETAMINOPHEN 650 MG: 325 TABLET, FILM COATED ORAL at 03:28

## 2020-05-11 RX ADMIN — LEVOTHYROXINE SODIUM 112 MCG: 0.11 TABLET ORAL at 05:28

## 2020-05-11 RX ADMIN — LORAZEPAM 1 MG: 2 INJECTION INTRAMUSCULAR; INTRAVENOUS at 02:19

## 2020-05-11 RX ADMIN — CHLORDIAZEPOXIDE HYDROCHLORIDE 25 MG: 25 CAPSULE ORAL at 12:00

## 2020-05-11 RX ADMIN — CHLORDIAZEPOXIDE HYDROCHLORIDE 25 MG: 25 CAPSULE ORAL at 17:00

## 2020-05-11 NOTE — PROGRESS NOTES
Reviewed notes prior to visit for spiritual concerns  Patient resting - did not visit    Talked with staff about patient  Chronic problem with alcohol per staff        Precautions :  PPE was worn  No physical contact with patient  Social distancing observed        Lexi Young, staff

## 2020-05-11 NOTE — PROGRESS NOTES
All hourly rounds completed, pt received Ativan x 2 per [facility] for anxiety, otherwise uneventful shift, will report off to oncoming nurse.

## 2020-05-11 NOTE — PROGRESS NOTES
Progress Note    Patient: Henrry Bose MRN: [id number]  SSN: [id number]    YOB: 1970  Age: 52 y.o. Sex: female      Admit Date: 5/8/2020    LOS: 0 days     Subjective:     Patient with alcoholism, depression, history of pancreatitis, hypothyroidism. Just discharged on 5/8/2020 when she was admitted at that time for alcohol withdrawal and suicidal ideation. She reported not suicidal at the time of discharge and not now. Admitted this time after drinking 1/5 bottle of vodka and had abdominal pain. She had some elevation of lipase. She complains of abdominal pain. She is feeling nervous. She feels that she is experiencing withdrawal.     Objective:     Vitals:    05/10/20 1918 05/10/20 2315 05/11/20 0535 05/11/20 0815   BP: 96/64 112/73 101/74 112/75   Pulse: 88 95 76 82   Resp: 16 16 16 18   Temp: 98 °F (36.7 °C) 97.8 °F (36.6 °C) 98.1 °F (36.7 °C) 97.9 °F (36.6 °C)   SpO2: 98% 95% 96% 97%   Weight:       Height:            Intake and Output:  Current Shift: No intake/output data recorded. Last three shifts: 05/09 1901 - 05/11 0700  In: 4877 [P.O.:720; I.V.:4157]  Out: -     Physical Exam:     General:                    The patient is a middle aged female in no acute distress. Patient appears anxious. Some trembling. Head:                                   Normocephalic/atraumatic. Eyes:                                   No palpebral pallor or scleral icterus. ENT:                                    External auricular and nasal exam within normal limits. Mucous membranes are moist.  Neck:                                   Supple, non-tender, no JVD. Lungs:                       Clear to auscultation bilaterally without wheezes or crackles. No respiratory distress or accessory muscle use.   Heart:                                  Regular rate and rhythm, without murmurs, rubs, or gallops. Abdomen:                  Soft, non-tender, non-distended with normoactive bowel sounds. Genitourinary:           No tenderness over the bladder or bilateral CVAs. Extremities:               Without clubbing, cyanosis, or edema. Skin:                                    Normal color, texture, and turgor. No rashes, lesions, or jaundice. Pulses:                      Radial and dorsalis pedis pulses present 2+ bilaterally. Capillary refill <2s. Neurologic:                CN II-XII grossly intact and symmetrical.                                               Moving all four extremities well with no focal deficits. Psychiatric:                Pleasant demeanor, appropriate affect. Alert and oriented x 3      Lab/Data Review:    CT abdomen and pelvis   5/8/2020    IMPRESSION:   1. No acute process. 2. Fatty liver. 3. Prior cholecystectomy and gastric bypass. 4. Mild constipation.       Current Facility-Administered Medications:     chlordiazePOXIDE (LIBRIUM) capsule 10 mg, 10 mg, Oral, QID, Ellen Leos MD, 10 mg at 05/11/20 0813    morphine injection 2 mg, 2 mg, SubCUTAneous, Q4H PRN, Ellen Leos MD    desvenlafaxine succinate (PRISTIQ) ER tablet 50 mg (Patient Supplied), 50 mg, Oral, DAILY, Dante Edward MD    folic acid (FOLVITE) tablet 1 mg, 1 mg, Oral, DAILY, Dante Edward MD, 1 mg at 05/11/20 0813    levothyroxine (SYNTHROID) tablet 112 mcg, 112 mcg, Oral, DAILY, Dante Edward MD, 112 mcg at 05/11/20 0528    thiamine HCL (B-1) tablet 100 mg, 100 mg, Oral, DAILY, Dante Edward MD, 100 mg at 05/11/20 0813    nicotine (NICODERM CQ) 14 mg/24 hr patch 1 Patch, 1 Patch, TransDERmal, Q24H, Dante Edward MD, 1 Patch at 05/10/20 2115    sodium chloride (NS) flush 5-40 mL, 5-40 mL, IntraVENous, Q8H, Dante Edward MD, 10 mL at 05/10/20 2115    sodium chloride (NS) flush 5-40 mL, 5-40 mL, IntraVENous, PRN, Kali Brown MD    acetaminophen (TYLENOL) tablet 650 mg, 650 mg, Oral, Q4H PRN, Kali Brown MD, 650 mg at 05/11/20 0328    HYDROcodone-acetaminophen (NORCO) 5-325 mg per tablet 1 Tab, 1 Tab, Oral, Q4H PRN, Kali Brown MD, 1 Tab at 05/11/20 0412    naloxone [facility]) injection 0.4 mg, 0.4 mg, IntraVENous, PRN, Kali Brown MD    ondansetron [facility]) injection 4 mg, 4 mg, IntraVENous, Q4H PRN, Kali Brown MD, 4 mg at 05/10/20 2300    lactated Ringers infusion, 100 mL/hr, IntraVENous, CONTINUOUS, AmphEllen winters MD, Last Rate: 100 mL/hr at 05/11/20 0020, 100 mL/hr at 05/11/20 0020    LORazepam (ATIVAN) injection 1 mg, 1 mg, IntraVENous, Q4H PRN, Kali Brown MD, 1 mg at 05/11/20 0619    pantoprazole (PROTONIX) tablet 40 mg, 40 mg, Oral, ACB, Kali Brown MD, 40 mg at 05/11/20 0528    polyethylene glycol (MIRALAX) packet 17 g, 17 g, Oral, DAILY, Kali Brown MD, 17 g at 05/11/20 0813       Assessment:     Principal Problem:    Alcohol abuse (5/8/2020)    Active Problems:    Acquired hypothyroidism (5/6/2020)      Abdominal pain (5/8/2020)      Nausea and vomiting (5/8/2020)      MDD (major depressive disorder) (5/8/2020)        Plan:     Alcohol abuse. With elevation of lipase, likely from gastritis from active drinking of alcohol. Continue with IV fluid. Protonix. Pain control. Advance diet as tolerated. Folic acid, Thiamine. Will increase Librium. Hypothyroidism   Continue home medications. Alcoholism   Smoking   I advised patient to quit. Nicotine patch. I have discussed the plan of care with patient.       DVT prophylaxis : SCD       Signed By: Ceci Vallejo MD     May 11, 2020

## 2020-05-11 NOTE — PROGRESS NOTES
CM received consult to assist with outpatient resources to [facility]/alcohol treatment program. CM asked the patient if she would like resources in reference to ETOH use. Patient states, \" I will accept your resources, but I plan to return home with the resources I have. I have outpatient [facility] with [facility]. \" CM was receptive to this information. CM will provide resources in reference to ETOH treatment  and housing. CM continues to follow the patient.

## 2020-05-11 NOTE — PROGRESS NOTES
Hourly rounding was performed on pt. No complaints of vomiting. Pain and nausea treated per MAR. Pt c/o of anxiety throughout the night. Treated per MAR. Bed is low, locked, call bell within reach. All needs met this shift. Will continue to monitor until next RN comes on.

## 2020-05-11 NOTE — PROGRESS NOTES
Pt found to be smoking. Pt gave RN cigarettes. Pt stated she could not find lighter. Security called. Lighter found in pt's purse. Cigarettes and lighter labeled and put in med cabinet. Will continue to monitor.

## 2020-05-11 NOTE — PHYSICIAN ADVISORY
Letter of Status Determination:  
Recommend hospitalization status upgraded from OBSERVATION  to INPATIENT  Status Pt Name:  Bird Jolley MR#  
MARY KATE # [id number] / 
[id number] Payor: Kristen Ibarra / Plan: [place] [facility] MEDICAID / Product Type: Medicaid /   
CSN#  [id number] [facility]  622/01  @ [address] [place] [address] Hospitalization date  5/8/2020  6:25 PM  
Current Attending Physician  Rico Avalos MD  
Principal diagnosis  Alcohol abuse Clinicals  Pt is a 52 y.o. female with alcoholism, hypothyroidism, h/o alcoholic pancreatitis who presents to the ED with concern for EtOH withdrawal. 
  
Pt reports she is severely depressed and has been on an alcohol binge for at least the past 7 days, drinking a fifth of liquor daily. Last drink was yesterday evening. She awoke this morning with tremors, nausea, diffuse abd pain. Very little PO intake over past 5-6 days. Also with constipation, no BM in more than a week. She reports no active plan to kill herself but does wish she \"would just die\" and was hoping to drink herself to death.  
  
In ED, -110s, BP good. Na 128, K 3.3. Lipase 435, TSH 70.5. KUB without acute findings. 
  
Pt w/ pancreatitis v/s gastritis, likely in withdrawals, on IVF, high risk for DT's  
  
[place] (MCG) criteria Does  NOT apply STATUS DETERMINATION  INPATIENT The final decision of the patient's hospitalization status depends on the attending physician's judgment Additional comments Payor: [facility] MEDICAID / Plan: [facility] MEDICAID / Product Type: Medicaid /   
  
 
Joycelyn Schreiber MD 
Cell: [phone] Physician Advisor

## 2020-05-11 NOTE — PROGRESS NOTES
Problem: Falls - Risk of  Goal: *Absence of Falls  Description: Document Beka Lazo Fall Risk and appropriate interventions in the flowsheet.   Outcome: Progressing Towards Goal  Note: Fall Risk Interventions:  Mobility Interventions: Assess mobility with egress test, Patient to call before getting OOB, Strengthening exercises (ROM-active/passive)    Mentation Interventions: Adequate sleep, hydration, pain control, Door open when patient unattended, Evaluate medications/consider consulting pharmacy, Increase mobility, More frequent rounding, Room close to nurse's station, Update white board    Medication Interventions: Evaluate medications/consider consulting pharmacy

## 2020-05-12 VITALS
WEIGHT: 144 LBS | OXYGEN SATURATION: 96 % | HEART RATE: 71 BPM | RESPIRATION RATE: 18 BRPM | SYSTOLIC BLOOD PRESSURE: 94 MMHG | TEMPERATURE: 97.8 F | HEIGHT: 62 IN | BODY MASS INDEX: 26.5 KG/M2 | DIASTOLIC BLOOD PRESSURE: 67 MMHG

## 2020-05-12 LAB
ALBUMIN SERPL-MCNC: 2.5 G/DL (ref 3.5–5)
ALBUMIN/GLOB SERPL: 1 {RATIO} (ref 1.2–3.5)
ALP SERPL-CCNC: 42 U/L (ref 50–136)
ALT SERPL-CCNC: 54 U/L (ref 12–65)
ANION GAP SERPL CALC-SCNC: 5 MMOL/L (ref 7–16)
AST SERPL-CCNC: 50 U/L (ref 15–37)
BILIRUB SERPL-MCNC: 0.2 MG/DL (ref 0.2–1.1)
BUN SERPL-MCNC: 9 MG/DL (ref 6–23)
CALCIUM SERPL-MCNC: 8.6 MG/DL (ref 8.3–10.4)
CHLORIDE SERPL-SCNC: 113 MMOL/L (ref 98–107)
CO2 SERPL-SCNC: 27 MMOL/L (ref 21–32)
CREAT SERPL-MCNC: 0.53 MG/DL (ref 0.6–1)
ERYTHROCYTE [DISTWIDTH] IN BLOOD BY AUTOMATED COUNT: 14.7 % (ref 11.9–14.6)
GLOBULIN SER CALC-MCNC: 2.6 G/DL (ref 2.3–3.5)
GLUCOSE SERPL-MCNC: 79 MG/DL (ref 65–100)
HCT VFR BLD AUTO: 30.5 % (ref 35.8–46.3)
HGB BLD-MCNC: 10.1 G/DL (ref 11.7–15.4)
MCH RBC QN AUTO: 33 PG (ref 26.1–32.9)
MCHC RBC AUTO-ENTMCNC: 33.1 G/DL (ref 31.4–35)
MCV RBC AUTO: 99.7 FL (ref 79.6–97.8)
NRBC # BLD: 0 K/UL (ref 0–0.2)
PLATELET # BLD AUTO: 152 K/UL (ref 150–450)
PMV BLD AUTO: 9.9 FL (ref 9.4–12.3)
POTASSIUM SERPL-SCNC: 3.9 MMOL/L (ref 3.5–5.1)
PROT SERPL-MCNC: 5.1 G/DL (ref 6.3–8.2)
RBC # BLD AUTO: 3.06 M/UL (ref 4.05–5.2)
SODIUM SERPL-SCNC: 145 MMOL/L (ref 136–145)
WBC # BLD AUTO: 3.4 K/UL (ref 4.3–11.1)

## 2020-05-12 PROCEDURE — 85027 COMPLETE CBC AUTOMATED: CPT

## 2020-05-12 PROCEDURE — 74011250636 HC RX REV CODE- 250/636: Performed by: INTERNAL MEDICINE

## 2020-05-12 PROCEDURE — 36415 COLL VENOUS BLD VENIPUNCTURE: CPT

## 2020-05-12 PROCEDURE — 74011250637 HC RX REV CODE- 250/637: Performed by: INTERNAL MEDICINE

## 2020-05-12 PROCEDURE — 80053 COMPREHEN METABOLIC PANEL: CPT

## 2020-05-12 RX ORDER — CHLORDIAZEPOXIDE HYDROCHLORIDE 5 MG/1
10 CAPSULE, GELATIN COATED ORAL 3 TIMES DAILY
Status: DISCONTINUED | OUTPATIENT
Start: 2020-05-12 | End: 2020-05-12 | Stop reason: HOSPADM

## 2020-05-12 RX ADMIN — Medication 100 MG: at 08:28

## 2020-05-12 RX ADMIN — ONDANSETRON 4 MG: 2 INJECTION INTRAMUSCULAR; INTRAVENOUS at 05:30

## 2020-05-12 RX ADMIN — SODIUM CHLORIDE, SODIUM LACTATE, POTASSIUM CHLORIDE, AND CALCIUM CHLORIDE 75 ML/HR: 600; 310; 30; 20 INJECTION, SOLUTION INTRAVENOUS at 00:21

## 2020-05-12 RX ADMIN — HYDROCODONE BITARTRATE AND ACETAMINOPHEN 1 TABLET: 5; 325 TABLET ORAL at 05:31

## 2020-05-12 RX ADMIN — FOLIC ACID 1 MG: 1 TABLET ORAL at 08:26

## 2020-05-12 RX ADMIN — Medication 10 ML: at 05:27

## 2020-05-12 RX ADMIN — ONDANSETRON 4 MG: 2 INJECTION INTRAMUSCULAR; INTRAVENOUS at 11:07

## 2020-05-12 RX ADMIN — LEVOTHYROXINE SODIUM 112 MCG: 0.11 TABLET ORAL at 05:27

## 2020-05-12 RX ADMIN — SODIUM CHLORIDE 500 ML: 900 INJECTION, SOLUTION INTRAVENOUS at 11:35

## 2020-05-12 RX ADMIN — PANTOPRAZOLE SODIUM 40 MG: 40 TABLET, DELAYED RELEASE ORAL at 05:27

## 2020-05-12 RX ADMIN — CHLORDIAZEPOXIDE HYDROCHLORIDE 25 MG: 25 CAPSULE ORAL at 08:27

## 2020-05-12 RX ADMIN — POLYETHYLENE GLYCOL 3350 17 G: 17 POWDER, FOR SOLUTION ORAL at 08:27

## 2020-05-12 RX ADMIN — LORAZEPAM 1 MG: 2 INJECTION INTRAMUSCULAR; INTRAVENOUS at 08:27

## 2020-05-12 NOTE — PROGRESS NOTES
Hourly rounds performed. All needs met. Pt complained of nausea and anxiety during shift; managed per MAR. Will continue to monitor and report to oncoming nurse.

## 2020-05-12 NOTE — DISCHARGE SUMMARY
Hospitalist Discharge Summary     Patient ID:  Yumiko Ramirez  [id number]  52 y.o.  1970  Admit date: 5/8/2020  6:25 PM  Discharge date and time: 5/12/2020  Attending: No att. providers found  PCP:  Edilia Posey MD  Treatment Team: Charge Nurse: Mark Hua; Care Manager: Saji Schmidt; Utilization Review: Rodger Osman; Utilization Review: Katharine Gallardo RN; Primary Nurse: Mark Hua    Principal Diagnosis Alcohol abuse   Principal Problem:    Alcohol abuse (5/8/2020)    Active Problems:    Alcohol withdrawal ([address] (5/5/2020)      Acquired hypothyroidism (5/6/2020)      Abdominal pain (5/8/2020)      Nausea and vomiting (5/8/2020)      MDD (major depressive disorder) (5/8/2020)             Hospital Course:  Please refer to the admission H&P for details of presentation. In summary, the patient is 49F with pmhx of alcoholism, depression, hx of pancreatitis, hypothyroidism recently admitted and discharged 8May. Admitted at that time for alcohol withdrawal. Pt returned home and reportedly drank 1/5 bottle of vodka and returned to hospital with abdominal pain and elevated lipase. She reported anxiety and was resumed on librium. Pt examined on day of leaving AMA. She requested I write a letter to the [facility] stating she was on librium for pain mgmt and not in withdrawal which I declined. She then wanted me to stop librium but then asked when she can have something for anxiety. I offered a psychiatric eval. She also was worried about constipation w/ hx of SBO and I offered a KUB and enema pending results. Within an hour of me leaving her room the RN called stating patient wanted to leave AMA.  Patient was oriented x 3 and competent to make that decision    Significant Diagnostic Studies:   Final [99] 5/08/2020 22:07 5/08/2020 22:15   Study Result     EXAM: CT abdomen and pelvis with IV contrast.     INDICATION: Abdominal pain.     COMPARISON: None.     TECHNIQUE: Axial CT images of the abdomen and pelvis were obtained after the  intravenous injection of 100 mL Isovue 370 CT contrast. Oral contrast was  administered as well. Radiation dose reduction techniques were used for this  study. Our CT scanners use one or all of the following:  Automated exposure  control, adjustment of the mA or kV according to patient size, iterative  reconstruction.     FINDINGS:     - Liver: There is fatty infiltration of the liver. - Gallbladder and bile ducts: There has been a prior cholecystectomy. The common  bile duct is within normal limits for a postcholecystectomy patient, measuring  10 mm in diameter. - Spleen: Within normal limits. - Urinary tract: Within normal limits. - Adrenals: Within normal limits. - Pancreas: Within normal limits. - Gastrointestinal tract: There has been a prior gastric bypass. Also noted is  mild constipation. No bowel obstruction or bowel wall thickening is seen. The  appendix is not clearly identified.  - Retroperitoneum: Within normal limits. - Peritoneal cavity and abdominal wall: Within normal limits. - Pelvis: Within normal limits. - Spine/bones: No acute process. - Other comments: Partially visualized are bilateral breast implants.     IMPRESSION  IMPRESSION:   1. No acute process. 2. Fatty liver. 3. Prior cholecystectomy and gastric bypass. 4. Mild constipation. Labs: Results:       Chemistry Recent Labs     05/12/20  1144   GLU 79      K 3.9   *   CO2 27   BUN 9   CREA 0.53*   CA 8.6   AGAP 5*   AP 42*   TP 5.1*   ALB 2.5*   GLOB 2.6   AGRAT 1.0*      CBC w/Diff Recent Labs     05/12/20  1144   WBC 3.4*   RBC 3.06*   HGB 10.1*   HCT 30.5*         Cardiac Enzymes No results for input(s): CPK, CKND1, TUNG in the last 72 hours. No lab exists for component: CKRMB, TROIP   Coagulation No results for input(s): PTP, INR, APTT, INREXT in the last 72 hours.     Lipid Panel No results found for: CHOL, CHOLPOCT, CHOLX, CHLST, [address], [id number], HDL, HDLP, LDL, LDLC, DLDLP, [id number], VLDLC, VLDL, TGLX, TRIGL, TRIGP, TGLPOCT, CHHD, CHHDX   BNP No results for input(s): BNPP in the last 72 hours. Liver Enzymes Recent Labs     05/12/20  1144   TP 5.1*   ALB 2.5*   AP 42*   SGOT 50*      Thyroid Studies Lab Results   Component Value Date/Time    TSH 70.500 05/05/2020 06:32 AM            Discharge Exam:  Visit Vitals  BP 94/67 (BP 1 Location: Right arm, BP Patient Position: At rest)   Pulse 71   Temp 97.8 °F (36.6 °C)   Resp 18   Ht 5' 2\" (1.575 m)   Wt 65.3 kg (144 lb)   SpO2 96%   BMI 26.34 kg/m²     General appearance: alert, cooperative, no distress, appears stated age. Flat affect  Lungs: clear to auscultation bilaterally  Heart: regular rate and rhythm, S1, S2 normal, no murmur, click, rub or gallop  Abdomen: soft, non-tender.  Bowel sounds normal. No masses,  no organomegaly  Extremities: no cyanosis or edema  Neurologic: Grossly normal    Time spent to discharge patient 35 minutes  Signed:  Christina Sarabia DO  5/12/2020  1:48 PM

## 2020-05-30 ENCOUNTER — HOSPITAL ENCOUNTER (EMERGENCY)
Age: 50
Discharge: HOME | End: 2020-05-30
Attending: EMERGENCY MEDICINE
Payer: MEDICAID

## 2020-05-30 VITALS
TEMPERATURE: 98.8 F | SYSTOLIC BLOOD PRESSURE: 118 MMHG | OXYGEN SATURATION: 95 % | HEART RATE: 88 BPM | BODY MASS INDEX: 26.68 KG/M2 | HEIGHT: 62 IN | DIASTOLIC BLOOD PRESSURE: 80 MMHG | RESPIRATION RATE: 16 BRPM | WEIGHT: 145 LBS

## 2020-05-30 DIAGNOSIS — F10.10 ALCOHOL ABUSE: Primary | ICD-10-CM

## 2020-05-30 LAB
ALBUMIN SERPL-MCNC: 3.6 G/DL (ref 3.5–5)
ALBUMIN/GLOB SERPL: 1.2 {RATIO} (ref 1.2–3.5)
ALP SERPL-CCNC: 82 U/L (ref 50–130)
ALT SERPL-CCNC: 18 U/L (ref 12–65)
ANION GAP SERPL CALC-SCNC: 11 MMOL/L (ref 7–16)
AST SERPL-CCNC: 38 U/L (ref 15–37)
BASOPHILS # BLD: 0 K/UL (ref 0–0.2)
BASOPHILS NFR BLD: 1 % (ref 0–2)
BILIRUB SERPL-MCNC: 0.3 MG/DL (ref 0.2–1.1)
BUN SERPL-MCNC: 9 MG/DL (ref 6–23)
CALCIUM SERPL-MCNC: 8.2 MG/DL (ref 8.3–10.4)
CHLORIDE SERPL-SCNC: 106 MMOL/L (ref 98–107)
CO2 SERPL-SCNC: 26 MMOL/L (ref 21–32)
CREAT SERPL-MCNC: 0.53 MG/DL (ref 0.6–1)
DIFFERENTIAL METHOD BLD: ABNORMAL
EOSINOPHIL # BLD: 0 K/UL (ref 0–0.8)
EOSINOPHIL NFR BLD: 1 % (ref 0.5–7.8)
ERYTHROCYTE [DISTWIDTH] IN BLOOD BY AUTOMATED COUNT: 14.8 % (ref 11.9–14.6)
ETHANOL SERPL-MCNC: 265 MG/DL
GLOBULIN SER CALC-MCNC: 2.9 G/DL (ref 2.3–3.5)
GLUCOSE SERPL-MCNC: 81 MG/DL (ref 65–100)
HCT VFR BLD AUTO: 34.2 % (ref 35.8–46.3)
HGB BLD-MCNC: 11.7 G/DL (ref 11.7–15.4)
IMM GRANULOCYTES # BLD AUTO: 0 K/UL (ref 0–0.5)
IMM GRANULOCYTES NFR BLD AUTO: 0 % (ref 0–5)
LYMPHOCYTES # BLD: 1.2 K/UL (ref 0.5–4.6)
LYMPHOCYTES NFR BLD: 55 % (ref 13–44)
MAGNESIUM SERPL-MCNC: 2.2 MG/DL (ref 1.8–2.4)
MCH RBC QN AUTO: 33.1 PG (ref 26.1–32.9)
MCHC RBC AUTO-ENTMCNC: 34.2 G/DL (ref 31.4–35)
MCV RBC AUTO: 96.6 FL (ref 79.6–97.8)
MONOCYTES # BLD: 0.2 K/UL (ref 0.1–1.3)
MONOCYTES NFR BLD: 10 % (ref 4–12)
NEUTS SEG # BLD: 0.7 K/UL (ref 1.7–8.2)
NEUTS SEG NFR BLD: 33 % (ref 43–78)
NRBC # BLD: 0 K/UL (ref 0–0.2)
PLATELET # BLD AUTO: 101 K/UL (ref 150–450)
PMV BLD AUTO: 9.2 FL (ref 9.4–12.3)
POTASSIUM SERPL-SCNC: 2.9 MMOL/L (ref 3.5–5.1)
PROT SERPL-MCNC: 6.5 G/DL (ref 6.3–8.2)
RBC # BLD AUTO: 3.54 M/UL (ref 4.05–5.2)
SODIUM SERPL-SCNC: 143 MMOL/L (ref 136–145)
WBC # BLD AUTO: 2.1 K/UL (ref 4.3–11.1)

## 2020-05-30 PROCEDURE — 99284 EMERGENCY DEPT VISIT MOD MDM: CPT

## 2020-05-30 PROCEDURE — 96374 THER/PROPH/DIAG INJ IV PUSH: CPT

## 2020-05-30 PROCEDURE — 74011250636 HC RX REV CODE- 250/636: Performed by: EMERGENCY MEDICINE

## 2020-05-30 PROCEDURE — 80053 COMPREHEN METABOLIC PANEL: CPT

## 2020-05-30 PROCEDURE — 85025 COMPLETE CBC W/AUTO DIFF WBC: CPT

## 2020-05-30 PROCEDURE — 81003 URINALYSIS AUTO W/O SCOPE: CPT

## 2020-05-30 PROCEDURE — 83735 ASSAY OF MAGNESIUM: CPT

## 2020-05-30 PROCEDURE — 74011250637 HC RX REV CODE- 250/637: Performed by: EMERGENCY MEDICINE

## 2020-05-30 PROCEDURE — 80307 DRUG TEST PRSMV CHEM ANLYZR: CPT

## 2020-05-30 RX ORDER — LORAZEPAM 2 MG/ML
1 INJECTION INTRAMUSCULAR
Status: COMPLETED | OUTPATIENT
Start: 2020-05-30 | End: 2020-05-30

## 2020-05-30 RX ORDER — POTASSIUM CHLORIDE 20 MEQ/1
40 TABLET, EXTENDED RELEASE ORAL
Status: COMPLETED | OUTPATIENT
Start: 2020-05-30 | End: 2020-05-30

## 2020-05-30 RX ORDER — CHLORDIAZEPOXIDE HYDROCHLORIDE 25 MG/1
50 CAPSULE, GELATIN COATED ORAL
Status: COMPLETED | OUTPATIENT
Start: 2020-05-30 | End: 2020-05-30

## 2020-05-30 RX ADMIN — SODIUM CHLORIDE, SODIUM LACTATE, POTASSIUM CHLORIDE, AND CALCIUM CHLORIDE 1000 ML: 600; 310; 30; 20 INJECTION, SOLUTION INTRAVENOUS at 16:38

## 2020-05-30 RX ADMIN — POTASSIUM CHLORIDE 40 MEQ: 1500 TABLET, EXTENDED RELEASE ORAL at 16:38

## 2020-05-30 RX ADMIN — CHLORDIAZEPOXIDE HYDROCHLORIDE 50 MG: 25 CAPSULE ORAL at 18:32

## 2020-05-30 RX ADMIN — LORAZEPAM 1 MG: 2 INJECTION INTRAMUSCULAR; INTRAVENOUS at 16:38

## 2020-05-30 NOTE — PROGRESS NOTES
SW met with patient who states that she has an alcohol addiction and \"stopped drinking yesterday\" via running out of vodka. Patient then drank vanilla extract today because she was experiencing withdrawal symptoms. Patient states that she lives with her boyfriend who is not supportive and they've broken up today. Patient states that she's been to [facility] and [facility] and she \"can't stop. \" Patient admits that she has no coping mechanisms and drinks when she experiences emotional distress. Patient has not received therapy. SW will provide the patient with information for [facility], Senthil Dykes, Joie Black, and Jaleel Saleem. Patient tearful and worried about being discharged. SW provided comfort to the patient and explained the next steps. SW will have patient complete a [facility] interview if deemed medically stable by MD. Patient states that she's had suicidal ideations but they are passing and she does not have a plan to harm herself.      Nedra Escobar, [address] [facility]    [address] [facility]    * Na@[employer]    ( [phone]

## 2020-05-30 NOTE — ED TRIAGE NOTES
Per EMS pt Drinks 2 pints ETOH daily, last drink was yesterday. Per EMS, PT states SI \"for a couple weeks\".

## 2020-05-30 NOTE — ED NOTES
I have reviewed discharge instructions with the patient. The patient verbalized understanding. Patient left ED via Discharge Method: ambulatory to Home with self. Opportunity for questions and clarification provided. Patient given 0 scripts. To continue your aftercare when you leave the hospital, you may receive an automated call from our care team to check in on how you are doing. This is a free service and part of our promise to provide the best care and service to meet your aftercare needs.  If you have questions, or wish to unsubscribe from this service please call [phone]. Thank you for Choosing our [facility] Emergency Department.

## 2020-05-30 NOTE — ED PROVIDER NOTES
Mask was worn during the entire patient examination. Louisa Victor is a 52 y.o. female who presents to the ED with a chief complaint of alcohol withdrawal.  She has a history of drinking vodka daily. And states she had 2 bottles yesterday. She drank late into the night and states she felt shaky today and did drink some vanilla extract. No fever. She also states she felt that the world would be off if she wasn't around. But she has no plan to actually do anything. Past Medical History:   Diagnosis Date    Gastrointestinal disorder     pancreatitis , bowel obstruction    Hypertension     Ill-defined condition     alcholism     Psychiatric disorder     depression       No past surgical history on file. No family history on file.     Social History     Socioeconomic History    Marital status:      Spouse name: Not on file    Number of children: Not on file    Years of education: Not on file    Highest education level: Not on file   Occupational History    Not on file   Social Needs    Financial resource strain: Not on file    Food insecurity     Worry: Not on file     Inability: Not on file    Transportation needs     Medical: Not on file     Non-medical: Not on file   Tobacco Use    Smoking status: Not on file   Substance and Sexual Activity    Alcohol use: Yes     Comment: daily    Drug use: Yes     Types: Cocaine    Sexual activity: Not on file   Lifestyle    Physical activity     Days per week: Not on file     Minutes per session: Not on file    Stress: Not on file   Relationships    Social connections     Talks on phone: Not on file     Gets together: Not on file     Attends Shinto service: Not on file     Active member of club or organization: Not on file     Attends meetings of clubs or organizations: Not on file     Relationship status: Not on file    Intimate partner violence     Fear of current or ex partner: Not on file     Emotionally abused: Not on file Physically abused: Not on file     Forced sexual activity: Not on file   Other Topics Concern    Not on file   Social History Narrative    Not on file         ALLERGIES: Patient has no known allergies. Review of Systems   Constitutional: Negative for chills and fever. Respiratory: Negative for chest tightness and shortness of breath. Cardiovascular: Negative for chest pain and palpitations. Gastrointestinal: Negative for abdominal pain, diarrhea, nausea and vomiting. Musculoskeletal: Negative for arthralgias, back pain, neck pain and neck stiffness. Skin: Negative for pallor and rash. All other systems reviewed and are negative. Vitals:    05/30/20 1501 05/30/20 1502   BP:  138/80   Pulse:  88   Resp:  16   Temp: 98.8 °F (37.1 °C)    Weight:  65.8 kg (145 lb)   Height:  5' 2\" (1.575 m)            Physical Exam  Vitals signs and nursing note reviewed. Constitutional:       General: She is not in acute distress. Appearance: Normal appearance. She is well-developed. She is not ill-appearing, toxic-appearing or diaphoretic. HENT:      Head: Normocephalic and atraumatic. Right Ear: Tympanic membrane normal.      Left Ear: Tympanic membrane normal.   Eyes:      General: No scleral icterus. Conjunctiva/sclera: Conjunctivae normal.   Neck:      Musculoskeletal: No neck rigidity. Pulmonary:      Effort: Pulmonary effort is normal. No respiratory distress. Breath sounds: No stridor. No wheezing, rhonchi or rales. Chest:      Chest wall: No tenderness. Abdominal:      General: There is no distension. Tenderness: There is no abdominal tenderness. There is no guarding or rebound. Hernia: No hernia is present. Skin:     General: Skin is warm. Capillary Refill: Capillary refill takes less than 2 seconds. Coloration: Skin is not jaundiced or pale. Neurological:      General: No focal deficit present. Mental Status: She is alert.  Mental status is at baseline. Psychiatric:         Mood and Affect: Mood normal.         Behavior: Behavior normal.          MDM  Number of Diagnoses or Management Options  Alcohol abuse:   Diagnosis management comments: Patient has been seen by psychiatry who feel that she does not meet inpatient commitment criteria I agree with their assessment after seeing her several times. We also had her evaluated by [facility] for treatment for alcohol they currently do not have any beds but likely will be able to get some by Monday. I discussed this with patient will give her dose of Librium now and she will follow-up with [facility] in the morning. Desiree Gold MD; 5/30/2020 @6:26 PM Voice dictation software was used during the making of this note. This software is not perfect and grammatical and other typographical errors may be present.   This note has not been proofread for errors.  ===================================================================          Amount and/or Complexity of Data Reviewed  Clinical lab tests: ordered and reviewed (Results for orders placed or performed during the hospital encounter of 05/30/20  -CBC WITH AUTOMATED DIFF       Result                      Value             Ref Range           WBC                         2.1 (L)           4.3 - 11.1 K*       RBC                         3.54 (L)          4.05 - 5.2 M*       HGB                         11.7              11.7 - 15.4 *       HCT                         34.2 (L)          35.8 - 46.3 %       MCV                         96.6              79.6 - 97.8 *       MCH                         33.1 (H)          26.1 - 32.9 *       MCHC                        34.2              31.4 - 35.0 *       RDW                         14.8 (H)          11.9 - 14.6 %       PLATELET                    101 (L)           150 - 450 K/*       MPV                         9.2 (L)           9.4 - 12.3 FL       ABSOLUTE NRBC               0.00              0.0 - 0.2 K/*       DF                          AUTOMATED                             NEUTROPHILS                 33 (L)            43 - 78 %           LYMPHOCYTES                 55 (H)            13 - 44 %           MONOCYTES                   10                4.0 - 12.0 %        EOSINOPHILS                 1                 0.5 - 7.8 %         BASOPHILS                   1                 0.0 - 2.0 %         IMMATURE GRANULOCYTES       0                 0.0 - 5.0 %         ABS. NEUTROPHILS            0.7 (L)           1.7 - 8.2 K/*       ABS. LYMPHOCYTES            1.2               0.5 - 4.6 K/*       ABS. MONOCYTES              0.2               0.1 - 1.3 K/*       ABS. EOSINOPHILS            0.0               0.0 - 0.8 K/*       ABS. BASOPHILS              0.0               0.0 - 0.2 K/*       ABS. IMM. GRANS.            0.0               0.0 - 0.5 K/*  -METABOLIC PANEL, COMPREHENSIVE       Result                      Value             Ref Range           Sodium                      143               136 - 145 mm*       Potassium                   2.9 (LL)          3.5 - 5.1 mm*       Chloride                    106               98 - 107 mmo*       CO2                         26                21 - 32 mmol*       Anion gap                   11                7 - 16 mmol/L       Glucose                     81                65 - 100 mg/*       BUN                         9                 6 - 23 MG/DL        Creatinine                  0.53 (L)          0.6 - 1.0 MG*       GFR est AA                  >60               >60 ml/min/1*       GFR est non-AA              >60               >60 ml/min/1*       Calcium                     8.2 (L)           8.3 - 10.4 M*       Bilirubin, total            0.3               0.2 - 1.1 MG*       ALT (SGPT)                  18                12 - 65 U/L         AST (SGOT)                  38 (H)            15 - 37 U/L         Alk.  phosphatase            82                50 - 130 U/L Protein, total              6.5               6.3 - 8.2 g/*       Albumin                     3.6               3.5 - 5.0 g/*       Globulin                    2.9               2.3 - 3.5 g/*       A-G Ratio                   1.2               1.2 - 3.5      -ETHYL ALCOHOL       Result                      Value             Ref Range           ALCOHOL(ETHYL),SERUM        265               MG/DL          -MAGNESIUM       Result                      Value             Ref Range           Magnesium                   2.2               1.8 - 2.4 mg* )           Procedures

## 2022-03-18 PROBLEM — F10.939 ALCOHOL WITHDRAWAL (HCC): Status: ACTIVE | Noted: 2020-05-05

## 2022-03-18 PROBLEM — F10.10 ALCOHOL ABUSE: Status: ACTIVE | Noted: 2020-05-08

## 2022-03-18 PROBLEM — R45.851 SUICIDAL IDEATION: Status: ACTIVE | Noted: 2020-05-06

## 2022-03-19 PROBLEM — R11.2 NAUSEA AND VOMITING: Status: ACTIVE | Noted: 2020-05-08

## 2022-03-19 PROBLEM — F32.9 MDD (MAJOR DEPRESSIVE DISORDER): Status: ACTIVE | Noted: 2020-05-08

## 2022-03-19 PROBLEM — E03.9 ACQUIRED HYPOTHYROIDISM: Status: ACTIVE | Noted: 2020-05-06

## 2022-03-20 PROBLEM — R10.9 ABDOMINAL PAIN: Status: ACTIVE | Noted: 2020-05-08

## 2023-03-07 NOTE — PROGRESS NOTES
Hourly rounds performed, all needs met. Pt drowsy but much more arousable. Will continue to monitor and give report to oncoming nurse. [DrSheldon  ___] : Dr. GERMAN [DrSheldon ___] : Dr. GERMAN

## 2023-05-20 PROCEDURE — 99285 EMERGENCY DEPT VISIT HI MDM: CPT

## 2023-05-21 ENCOUNTER — HOSPITAL ENCOUNTER (EMERGENCY)
Age: 53
Discharge: HOME | End: 2023-05-21
Attending: EMERGENCY MEDICINE
Payer: MEDICAID

## 2023-05-21 ENCOUNTER — APPOINTMENT (OUTPATIENT)
Dept: GENERAL RADIOLOGY | Age: 53
End: 2023-05-21
Payer: MEDICAID

## 2023-05-21 VITALS
RESPIRATION RATE: 16 BRPM | SYSTOLIC BLOOD PRESSURE: 138 MMHG | WEIGHT: 165 LBS | HEIGHT: 64 IN | TEMPERATURE: 98.5 F | HEART RATE: 83 BPM | BODY MASS INDEX: 28.17 KG/M2 | OXYGEN SATURATION: 95 % | DIASTOLIC BLOOD PRESSURE: 82 MMHG

## 2023-05-21 DIAGNOSIS — N39.0 URINARY TRACT INFECTION WITHOUT HEMATURIA, SITE UNSPECIFIED: ICD-10-CM

## 2023-05-21 DIAGNOSIS — F10.920 ACUTE ALCOHOLIC INTOXICATION WITHOUT COMPLICATION (HCC): Primary | ICD-10-CM

## 2023-05-21 LAB
ALBUMIN SERPL-MCNC: 3.1 G/DL (ref 3.5–5)
ALBUMIN/GLOB SERPL: 0.8 (ref 0.4–1.6)
ALP SERPL-CCNC: 122 U/L (ref 50–136)
ALT SERPL-CCNC: 65 U/L (ref 12–65)
AMPHET UR QL SCN: NEGATIVE
ANION GAP SERPL CALC-SCNC: 12 MMOL/L (ref 2–11)
APPEARANCE UR: ABNORMAL
AST SERPL-CCNC: 139 U/L (ref 15–37)
BACTERIA URNS QL MICRO: ABNORMAL /HPF
BARBITURATES UR QL SCN: NEGATIVE
BASOPHILS # BLD: 0.1 K/UL (ref 0–0.2)
BASOPHILS NFR BLD: 1 % (ref 0–2)
BENZODIAZ UR QL: POSITIVE
BILIRUB SERPL-MCNC: 0.5 MG/DL (ref 0.2–1.1)
BILIRUB UR QL: NEGATIVE
BUN SERPL-MCNC: 7 MG/DL (ref 6–23)
CALCIUM SERPL-MCNC: 8.5 MG/DL (ref 8.3–10.4)
CANNABINOIDS UR QL SCN: NEGATIVE
CASTS URNS QL MICRO: 0 /LPF
CHLORIDE SERPL-SCNC: 107 MMOL/L (ref 101–110)
CO2 SERPL-SCNC: 22 MMOL/L (ref 21–32)
COCAINE UR QL SCN: NEGATIVE
COLOR UR: ABNORMAL
CREAT SERPL-MCNC: 0.5 MG/DL (ref 0.6–1)
CRYSTALS URNS QL MICRO: 0 /LPF
DIFFERENTIAL METHOD BLD: ABNORMAL
EOSINOPHIL # BLD: 0.1 K/UL (ref 0–0.8)
EOSINOPHIL NFR BLD: 2 % (ref 0.5–7.8)
EPI CELLS #/AREA URNS HPF: ABNORMAL /HPF
ERYTHROCYTE [DISTWIDTH] IN BLOOD BY AUTOMATED COUNT: 26 % (ref 11.9–14.6)
ETHANOL SERPL-MCNC: 144 MG/DL (ref 0–0.08)
GLOBULIN SER CALC-MCNC: 3.7 G/DL (ref 2.8–4.5)
GLUCOSE SERPL-MCNC: 61 MG/DL (ref 65–100)
GLUCOSE UR STRIP.AUTO-MCNC: NEGATIVE MG/DL
HCT VFR BLD AUTO: 33.2 % (ref 35.8–46.3)
HGB BLD-MCNC: 10.1 G/DL (ref 11.7–15.4)
HGB UR QL STRIP: NEGATIVE
IMM GRANULOCYTES # BLD AUTO: 0 K/UL (ref 0–0.5)
IMM GRANULOCYTES NFR BLD AUTO: 0 % (ref 0–5)
KETONES UR QL STRIP.AUTO: 40 MG/DL
LEUKOCYTE ESTERASE UR QL STRIP.AUTO: ABNORMAL
LIPASE SERPL-CCNC: 106 U/L (ref 73–393)
LYMPHOCYTES # BLD: 1.4 K/UL (ref 0.5–4.6)
LYMPHOCYTES NFR BLD: 29 % (ref 13–44)
MCH RBC QN AUTO: 24 PG (ref 26.1–32.9)
MCHC RBC AUTO-ENTMCNC: 30.4 G/DL (ref 31.4–35)
MCV RBC AUTO: 78.9 FL (ref 82–102)
METHADONE UR QL: NEGATIVE
MONOCYTES # BLD: 0.6 K/UL (ref 0.1–1.3)
MONOCYTES NFR BLD: 12 % (ref 4–12)
MUCOUS THREADS URNS QL MICRO: ABNORMAL /LPF
NEUTS SEG # BLD: 2.7 K/UL (ref 1.7–8.2)
NEUTS SEG NFR BLD: 56 % (ref 43–78)
NITRITE UR QL STRIP.AUTO: POSITIVE
NRBC # BLD: 0 K/UL (ref 0–0.2)
OPIATES UR QL: NEGATIVE
OTHER OBSERVATIONS: ABNORMAL
PCP UR QL: NEGATIVE
PH UR STRIP: 6 (ref 5–9)
PLATELET # BLD AUTO: 253 K/UL (ref 150–450)
PLATELET COMMENT: ADEQUATE
PMV BLD AUTO: 9.3 FL (ref 9.4–12.3)
POTASSIUM SERPL-SCNC: 4.1 MMOL/L (ref 3.5–5.1)
PROT SERPL-MCNC: 6.8 G/DL (ref 6.3–8.2)
PROT UR STRIP-MCNC: NEGATIVE MG/DL
RBC # BLD AUTO: 4.21 M/UL (ref 4.05–5.2)
RBC #/AREA URNS HPF: 0 /HPF
RBC MORPH BLD: ABNORMAL
SODIUM SERPL-SCNC: 141 MMOL/L (ref 133–143)
SP GR UR REFRACTOMETRY: 1.01 (ref 1–1.02)
URINE CULTURE IF INDICATED: ABNORMAL
UROBILINOGEN UR QL STRIP.AUTO: 1 EU/DL (ref 0.2–1)
WBC # BLD AUTO: 4.9 K/UL (ref 4.3–11.1)
WBC MORPH BLD: ABNORMAL
WBC URNS QL MICRO: ABNORMAL /HPF

## 2023-05-21 PROCEDURE — 80053 COMPREHEN METABOLIC PANEL: CPT

## 2023-05-21 PROCEDURE — 82962 GLUCOSE BLOOD TEST: CPT

## 2023-05-21 PROCEDURE — 85025 COMPLETE CBC W/AUTO DIFF WBC: CPT

## 2023-05-21 PROCEDURE — 80307 DRUG TEST PRSMV CHEM ANLYZR: CPT

## 2023-05-21 PROCEDURE — 81001 URINALYSIS AUTO W/SCOPE: CPT

## 2023-05-21 PROCEDURE — 87088 URINE BACTERIA CULTURE: CPT

## 2023-05-21 PROCEDURE — 87086 URINE CULTURE/COLONY COUNT: CPT

## 2023-05-21 PROCEDURE — 87186 SC STD MICRODIL/AGAR DIL: CPT

## 2023-05-21 PROCEDURE — 83690 ASSAY OF LIPASE: CPT

## 2023-05-21 PROCEDURE — 71045 X-RAY EXAM CHEST 1 VIEW: CPT

## 2023-05-21 PROCEDURE — 82077 ASSAY SPEC XCP UR&BREATH IA: CPT

## 2023-05-21 RX ORDER — NITROFURANTOIN 25; 75 MG/1; MG/1
100 CAPSULE ORAL 2 TIMES DAILY
Qty: 14 CAPSULE | Refills: 0 | Status: SHIPPED | OUTPATIENT
Start: 2023-05-21 | End: 2023-05-28

## 2023-05-21 ASSESSMENT — ENCOUNTER SYMPTOMS
VOMITING: 0
COLOR CHANGE: 0
BACK PAIN: 0
ABDOMINAL PAIN: 0
SHORTNESS OF BREATH: 0
COUGH: 0

## 2023-05-22 LAB
GLUCOSE BLD STRIP.AUTO-MCNC: 142 MG/DL (ref 65–100)
SERVICE CMNT-IMP: ABNORMAL

## 2023-05-24 LAB
BACTERIA SPEC CULT: ABNORMAL
BACTERIA SPEC CULT: ABNORMAL
SERVICE CMNT-IMP: ABNORMAL

## 2023-05-24 NOTE — PROGRESS NOTES
ED pharmacist reviewed recent results of urine culture. The patient received a prescription for nitrofurantion upon discharge. Based on culture results, the patient is being adequately treated for the identified infection with existing antimicrobial therapy. No further intervention needed. Allergies as of 05/20/2023    (Not on File)     Hernan Boo PharmD.   Emergency Medicine Clinical Pharmacist

## 2023-05-25 ENCOUNTER — HOSPITAL ENCOUNTER (EMERGENCY)
Age: 53
Discharge: ANOTHER HOSPITAL | End: 2023-05-25
Attending: EMERGENCY MEDICINE
Payer: MEDICAID

## 2023-05-25 VITALS
DIASTOLIC BLOOD PRESSURE: 84 MMHG | HEART RATE: 80 BPM | HEIGHT: 64 IN | SYSTOLIC BLOOD PRESSURE: 137 MMHG | WEIGHT: 165 LBS | TEMPERATURE: 98.5 F | BODY MASS INDEX: 28.17 KG/M2 | OXYGEN SATURATION: 97 % | RESPIRATION RATE: 20 BRPM

## 2023-05-25 DIAGNOSIS — F10.10 CHRONIC ALCOHOL ABUSE: ICD-10-CM

## 2023-05-25 DIAGNOSIS — F10.931 ALCOHOL WITHDRAWAL SYNDROME, WITH DELIRIUM (HCC): Primary | ICD-10-CM

## 2023-05-25 LAB
ALBUMIN SERPL-MCNC: 3.3 G/DL (ref 3.5–5)
ALBUMIN/GLOB SERPL: 1.1 (ref 0.4–1.6)
ALP SERPL-CCNC: 119 U/L (ref 50–130)
ALT SERPL-CCNC: 60 U/L (ref 12–65)
ANION GAP SERPL CALC-SCNC: 18 MMOL/L (ref 2–11)
APPEARANCE UR: CLEAR
AST SERPL-CCNC: 130 U/L (ref 15–37)
BACTERIA URNS QL MICRO: NEGATIVE /HPF
BASOPHILS # BLD: 0.1 K/UL (ref 0–0.2)
BASOPHILS NFR BLD: 1 % (ref 0–2)
BILIRUB SERPL-MCNC: 0.4 MG/DL (ref 0.2–1.1)
BILIRUB UR QL: NEGATIVE
BUN SERPL-MCNC: 7 MG/DL (ref 6–23)
CALCIUM SERPL-MCNC: 8.4 MG/DL (ref 8.3–10.4)
CASTS URNS QL MICRO: ABNORMAL /LPF
CHLORIDE SERPL-SCNC: 103 MMOL/L (ref 101–110)
CO2 SERPL-SCNC: 21 MMOL/L (ref 21–32)
COLOR UR: ABNORMAL
CREAT SERPL-MCNC: 0.44 MG/DL (ref 0.6–1)
DIFFERENTIAL METHOD BLD: ABNORMAL
EOSINOPHIL # BLD: 0.1 K/UL (ref 0–0.8)
EOSINOPHIL NFR BLD: 1 % (ref 0.5–7.8)
EPI CELLS #/AREA URNS HPF: ABNORMAL /HPF
ERYTHROCYTE [DISTWIDTH] IN BLOOD BY AUTOMATED COUNT: 24.9 % (ref 11.9–14.6)
ETHANOL SERPL-MCNC: 92 MG/DL (ref 0–0.08)
GLOBULIN SER CALC-MCNC: 3.1 G/DL (ref 2.8–4.5)
GLUCOSE BLD STRIP.AUTO-MCNC: 70 MG/DL (ref 65–100)
GLUCOSE SERPL-MCNC: 62 MG/DL (ref 65–100)
GLUCOSE UR STRIP.AUTO-MCNC: NEGATIVE MG/DL
HCT VFR BLD AUTO: 32.2 % (ref 35.8–46.3)
HGB BLD-MCNC: 10 G/DL (ref 11.7–15.4)
HGB UR QL STRIP: NEGATIVE
IMM GRANULOCYTES # BLD AUTO: 0 K/UL (ref 0–0.5)
IMM GRANULOCYTES NFR BLD AUTO: 0 % (ref 0–5)
KETONES UR QL STRIP.AUTO: >80 MG/DL
LEUKOCYTE ESTERASE UR QL STRIP.AUTO: ABNORMAL
LYMPHOCYTES # BLD: 1.1 K/UL (ref 0.5–4.6)
LYMPHOCYTES NFR BLD: 15 % (ref 13–44)
MAGNESIUM SERPL-MCNC: 1.8 MG/DL (ref 1.8–2.4)
MCH RBC QN AUTO: 23.9 PG (ref 26.1–32.9)
MCHC RBC AUTO-ENTMCNC: 31.1 G/DL (ref 31.4–35)
MCV RBC AUTO: 76.8 FL (ref 82–102)
MONOCYTES # BLD: 0.4 K/UL (ref 0.1–1.3)
MONOCYTES NFR BLD: 5 % (ref 4–12)
MUCOUS THREADS URNS QL MICRO: 0 /LPF
NEUTS SEG # BLD: 5.6 K/UL (ref 1.7–8.2)
NEUTS SEG NFR BLD: 78 % (ref 43–78)
NITRITE UR QL STRIP.AUTO: NEGATIVE
NRBC # BLD: 0 K/UL (ref 0–0.2)
PH UR STRIP: 6.5 (ref 5–9)
PLATELET # BLD AUTO: 224 K/UL (ref 150–450)
PMV BLD AUTO: 9.1 FL (ref 9.4–12.3)
POTASSIUM SERPL-SCNC: 3.9 MMOL/L (ref 3.5–5.1)
PROT SERPL-MCNC: 6.4 G/DL (ref 6.3–8.2)
PROT UR STRIP-MCNC: ABNORMAL MG/DL
RBC # BLD AUTO: 4.19 M/UL (ref 4.05–5.2)
RBC #/AREA URNS HPF: ABNORMAL /HPF
SERVICE CMNT-IMP: NORMAL
SODIUM SERPL-SCNC: 142 MMOL/L (ref 133–143)
SP GR UR REFRACTOMETRY: 1.02 (ref 1–1.02)
TROPONIN I SERPL HS-MCNC: 15 PG/ML (ref 0–14)
TROPONIN I SERPL HS-MCNC: 15.8 PG/ML (ref 0–14)
URINE CULTURE IF INDICATED: ABNORMAL
UROBILINOGEN UR QL STRIP.AUTO: 1 EU/DL (ref 0.2–1)
WBC # BLD AUTO: 7.2 K/UL (ref 4.3–11.1)
WBC URNS QL MICRO: ABNORMAL /HPF

## 2023-05-25 PROCEDURE — 6360000002 HC RX W HCPCS: Performed by: PHYSICIAN ASSISTANT

## 2023-05-25 PROCEDURE — 85025 COMPLETE CBC W/AUTO DIFF WBC: CPT

## 2023-05-25 PROCEDURE — 99284 EMERGENCY DEPT VISIT MOD MDM: CPT

## 2023-05-25 PROCEDURE — 82077 ASSAY SPEC XCP UR&BREATH IA: CPT

## 2023-05-25 PROCEDURE — 82962 GLUCOSE BLOOD TEST: CPT

## 2023-05-25 PROCEDURE — 96375 TX/PRO/DX INJ NEW DRUG ADDON: CPT

## 2023-05-25 PROCEDURE — 87184 SC STD DISK METHOD PER PLATE: CPT

## 2023-05-25 PROCEDURE — 87088 URINE BACTERIA CULTURE: CPT

## 2023-05-25 PROCEDURE — 83735 ASSAY OF MAGNESIUM: CPT

## 2023-05-25 PROCEDURE — 6370000000 HC RX 637 (ALT 250 FOR IP): Performed by: PHYSICIAN ASSISTANT

## 2023-05-25 PROCEDURE — 96374 THER/PROPH/DIAG INJ IV PUSH: CPT

## 2023-05-25 PROCEDURE — 87186 SC STD MICRODIL/AGAR DIL: CPT

## 2023-05-25 PROCEDURE — 96361 HYDRATE IV INFUSION ADD-ON: CPT

## 2023-05-25 PROCEDURE — 81001 URINALYSIS AUTO W/SCOPE: CPT

## 2023-05-25 PROCEDURE — 84484 ASSAY OF TROPONIN QUANT: CPT

## 2023-05-25 PROCEDURE — 2580000003 HC RX 258: Performed by: PHYSICIAN ASSISTANT

## 2023-05-25 PROCEDURE — 87086 URINE CULTURE/COLONY COUNT: CPT

## 2023-05-25 PROCEDURE — 80053 COMPREHEN METABOLIC PANEL: CPT

## 2023-05-25 RX ORDER — CEPHALEXIN 500 MG/1
500 CAPSULE ORAL 2 TIMES DAILY
Qty: 14 CAPSULE | Refills: 0 | Status: SHIPPED | OUTPATIENT
Start: 2023-05-25 | End: 2023-06-01

## 2023-05-25 RX ORDER — 0.9 % SODIUM CHLORIDE 0.9 %
1000 INTRAVENOUS SOLUTION INTRAVENOUS
Status: COMPLETED | OUTPATIENT
Start: 2023-05-25 | End: 2023-05-25

## 2023-05-25 RX ORDER — LORAZEPAM 2 MG/ML
1 INJECTION INTRAMUSCULAR ONCE
Status: COMPLETED | OUTPATIENT
Start: 2023-05-25 | End: 2023-05-25

## 2023-05-25 RX ORDER — CEPHALEXIN 500 MG/1
500 CAPSULE ORAL
Status: COMPLETED | OUTPATIENT
Start: 2023-05-25 | End: 2023-05-25

## 2023-05-25 RX ORDER — ONDANSETRON 2 MG/ML
4 INJECTION INTRAMUSCULAR; INTRAVENOUS
Status: COMPLETED | OUTPATIENT
Start: 2023-05-25 | End: 2023-05-25

## 2023-05-25 RX ADMIN — SODIUM CHLORIDE 1000 ML: 9 INJECTION, SOLUTION INTRAVENOUS at 11:06

## 2023-05-25 RX ADMIN — ONDANSETRON 4 MG: 2 INJECTION INTRAMUSCULAR; INTRAVENOUS at 11:07

## 2023-05-25 RX ADMIN — CEPHALEXIN 500 MG: 500 CAPSULE ORAL at 12:32

## 2023-05-25 RX ADMIN — LORAZEPAM 1 MG: 2 INJECTION INTRAMUSCULAR; INTRAVENOUS at 11:07

## 2023-05-25 ASSESSMENT — ENCOUNTER SYMPTOMS
NAUSEA: 1
ABDOMINAL PAIN: 0

## 2023-05-25 ASSESSMENT — PAIN SCALES - GENERAL: PAINLEVEL_OUTOF10: 0

## 2023-05-25 ASSESSMENT — PAIN - FUNCTIONAL ASSESSMENT: PAIN_FUNCTIONAL_ASSESSMENT: 0-10

## 2023-05-25 ASSESSMENT — LIFESTYLE VARIABLES
HOW MANY STANDARD DRINKS CONTAINING ALCOHOL DO YOU HAVE ON A TYPICAL DAY: 10 OR MORE
HOW OFTEN DO YOU HAVE A DRINK CONTAINING ALCOHOL: 4 OR MORE TIMES A WEEK

## 2023-05-25 NOTE — ED TRIAGE NOTES
Pt in via EMS from [facility] with c/o intoxication and withdrawals with headache and nausea. EMS reports pt states she usually drinks several pints of vodka a day with a blood alcohol level at .18 according to paperwork from [facility]. Pt unable to verbalize when her last drink was. BGL 80 per EMS. EMS reports she is A&O, but refusing to answer what year it is.

## 2023-05-25 NOTE — ED NOTES
I have reviewed discharge instructions with the patient. The patient verbalized understanding. Patient left ED via Discharge Method: stretcher to [facility] with [facility] for questions and clarification provided. Patient given 1 script. To continue your aftercare when you leave the hospital, you may receive an automated call from our care team to check in on how you are doing. This is a free service and part of our promise to provide the best care and service to meet your aftercare needs.  If you have questions, or wish to unsubscribe from this service please call [phone]. Thank you for Choosing our [facility] Emergency Department. Michelle Sanchez, Pending sale to [facility]  05/25/23 3583

## 2023-05-25 NOTE — ED NOTES
[facility] here at this time to transport pt back to [facility]. Warrick Ales Pryor Boast, [facility]  05/25/23 8717

## 2023-05-25 NOTE — ED PROVIDER NOTES
taken any antibiotics, I have given her a dose here and given a new prescription to take with her to her facility. I discussed patient with my attending, Dr. Marycruz Pinto, we will plan to have patient transferred back to [facility] for detoxification treatment. Risk of Complications and/or Morbidity of Patient Management:  Prescription drug management performed. Patient was discharged risks and benefits of hospitalization were considered. Shared medical decision making was utilized in creating the patients health plan today. ED Course as of 05/25/23 1229   Thu May 25, 2023   1012 EKG: Normal sinus rhythm at 61 bpm borderline intraventricular conduction delay. Low voltage. Nonspecific T wave abnormality. No STEMI [KE]   1052 EKG at 950: Sinus tachycardia at 106 bpm rightward axis deviation. Nonspecific T wave abnormality. No STEMI. [KE]   1100 RN notified of glucose, patient provided with food. [LC]   2167 Reevaluated patient, she is now alert and oriented X 4. she feels better after Ativan and IV fluids. No current tremor. Denies chest pain [KE]   1221 I discussed patient with Dr. Lola Perkins      ED Course User Index  [KE] Heide Gabrielle, PA        History      Nancy Yuen is a 46 y.o. female who presents to the Emergency Department with chief complaint of    Chief Complaint   Patient presents with    Alcohol Intoxication      70-year-old female with history of chronic heavy alcohol abuse presents to the emergency department today for evaluation of acute alcohol intoxication with delirium and concern for acute withdrawal per [facility] staff. Patient has been seen on numerous occasions in the past 3 weeks and various emergency departments for similar symptoms. She was actually seen last evening at 8 PM at [facility] for acute alcohol intoxication. I reviewed previous chart, labs and imaging that were performed at that visit. She was discharged this morning at about 530 am to [facility].   She presents to our

## 2023-05-28 LAB
BACTERIA SPEC CULT: ABNORMAL
BACTERIA SPEC CULT: ABNORMAL
SERVICE CMNT-IMP: ABNORMAL

## 2023-05-29 LAB
BACTERIA SPEC CULT: ABNORMAL
BACTERIA SPEC CULT: ABNORMAL
SERVICE CMNT-IMP: ABNORMAL

## 2023-05-30 NOTE — PROGRESS NOTES
ED pharmacist reviewed recent results of urine culture. The patient was treated with cephalexin in the emergency department and received a prescription for cephalexin upon discharge. Based on culture results, the patient requires alternative antimicrobial therapy. Discussed case with Dr. Forest Schaffer. Called [facility] where the patient is currently staying. Spoke to charge nurse Pierre Bal regarding positive urine results. Recommended changing antibiotics to fosfomycin 3g x1 dose. RN stated she would relay the information to the provider in change of her care. Allergies as of 05/25/2023    (No Known Allergies)     Myrl Cockayne, PharmD.   Emergency Medicine Clinical Pharmacist

## 2023-07-22 ENCOUNTER — APPOINTMENT (OUTPATIENT)
Dept: CT IMAGING | Facility: HOSPITAL | Age: 53
DRG: 897 | End: 2023-07-22
Payer: MEDICAID

## 2023-07-22 ENCOUNTER — APPOINTMENT (OUTPATIENT)
Dept: GENERAL RADIOLOGY | Facility: HOSPITAL | Age: 53
DRG: 897 | End: 2023-07-22
Payer: MEDICAID

## 2023-07-22 ENCOUNTER — HOSPITAL ENCOUNTER (INPATIENT)
Facility: HOSPITAL | Age: 53
LOS: 2 days | Discharge: PSYCHIATRIC OR SUBSTANCE USE FACILITY | DRG: 897 | End: 2023-07-24
Attending: EMERGENCY MEDICINE | Admitting: HOSPITALIST
Payer: MEDICAID

## 2023-07-22 DIAGNOSIS — F10.121 ALCOHOL INTOXICATION DELIRIUM: Primary | ICD-10-CM

## 2023-07-22 LAB
A-A DO2: 17.8 MMHG (ref 0–300)
A-A DO2: 25.8 MMHG (ref 0–300)
A-A DO2: 28.6 MMHG (ref 0–300)
ACETONE BLD QL: ABNORMAL
ALBUMIN SERPL-MCNC: 3.5 G/DL (ref 3.5–5.2)
ALBUMIN SERPL-MCNC: 4.5 G/DL (ref 3.5–5.2)
ALBUMIN/GLOB SERPL: 1.3 G/DL
ALBUMIN/GLOB SERPL: 1.9 G/DL
ALP SERPL-CCNC: 104 U/L (ref 39–117)
ALP SERPL-CCNC: 136 U/L (ref 39–117)
ALT SERPL W P-5'-P-CCNC: 110 U/L (ref 1–33)
ALT SERPL W P-5'-P-CCNC: 88 U/L (ref 1–33)
AMPHET+METHAMPHET UR QL: NEGATIVE
AMPHETAMINES UR QL: NEGATIVE
ANION GAP SERPL CALCULATED.3IONS-SCNC: 18.8 MMOL/L (ref 5–15)
ANION GAP SERPL CALCULATED.3IONS-SCNC: 21.9 MMOL/L (ref 5–15)
ANION GAP SERPL CALCULATED.3IONS-SCNC: 28.3 MMOL/L (ref 5–15)
ANISOCYTOSIS BLD QL: NORMAL
APAP SERPL-MCNC: <5 MCG/ML (ref 0–30)
ARTERIAL PATENCY WRIST A: ABNORMAL
ARTERIAL PATENCY WRIST A: POSITIVE
ARTERIAL PATENCY WRIST A: POSITIVE
AST SERPL-CCNC: 201 U/L (ref 1–32)
AST SERPL-CCNC: 267 U/L (ref 1–32)
ATMOSPHERIC PRESS: 724 MMHG
ATMOSPHERIC PRESS: 725 MMHG
ATMOSPHERIC PRESS: 725 MMHG
BACTERIA UR QL AUTO: ABNORMAL /HPF
BARBITURATES UR QL SCN: POSITIVE
BASE EXCESS BLDA CALC-SCNC: -13.2 MMOL/L (ref 0–2)
BASE EXCESS BLDA CALC-SCNC: -3 MMOL/L (ref 0–2)
BASE EXCESS BLDA CALC-SCNC: -9.8 MMOL/L (ref 0–2)
BASOPHILS # BLD AUTO: 0.04 10*3/MM3 (ref 0–0.2)
BASOPHILS # BLD AUTO: 0.1 10*3/MM3 (ref 0–0.2)
BASOPHILS NFR BLD AUTO: 0.5 % (ref 0–1.5)
BASOPHILS NFR BLD AUTO: 1.3 % (ref 0–1.5)
BDY SITE: ABNORMAL
BENZODIAZ UR QL SCN: POSITIVE
BILIRUB SERPL-MCNC: 0.2 MG/DL (ref 0–1.2)
BILIRUB SERPL-MCNC: 0.3 MG/DL (ref 0–1.2)
BILIRUB UR QL STRIP: NEGATIVE
BUN SERPL-MCNC: 15 MG/DL (ref 6–20)
BUN SERPL-MCNC: 7 MG/DL (ref 6–20)
BUN SERPL-MCNC: 9 MG/DL (ref 6–20)
BUN/CREAT SERPL: 13.5 (ref 7–25)
BUN/CREAT SERPL: 18.5 (ref 7–25)
BUN/CREAT SERPL: 18.8 (ref 7–25)
BUPRENORPHINE SERPL-MCNC: NEGATIVE NG/ML
CALCIUM SPEC-SCNC: 7.1 MG/DL (ref 8.6–10.5)
CALCIUM SPEC-SCNC: 7.4 MG/DL (ref 8.6–10.5)
CALCIUM SPEC-SCNC: 9 MG/DL (ref 8.6–10.5)
CANNABINOIDS SERPL QL: NEGATIVE
CHLORIDE SERPL-SCNC: 97 MMOL/L (ref 98–107)
CHLORIDE SERPL-SCNC: 97 MMOL/L (ref 98–107)
CHLORIDE SERPL-SCNC: 98 MMOL/L (ref 98–107)
CLARITY UR: CLEAR
CO2 BLDA-SCNC: 14.6 MMOL/L (ref 22–33)
CO2 BLDA-SCNC: 17 MMOL/L (ref 22–33)
CO2 BLDA-SCNC: 22.3 MMOL/L (ref 22–33)
CO2 SERPL-SCNC: 12.7 MMOL/L (ref 22–29)
CO2 SERPL-SCNC: 18.1 MMOL/L (ref 22–29)
CO2 SERPL-SCNC: 19.2 MMOL/L (ref 22–29)
COARSE GRAN CASTS URNS QL MICRO: ABNORMAL /LPF
COCAINE UR QL: NEGATIVE
COHGB MFR BLD: 1.5 % (ref 0–5)
COHGB MFR BLD: 1.5 % (ref 0–5)
COHGB MFR BLD: 1.8 % (ref 0–5)
COLOR UR: YELLOW
CREAT SERPL-MCNC: 0.48 MG/DL (ref 0.57–1)
CREAT SERPL-MCNC: 0.52 MG/DL (ref 0.57–1)
CREAT SERPL-MCNC: 0.81 MG/DL (ref 0.57–1)
D-LACTATE SERPL-SCNC: 1.1 MMOL/L (ref 0.5–2)
D-LACTATE SERPL-SCNC: 3.4 MMOL/L (ref 0.5–2)
D-LACTATE SERPL-SCNC: 3.6 MMOL/L (ref 0.5–2)
D-LACTATE SERPL-SCNC: 3.9 MMOL/L (ref 0.5–2)
D-LACTATE SERPL-SCNC: 3.9 MMOL/L (ref 0.5–2)
DEPRECATED RDW RBC AUTO: 61.3 FL (ref 37–54)
DEPRECATED RDW RBC AUTO: 66 FL (ref 37–54)
EGFRCR SERPLBLD CKD-EPI 2021: 111.9 ML/MIN/1.73
EGFRCR SERPLBLD CKD-EPI 2021: 114.1 ML/MIN/1.73
EGFRCR SERPLBLD CKD-EPI 2021: 87.5 ML/MIN/1.73
EOSINOPHIL # BLD AUTO: 0.01 10*3/MM3 (ref 0–0.4)
EOSINOPHIL # BLD AUTO: 0.01 10*3/MM3 (ref 0–0.4)
EOSINOPHIL NFR BLD AUTO: 0.1 % (ref 0.3–6.2)
EOSINOPHIL NFR BLD AUTO: 0.1 % (ref 0.3–6.2)
ERYTHROCYTE [DISTWIDTH] IN BLOOD BY AUTOMATED COUNT: 22.2 % (ref 12.3–15.4)
ERYTHROCYTE [DISTWIDTH] IN BLOOD BY AUTOMATED COUNT: 23.1 % (ref 12.3–15.4)
ETHANOL BLD-MCNC: 274 MG/DL (ref 0–10)
ETHANOL BLD-MCNC: 397 MG/DL (ref 0–10)
ETHANOL UR QL: 0.27 %
ETHANOL UR QL: 0.4 %
FENTANYL UR-MCNC: NEGATIVE NG/ML
FLUAV RNA RESP QL NAA+PROBE: NOT DETECTED
FLUBV RNA RESP QL NAA+PROBE: NOT DETECTED
GLOBULIN UR ELPH-MCNC: 1.8 GM/DL
GLOBULIN UR ELPH-MCNC: 3.4 GM/DL
GLUCOSE BLDC GLUCOMTR-MCNC: 106 MG/DL (ref 70–130)
GLUCOSE BLDC GLUCOMTR-MCNC: 160 MG/DL (ref 70–130)
GLUCOSE BLDC GLUCOMTR-MCNC: 164 MG/DL (ref 70–130)
GLUCOSE BLDC GLUCOMTR-MCNC: 174 MG/DL (ref 70–130)
GLUCOSE BLDC GLUCOMTR-MCNC: 180 MG/DL (ref 70–130)
GLUCOSE BLDC GLUCOMTR-MCNC: 242 MG/DL (ref 70–130)
GLUCOSE BLDC GLUCOMTR-MCNC: 48 MG/DL (ref 70–130)
GLUCOSE BLDC GLUCOMTR-MCNC: 59 MG/DL (ref 70–130)
GLUCOSE BLDC GLUCOMTR-MCNC: 61 MG/DL (ref 70–130)
GLUCOSE BLDC GLUCOMTR-MCNC: 67 MG/DL (ref 70–130)
GLUCOSE BLDC GLUCOMTR-MCNC: 75 MG/DL (ref 70–130)
GLUCOSE BLDC GLUCOMTR-MCNC: 79 MG/DL (ref 70–130)
GLUCOSE BLDC GLUCOMTR-MCNC: 97 MG/DL (ref 70–130)
GLUCOSE SERPL-MCNC: 62 MG/DL (ref 65–99)
GLUCOSE SERPL-MCNC: 69 MG/DL (ref 65–99)
GLUCOSE SERPL-MCNC: 82 MG/DL (ref 65–99)
GLUCOSE UR STRIP-MCNC: ABNORMAL MG/DL
HAV IGM SERPL QL IA: NORMAL
HBV CORE IGM SERPL QL IA: NORMAL
HBV SURFACE AG SERPL QL IA: NORMAL
HCG SERPL QL: NEGATIVE
HCO3 BLDA-SCNC: 13.6 MMOL/L (ref 20–26)
HCO3 BLDA-SCNC: 16 MMOL/L (ref 20–26)
HCO3 BLDA-SCNC: 21.3 MMOL/L (ref 20–26)
HCT VFR BLD AUTO: 26.3 % (ref 34–46.6)
HCT VFR BLD AUTO: 36.1 % (ref 34–46.6)
HCT VFR BLD CALC: 26.1 % (ref 38–51)
HCT VFR BLD CALC: 28.9 % (ref 38–51)
HCT VFR BLD CALC: 31.2 % (ref 38–51)
HCV AB SER DONR QL: NORMAL
HGB BLD-MCNC: 11 G/DL (ref 12–15.9)
HGB BLD-MCNC: 8.4 G/DL (ref 12–15.9)
HGB BLDA-MCNC: 10.2 G/DL (ref 13.5–17.5)
HGB BLDA-MCNC: 8.5 G/DL (ref 13.5–17.5)
HGB BLDA-MCNC: 9.4 G/DL (ref 13.5–17.5)
HGB UR QL STRIP.AUTO: NEGATIVE
HOLD SPECIMEN: NORMAL
HYALINE CASTS UR QL AUTO: ABNORMAL /LPF
HYPOCHROMIA BLD QL: NORMAL
HYPOCHROMIA BLD QL: NORMAL
IMM GRANULOCYTES # BLD AUTO: 0.02 10*3/MM3 (ref 0–0.05)
IMM GRANULOCYTES # BLD AUTO: 0.03 10*3/MM3 (ref 0–0.05)
IMM GRANULOCYTES NFR BLD AUTO: 0.3 % (ref 0–0.5)
IMM GRANULOCYTES NFR BLD AUTO: 0.4 % (ref 0–0.5)
INHALED O2 CONCENTRATION: 21 %
INR PPP: 0.93 (ref 0.9–1.1)
KETONES UR QL STRIP: ABNORMAL
LARGE PLATELETS: NORMAL
LEUKOCYTE ESTERASE UR QL STRIP.AUTO: NEGATIVE
LIPASE SERPL-CCNC: 24 U/L (ref 13–60)
LYMPHOCYTES # BLD AUTO: 1.21 10*3/MM3 (ref 0.7–3.1)
LYMPHOCYTES # BLD AUTO: 1.58 10*3/MM3 (ref 0.7–3.1)
LYMPHOCYTES NFR BLD AUTO: 15.6 % (ref 19.6–45.3)
LYMPHOCYTES NFR BLD AUTO: 19.9 % (ref 19.6–45.3)
Lab: ABNORMAL
MAGNESIUM SERPL-MCNC: 1.6 MG/DL (ref 1.6–2.6)
MAGNESIUM SERPL-MCNC: 2.3 MG/DL (ref 1.6–2.6)
MCH RBC QN AUTO: 24.4 PG (ref 26.6–33)
MCH RBC QN AUTO: 24.7 PG (ref 26.6–33)
MCHC RBC AUTO-ENTMCNC: 30.5 G/DL (ref 31.5–35.7)
MCHC RBC AUTO-ENTMCNC: 31.9 G/DL (ref 31.5–35.7)
MCV RBC AUTO: 77.4 FL (ref 79–97)
MCV RBC AUTO: 80.2 FL (ref 79–97)
METHADONE UR QL SCN: NEGATIVE
METHGB BLD QL: 0.3 % (ref 0–3)
METHGB BLD QL: 0.7 % (ref 0–3)
METHGB BLD QL: 0.8 % (ref 0–3)
MICROCYTES BLD QL: NORMAL
MODALITY: ABNORMAL
MONOCYTES # BLD AUTO: 0.73 10*3/MM3 (ref 0.1–0.9)
MONOCYTES # BLD AUTO: 1.12 10*3/MM3 (ref 0.1–0.9)
MONOCYTES NFR BLD AUTO: 14.1 % (ref 5–12)
MONOCYTES NFR BLD AUTO: 9.4 % (ref 5–12)
NEUTROPHILS NFR BLD AUTO: 5.14 10*3/MM3 (ref 1.7–7)
NEUTROPHILS NFR BLD AUTO: 5.71 10*3/MM3 (ref 1.7–7)
NEUTROPHILS NFR BLD AUTO: 65 % (ref 42.7–76)
NEUTROPHILS NFR BLD AUTO: 73.3 % (ref 42.7–76)
NITRITE UR QL STRIP: NEGATIVE
NOTE: ABNORMAL
NOTIFIED BY: ABNORMAL
NOTIFIED WHO: ABNORMAL
NOTIFIED WHO: ABNORMAL
NRBC BLD AUTO-RTO: 0 /100 WBC (ref 0–0.2)
NRBC BLD AUTO-RTO: 0 /100 WBC (ref 0–0.2)
OPIATES UR QL: NEGATIVE
OXYCODONE UR QL SCN: NEGATIVE
OXYHGB MFR BLDV: 88.4 % (ref 94–99)
OXYHGB MFR BLDV: 92.6 % (ref 94–99)
OXYHGB MFR BLDV: 93 % (ref 94–99)
PCO2 BLDA: 33.7 MM HG (ref 35–45)
PCO2 BLDA: 34 MM HG (ref 35–45)
PCO2 BLDA: 34 MM HG (ref 35–45)
PCO2 TEMP ADJ BLD: ABNORMAL MM[HG]
PCP UR QL SCN: NEGATIVE
PH BLDA: 7.21 PH UNITS (ref 7.35–7.45)
PH BLDA: 7.29 PH UNITS (ref 7.35–7.45)
PH BLDA: 7.4 PH UNITS (ref 7.35–7.45)
PH UR STRIP.AUTO: 5.5 [PH] (ref 5–8)
PH, TEMP CORRECTED: ABNORMAL
PLAT MORPH BLD: NORMAL
PLATELET # BLD AUTO: 316 10*3/MM3 (ref 140–450)
PLATELET # BLD AUTO: 453 10*3/MM3 (ref 140–450)
PMV BLD AUTO: 8.8 FL (ref 6–12)
PMV BLD AUTO: 9.1 FL (ref 6–12)
PO2 BLDA: 75.5 MM HG (ref 83–108)
PO2 BLDA: 78.1 MM HG (ref 83–108)
PO2 BLDA: 86.5 MM HG (ref 83–108)
PO2 TEMP ADJ BLD: ABNORMAL MM[HG]
POTASSIUM SERPL-SCNC: 3.3 MMOL/L (ref 3.5–5.2)
POTASSIUM SERPL-SCNC: 3.7 MMOL/L (ref 3.5–5.2)
POTASSIUM SERPL-SCNC: 4.3 MMOL/L (ref 3.5–5.2)
PROPOXYPH UR QL: NEGATIVE
PROT SERPL-MCNC: 5.3 G/DL (ref 6–8.5)
PROT SERPL-MCNC: 7.9 G/DL (ref 6–8.5)
PROT UR QL STRIP: ABNORMAL
PROTHROMBIN TIME: 12.9 SECONDS (ref 12.1–14.7)
RBC # BLD AUTO: 3.4 10*6/MM3 (ref 3.77–5.28)
RBC # BLD AUTO: 4.5 10*6/MM3 (ref 3.77–5.28)
RBC # UR STRIP: ABNORMAL /HPF
REF LAB TEST METHOD: ABNORMAL
SALICYLATES SERPL-MCNC: <0.3 MG/DL
SAO2 % BLDCOA: 90.4 % (ref 94–99)
SAO2 % BLDCOA: 94.7 % (ref 94–99)
SAO2 % BLDCOA: 95.1 % (ref 94–99)
SARS-COV-2 RNA RESP QL NAA+PROBE: NOT DETECTED
SODIUM SERPL-SCNC: 135 MMOL/L (ref 136–145)
SODIUM SERPL-SCNC: 137 MMOL/L (ref 136–145)
SODIUM SERPL-SCNC: 139 MMOL/L (ref 136–145)
SP GR UR STRIP: 1.02 (ref 1–1.03)
SQUAMOUS #/AREA URNS HPF: ABNORMAL /HPF
TARGETS BLD QL SMEAR: NORMAL
TRICYCLICS UR QL SCN: NEGATIVE
UROBILINOGEN UR QL STRIP: ABNORMAL
VENTILATOR MODE: ABNORMAL
WBC # UR STRIP: ABNORMAL /HPF
WBC NRBC COR # BLD: 7.78 10*3/MM3 (ref 3.4–10.8)
WBC NRBC COR # BLD: 7.92 10*3/MM3 (ref 3.4–10.8)
WHOLE BLOOD HOLD COAG: NORMAL
WHOLE BLOOD HOLD SPECIMEN: NORMAL

## 2023-07-22 PROCEDURE — 36600 WITHDRAWAL OF ARTERIAL BLOOD: CPT

## 2023-07-22 PROCEDURE — 83735 ASSAY OF MAGNESIUM: CPT | Performed by: HOSPITALIST

## 2023-07-22 PROCEDURE — 94761 N-INVAS EAR/PLS OXIMETRY MLT: CPT

## 2023-07-22 PROCEDURE — 80307 DRUG TEST PRSMV CHEM ANLYZR: CPT | Performed by: NURSE PRACTITIONER

## 2023-07-22 PROCEDURE — 71045 X-RAY EXAM CHEST 1 VIEW: CPT | Performed by: RADIOLOGY

## 2023-07-22 PROCEDURE — 99222 1ST HOSP IP/OBS MODERATE 55: CPT | Performed by: PSYCHIATRY & NEUROLOGY

## 2023-07-22 PROCEDURE — 82375 ASSAY CARBOXYHB QUANT: CPT

## 2023-07-22 PROCEDURE — 82077 ASSAY SPEC XCP UR&BREATH IA: CPT | Performed by: NURSE PRACTITIONER

## 2023-07-22 PROCEDURE — 85610 PROTHROMBIN TIME: CPT | Performed by: STUDENT IN AN ORGANIZED HEALTH CARE EDUCATION/TRAINING PROGRAM

## 2023-07-22 PROCEDURE — 80143 DRUG ASSAY ACETAMINOPHEN: CPT | Performed by: NURSE PRACTITIONER

## 2023-07-22 PROCEDURE — 80179 DRUG ASSAY SALICYLATE: CPT | Performed by: NURSE PRACTITIONER

## 2023-07-22 PROCEDURE — 93005 ELECTROCARDIOGRAM TRACING: CPT | Performed by: HOSPITALIST

## 2023-07-22 PROCEDURE — 36415 COLL VENOUS BLD VENIPUNCTURE: CPT

## 2023-07-22 PROCEDURE — 83050 HGB METHEMOGLOBIN QUAN: CPT

## 2023-07-22 PROCEDURE — 82805 BLOOD GASES W/O2 SATURATION: CPT

## 2023-07-22 PROCEDURE — 80074 ACUTE HEPATITIS PANEL: CPT | Performed by: NURSE PRACTITIONER

## 2023-07-22 PROCEDURE — P9612 CATHETERIZE FOR URINE SPEC: HCPCS

## 2023-07-22 PROCEDURE — 85025 COMPLETE CBC W/AUTO DIFF WBC: CPT | Performed by: HOSPITALIST

## 2023-07-22 PROCEDURE — 25010000002 THIAMINE PER 100 MG: Performed by: NURSE PRACTITIONER

## 2023-07-22 PROCEDURE — 70450 CT HEAD/BRAIN W/O DYE: CPT

## 2023-07-22 PROCEDURE — 87636 SARSCOV2 & INF A&B AMP PRB: CPT | Performed by: NURSE PRACTITIONER

## 2023-07-22 PROCEDURE — 82077 ASSAY SPEC XCP UR&BREATH IA: CPT | Performed by: EMERGENCY MEDICINE

## 2023-07-22 PROCEDURE — 85007 BL SMEAR W/DIFF WBC COUNT: CPT | Performed by: NURSE PRACTITIONER

## 2023-07-22 PROCEDURE — 0 MAGNESIUM SULFATE 4 GM/100ML SOLUTION: Performed by: HOSPITALIST

## 2023-07-22 PROCEDURE — 83690 ASSAY OF LIPASE: CPT | Performed by: HOSPITALIST

## 2023-07-22 PROCEDURE — 93010 ELECTROCARDIOGRAM REPORT: CPT | Performed by: SPECIALIST

## 2023-07-22 PROCEDURE — 71045 X-RAY EXAM CHEST 1 VIEW: CPT

## 2023-07-22 PROCEDURE — 82948 REAGENT STRIP/BLOOD GLUCOSE: CPT

## 2023-07-22 PROCEDURE — 80053 COMPREHEN METABOLIC PANEL: CPT | Performed by: HOSPITALIST

## 2023-07-22 PROCEDURE — 85025 COMPLETE CBC W/AUTO DIFF WBC: CPT | Performed by: NURSE PRACTITIONER

## 2023-07-22 PROCEDURE — 25010000002 LORAZEPAM PER 2 MG: Performed by: STUDENT IN AN ORGANIZED HEALTH CARE EDUCATION/TRAINING PROGRAM

## 2023-07-22 PROCEDURE — 84703 CHORIONIC GONADOTROPIN ASSAY: CPT | Performed by: NURSE PRACTITIONER

## 2023-07-22 PROCEDURE — 94799 UNLISTED PULMONARY SVC/PX: CPT

## 2023-07-22 PROCEDURE — 99285 EMERGENCY DEPT VISIT HI MDM: CPT

## 2023-07-22 PROCEDURE — 25010000002 ONDANSETRON PER 1 MG: Performed by: NURSE PRACTITIONER

## 2023-07-22 PROCEDURE — 83605 ASSAY OF LACTIC ACID: CPT | Performed by: EMERGENCY MEDICINE

## 2023-07-22 PROCEDURE — 25010000002 THIAMINE PER 100 MG: Performed by: STUDENT IN AN ORGANIZED HEALTH CARE EDUCATION/TRAINING PROGRAM

## 2023-07-22 PROCEDURE — 99223 1ST HOSP IP/OBS HIGH 75: CPT

## 2023-07-22 PROCEDURE — 70450 CT HEAD/BRAIN W/O DYE: CPT | Performed by: RADIOLOGY

## 2023-07-22 PROCEDURE — 83735 ASSAY OF MAGNESIUM: CPT | Performed by: NURSE PRACTITIONER

## 2023-07-22 PROCEDURE — 85007 BL SMEAR W/DIFF WBC COUNT: CPT | Performed by: HOSPITALIST

## 2023-07-22 PROCEDURE — 81001 URINALYSIS AUTO W/SCOPE: CPT | Performed by: NURSE PRACTITIONER

## 2023-07-22 PROCEDURE — 82009 KETONE BODYS QUAL: CPT | Performed by: NURSE PRACTITIONER

## 2023-07-22 RX ORDER — BISACODYL 5 MG/1
5 TABLET, DELAYED RELEASE ORAL DAILY PRN
Status: DISCONTINUED | OUTPATIENT
Start: 2023-07-22 | End: 2023-07-24 | Stop reason: HOSPADM

## 2023-07-22 RX ORDER — POLYETHYLENE GLYCOL 3350 17 G/17G
17 POWDER, FOR SOLUTION ORAL DAILY PRN
Status: DISCONTINUED | OUTPATIENT
Start: 2023-07-22 | End: 2023-07-24 | Stop reason: HOSPADM

## 2023-07-22 RX ORDER — LAMOTRIGINE 200 MG/1
200 TABLET ORAL DAILY
COMMUNITY
End: 2023-07-24 | Stop reason: HOSPADM

## 2023-07-22 RX ORDER — THIAMINE HYDROCHLORIDE 100 MG/ML
200 INJECTION, SOLUTION INTRAMUSCULAR; INTRAVENOUS ONCE
Status: COMPLETED | OUTPATIENT
Start: 2023-07-22 | End: 2023-07-22

## 2023-07-22 RX ORDER — LORAZEPAM 1 MG/1
1 TABLET ORAL
Status: DISCONTINUED | OUTPATIENT
Start: 2023-07-22 | End: 2023-07-24 | Stop reason: HOSPADM

## 2023-07-22 RX ORDER — LORAZEPAM 2 MG/1
4 TABLET ORAL
Status: DISCONTINUED | OUTPATIENT
Start: 2023-07-22 | End: 2023-07-24 | Stop reason: HOSPADM

## 2023-07-22 RX ORDER — SODIUM CHLORIDE 0.9 % (FLUSH) 0.9 %
10 SYRINGE (ML) INJECTION AS NEEDED
Status: DISCONTINUED | OUTPATIENT
Start: 2023-07-22 | End: 2023-07-24 | Stop reason: HOSPADM

## 2023-07-22 RX ORDER — DEXTROSE MONOHYDRATE 25 G/50ML
25 INJECTION, SOLUTION INTRAVENOUS ONCE
Status: COMPLETED | OUTPATIENT
Start: 2023-07-22 | End: 2023-07-22

## 2023-07-22 RX ORDER — TRAZODONE HYDROCHLORIDE 50 MG/1
50 TABLET ORAL NIGHTLY
Status: CANCELLED | OUTPATIENT
Start: 2023-07-22

## 2023-07-22 RX ORDER — PROPRANOLOL HYDROCHLORIDE 20 MG/1
20 TABLET ORAL 3 TIMES DAILY
Status: CANCELLED | OUTPATIENT
Start: 2023-07-22

## 2023-07-22 RX ORDER — SODIUM CHLORIDE 9 MG/ML
1000 INJECTION, SOLUTION INTRAVENOUS ONCE
Status: COMPLETED | OUTPATIENT
Start: 2023-07-22 | End: 2023-07-22

## 2023-07-22 RX ORDER — DEXTROSE MONOHYDRATE 50 MG/ML
75 INJECTION, SOLUTION INTRAVENOUS CONTINUOUS
Status: DISCONTINUED | OUTPATIENT
Start: 2023-07-22 | End: 2023-07-24

## 2023-07-22 RX ORDER — THIAMINE HYDROCHLORIDE 100 MG/ML
200 INJECTION, SOLUTION INTRAMUSCULAR; INTRAVENOUS EVERY 8 HOURS SCHEDULED
Status: DISCONTINUED | OUTPATIENT
Start: 2023-07-22 | End: 2023-07-24 | Stop reason: HOSPADM

## 2023-07-22 RX ORDER — LORAZEPAM 2 MG/ML
1 INJECTION INTRAMUSCULAR
Status: DISCONTINUED | OUTPATIENT
Start: 2023-07-22 | End: 2023-07-24 | Stop reason: HOSPADM

## 2023-07-22 RX ORDER — SODIUM CHLORIDE 9 MG/ML
100 INJECTION, SOLUTION INTRAVENOUS CONTINUOUS
Status: DISCONTINUED | OUTPATIENT
Start: 2023-07-22 | End: 2023-07-22

## 2023-07-22 RX ORDER — AMOXICILLIN 250 MG
2 CAPSULE ORAL 2 TIMES DAILY
Status: DISCONTINUED | OUTPATIENT
Start: 2023-07-22 | End: 2023-07-24 | Stop reason: HOSPADM

## 2023-07-22 RX ORDER — GABAPENTIN 300 MG/1
600 CAPSULE ORAL 2 TIMES DAILY
Status: CANCELLED | OUTPATIENT
Start: 2023-07-22

## 2023-07-22 RX ORDER — LORAZEPAM 2 MG/1
2 TABLET ORAL
Status: DISCONTINUED | OUTPATIENT
Start: 2023-07-22 | End: 2023-07-24 | Stop reason: HOSPADM

## 2023-07-22 RX ORDER — LORAZEPAM 2 MG/ML
2 INJECTION INTRAMUSCULAR
Status: DISCONTINUED | OUTPATIENT
Start: 2023-07-22 | End: 2023-07-24 | Stop reason: HOSPADM

## 2023-07-22 RX ORDER — FLUOXETINE HYDROCHLORIDE 40 MG/1
40 CAPSULE ORAL DAILY
COMMUNITY
End: 2023-07-24 | Stop reason: HOSPADM

## 2023-07-22 RX ORDER — METHION/INOS/CHOL BT/B COM/LIV 110MG-86MG
100 CAPSULE ORAL DAILY
Status: DISCONTINUED | OUTPATIENT
Start: 2023-07-27 | End: 2023-07-24 | Stop reason: HOSPADM

## 2023-07-22 RX ORDER — LURASIDONE HYDROCHLORIDE 80 MG/1
80 TABLET, FILM COATED ORAL DAILY
COMMUNITY
Start: 2023-07-01 | End: 2023-07-24 | Stop reason: HOSPADM

## 2023-07-22 RX ORDER — DEXTROSE MONOHYDRATE 25 G/50ML
INJECTION, SOLUTION INTRAVENOUS
Status: COMPLETED
Start: 2023-07-22 | End: 2023-07-22

## 2023-07-22 RX ORDER — NICOTINE POLACRILEX 4 MG
15 LOZENGE BUCCAL
Status: DISCONTINUED | OUTPATIENT
Start: 2023-07-22 | End: 2023-07-24 | Stop reason: HOSPADM

## 2023-07-22 RX ORDER — GABAPENTIN 300 MG/1
600 CAPSULE ORAL 2 TIMES DAILY
COMMUNITY
End: 2023-07-24 | Stop reason: HOSPADM

## 2023-07-22 RX ORDER — SODIUM CHLORIDE 9 MG/ML
40 INJECTION, SOLUTION INTRAVENOUS AS NEEDED
Status: DISCONTINUED | OUTPATIENT
Start: 2023-07-22 | End: 2023-07-24 | Stop reason: HOSPADM

## 2023-07-22 RX ORDER — TRAZODONE HYDROCHLORIDE 50 MG/1
50 TABLET ORAL NIGHTLY
COMMUNITY
End: 2023-07-24 | Stop reason: HOSPADM

## 2023-07-22 RX ORDER — BISACODYL 10 MG
10 SUPPOSITORY, RECTAL RECTAL DAILY PRN
Status: DISCONTINUED | OUTPATIENT
Start: 2023-07-22 | End: 2023-07-24 | Stop reason: HOSPADM

## 2023-07-22 RX ORDER — POTASSIUM CHLORIDE 20 MEQ/1
40 TABLET, EXTENDED RELEASE ORAL EVERY 4 HOURS
Status: COMPLETED | OUTPATIENT
Start: 2023-07-22 | End: 2023-07-23

## 2023-07-22 RX ORDER — LURASIDONE HYDROCHLORIDE 40 MG/1
80 TABLET, FILM COATED ORAL DAILY
Status: CANCELLED | OUTPATIENT
Start: 2023-07-22

## 2023-07-22 RX ORDER — PROPRANOLOL HYDROCHLORIDE 20 MG/1
20 TABLET ORAL 3 TIMES DAILY
COMMUNITY
End: 2023-07-24 | Stop reason: HOSPADM

## 2023-07-22 RX ORDER — DEXTROSE MONOHYDRATE 25 G/50ML
25 INJECTION, SOLUTION INTRAVENOUS
Status: DISCONTINUED | OUTPATIENT
Start: 2023-07-22 | End: 2023-07-24 | Stop reason: HOSPADM

## 2023-07-22 RX ORDER — LAMOTRIGINE 100 MG/1
200 TABLET ORAL DAILY
Status: CANCELLED | OUTPATIENT
Start: 2023-07-22

## 2023-07-22 RX ORDER — LORAZEPAM 2 MG/ML
4 INJECTION INTRAMUSCULAR
Status: DISCONTINUED | OUTPATIENT
Start: 2023-07-22 | End: 2023-07-24 | Stop reason: HOSPADM

## 2023-07-22 RX ORDER — FLUOXETINE HYDROCHLORIDE 20 MG/1
40 CAPSULE ORAL DAILY
Status: DISCONTINUED | OUTPATIENT
Start: 2023-07-22 | End: 2023-07-24 | Stop reason: HOSPADM

## 2023-07-22 RX ORDER — MAGNESIUM SULFATE HEPTAHYDRATE 40 MG/ML
4 INJECTION, SOLUTION INTRAVENOUS ONCE
Status: COMPLETED | OUTPATIENT
Start: 2023-07-22 | End: 2023-07-23

## 2023-07-22 RX ORDER — SODIUM CHLORIDE 0.9 % (FLUSH) 0.9 %
10 SYRINGE (ML) INJECTION EVERY 12 HOURS SCHEDULED
Status: DISCONTINUED | OUTPATIENT
Start: 2023-07-22 | End: 2023-07-24 | Stop reason: HOSPADM

## 2023-07-22 RX ORDER — NITROGLYCERIN 0.4 MG/1
0.4 TABLET SUBLINGUAL
Status: DISCONTINUED | OUTPATIENT
Start: 2023-07-22 | End: 2023-07-24 | Stop reason: HOSPADM

## 2023-07-22 RX ORDER — ONDANSETRON 2 MG/ML
4 INJECTION INTRAMUSCULAR; INTRAVENOUS ONCE
Status: COMPLETED | OUTPATIENT
Start: 2023-07-22 | End: 2023-07-22

## 2023-07-22 RX ADMIN — POTASSIUM CHLORIDE 40 MEQ: 1500 TABLET, EXTENDED RELEASE ORAL at 20:55

## 2023-07-22 RX ADMIN — Medication 10 ML: at 20:55

## 2023-07-22 RX ADMIN — SODIUM CHLORIDE 100 ML/HR: 9 INJECTION, SOLUTION INTRAVENOUS at 09:39

## 2023-07-22 RX ADMIN — SODIUM BICARBONATE 50 MEQ: 84 INJECTION INTRAVENOUS at 06:12

## 2023-07-22 RX ADMIN — DEXTROSE MONOHYDRATE 25 G: 25 INJECTION, SOLUTION INTRAVENOUS at 06:22

## 2023-07-22 RX ADMIN — THIAMINE HYDROCHLORIDE 200 MG: 100 INJECTION, SOLUTION INTRAMUSCULAR; INTRAVENOUS at 11:39

## 2023-07-22 RX ADMIN — SODIUM BICARBONATE 50 MEQ: 84 INJECTION INTRAVENOUS at 01:25

## 2023-07-22 RX ADMIN — DEXTROSE MONOHYDRATE 50 ML: 25 INJECTION, SOLUTION INTRAVENOUS at 10:45

## 2023-07-22 RX ADMIN — FOLIC ACID 1 MG: 5 INJECTION, SOLUTION INTRAMUSCULAR; INTRAVENOUS; SUBCUTANEOUS at 00:59

## 2023-07-22 RX ADMIN — SODIUM CHLORIDE 1000 ML: 9 INJECTION, SOLUTION INTRAVENOUS at 01:25

## 2023-07-22 RX ADMIN — THIAMINE HYDROCHLORIDE 200 MG: 100 INJECTION, SOLUTION INTRAMUSCULAR; INTRAVENOUS at 00:51

## 2023-07-22 RX ADMIN — THIAMINE HYDROCHLORIDE 200 MG: 100 INJECTION, SOLUTION INTRAMUSCULAR; INTRAVENOUS at 21:00

## 2023-07-22 RX ADMIN — LORAZEPAM 2 MG: 2 INJECTION INTRAMUSCULAR; INTRAVENOUS at 12:09

## 2023-07-22 RX ADMIN — DEXTROSE MONOHYDRATE 25 G: 25 INJECTION, SOLUTION INTRAVENOUS at 13:24

## 2023-07-22 RX ADMIN — SODIUM BICARBONATE 50 MEQ: 84 INJECTION INTRAVENOUS at 06:11

## 2023-07-22 RX ADMIN — DEXTROSE MONOHYDRATE 75 ML/HR: 50 INJECTION, SOLUTION INTRAVENOUS at 11:42

## 2023-07-22 RX ADMIN — DOCUSATE SODIUM 50 MG AND SENNOSIDES 8.6 MG 2 TABLET: 8.6; 5 TABLET, FILM COATED ORAL at 20:55

## 2023-07-22 RX ADMIN — DEXTROSE MONOHYDRATE 25 G: 25 INJECTION, SOLUTION INTRAVENOUS at 00:29

## 2023-07-22 RX ADMIN — ONDANSETRON 4 MG: 2 INJECTION INTRAMUSCULAR; INTRAVENOUS at 00:48

## 2023-07-22 RX ADMIN — MAGNESIUM SULFATE HEPTAHYDRATE 4 G: 40 INJECTION, SOLUTION INTRAVENOUS at 21:31

## 2023-07-22 RX ADMIN — SODIUM CHLORIDE 1000 ML: 9 INJECTION, SOLUTION INTRAVENOUS at 00:29

## 2023-07-22 RX ADMIN — SODIUM CHLORIDE 1000 ML: 9 INJECTION, SOLUTION INTRAVENOUS at 07:21

## 2023-07-22 RX ADMIN — LORAZEPAM 2 MG: 2 INJECTION INTRAMUSCULAR; INTRAVENOUS at 09:32

## 2023-07-22 RX ADMIN — FOLIC ACID 1 MG: 5 INJECTION, SOLUTION INTRAMUSCULAR; INTRAVENOUS; SUBCUTANEOUS at 11:40

## 2023-07-22 RX ADMIN — THIAMINE HYDROCHLORIDE 200 MG: 100 INJECTION, SOLUTION INTRAMUSCULAR; INTRAVENOUS at 15:15

## 2023-07-22 RX ADMIN — FLUOXETINE HYDROCHLORIDE 40 MG: 20 CAPSULE ORAL at 20:55

## 2023-07-22 RX ADMIN — LORAZEPAM 2 MG: 2 INJECTION INTRAMUSCULAR; INTRAVENOUS at 17:05

## 2023-07-22 NOTE — PLAN OF CARE
Goal Outcome Evaluation:  Plan of Care Reviewed With: patient      Has rested intermittently throughout the shift. D50 required x 2 for hypoglycemia. Fluids changed to D5W - see orders/MAR. Sitter has remained at bedside. Patient requesting something to drink. Reminded that can only have ice chips for now. CIWA protocol in place - see flowsheet for assessments and MAR for ativan dosing.

## 2023-07-22 NOTE — NURSING NOTE
"Past Medical History:   Diagnosis Date    A-fib (HCC)    Alcohol abuse    Alcohol withdrawal syndrome (HCC)    Anxiety    Arrhythmia    Bipolar 1 disorder (HCC)    Clostridium difficile carrier    Depression    Depression    Disease of thyroid gland    Heart murmur    Hernia of abdominal cavity 10/2020    Hypertension    Pancreas disorder    Pancreatitis    RLS (restless legs syndrome)    Small bowel obstruction (HCC)    Thyroid activity decreased     Past Surgical History:   Procedure Laterality Date    APPENDECTOMY    CHOLECYSTECTOMY    GASTRIC BYPASS    HERNIA REPAIR 10/2020   with bowel involvement    HYSTERECTOMY     Information obtained from prior medical records.      1PPD smoker  ETOH use/abuse - 1/5 or more daily \"for years\"  "

## 2023-07-22 NOTE — PROGRESS NOTES
Nurse Practitioner - Brief Progress Note  PERMANENT  07/22/2023 09:04    [facility] - [facility] - Nick - CCU - 10 - [place], [place] ([facility])    RICKI ADDISON    Date of Service 07/22/2023 09:04    HPI/Events of Note [facility] Provider Assessment Note    Tele ICU Focused Assessment Note - Information obtained from patient's chart    ***Limited information available in the EMR at this time***    53 y/o female admitted to the ICU with ETOH intoxication, anion gap metabolic acidosis- likely due to alcohol ketoacidosis and hypoglycemia    Pt with PMH significant for hypothyroid, HLD, anxiety, bipolar disorder, ETOH abuse with several ED visits this year for ETOH intoxication, further workup found  pt to have anion gap metabolic acidosis- likely due to alcohol ketoacidosis and   hypoglycemia. Pt received fluid resuscitation, several amps of bicarb and D50, thiamine ivp, folic acid ivp, started on CIWA protocol, psych consult. See labs and imaging below.    Head CT neg for acute findings.     CXR cardiomegaly, mild pulmonary vascular congestion, subsegmental atelectasis/airspace disease in bases.     Labs significant for ETOH 397-> 274, UDS positive for barbiturates and benzos, ABG 7.211/34/78/13.6-> 7.285/33.7/86.5/16; LA 3.9-> 3.6; lipase WNL; AST//110; bicarb 12.7, BUN/Cr WNL; glucose 62; AG 28.3; tylenol and salicylate WNL; UA neg for UTI,   ketones present; COVID/Influenza NEG.     Discussed pt with RN, bedside intensivist is currently present, will defer care at this time. Gotta'go Personal Care Device remains available if assistance is needed.    Camera assessment performed, pt resting, no distress, VS WNL on RA.      __x___   Video Assessment performed  __x___   Most recent labs reviewed  __x___   Vital Signs reviewed  __x___   Best Practices addressed:                 VTE prophylaxis: SCDs                 SUP (when indicated): N/A                 Glycemic control: 67-> 164, monitor                      Please  notify bedside physician when present or [facility] if glc > 180 X 2                 Sepsis guidelines: N/A                 Lung protective strategy: N/A                 Targeted Temperature Management: N/A    __x___     Spoke with bedside RN  __N___     Orders written:      Contact [facility] for any needs if bedside physician is not present.      Interventions Major-Acid-Base disturbance - evaluation and management, Change in mental status - evaluation and management  Intermediate-Best-practice therapies (e.g. VTE, beta blocker, etc.)        Electronically Signed by: Ysabel Ceja (ASTON) on 07/22/2023 09:05    Annotated By: Kevin Kramer)    Date: 07/22/23 09:12  .

## 2023-07-22 NOTE — ED NOTES
Pt unable to give us any information, she is a new patient here. Pt is intoxicated and unable to answer questions.

## 2023-07-22 NOTE — H&P
"    [facility]  History & Physical    Patient Identification:  Name:  Lorraine Lincoln  Age:  52 y.o.  Sex:  female  :  1970  MRN:  [id number]   Visit Number:  [id number]  Admit Date: 2023   Primary Care Physician:  Provider, No Known    Subjective     Chief complaint: alcohol intoxication    History of presenting illness:      Lorraine Lincoln is a 52 y.o. female with past medical history significant for reported history seizure disorder    Per verbal hand off from ED patient presented after recent DUI. Shortly after release from care home patient reportedly passed out at a local liquor store. Per review of ED documentation patient reported she drank a fifth of whiskey the night of admission. She was also reporting that she wanted to die while in the ED.     Patient was seen and examined with nursing staff at the bedside. She reports ongoing alcohol abuse for \"years.\" She drinks a fifth of liquor daily. She states she has been to rehab multiple times in the past, never to this facility. She denies history of liver dysfunction but states her enzymes have been elevated in the past. She reports she cannot recall events leading to her hospitalization. She reports is currently nauseous and has N/V very often. Never bloody. She denies shortness of breath or cough. Was placed on 2L in the ED initially though seen on room air saturating well. She denies abdominal pain or diarrhea. She denies dysuria or difficulty with urination currently though notes treated over the past week with bactrim for UTI. She reports history depressed mood and wishes to die daily since discovering her  was unfaithful. She is unable to approximate the amount of time she's been feeling this way. She denies history of suicide attempt or desire to end her life currently. She repeatedly states \"there's no hope for me.\" She reports her past medical history is significant for seizure disorder and she takes " "lamictal at home. Reports last seizure was \"recent.\" Also reports hypothyroidism and takes synthroid. She states her home medication list is extensive but these are the only two disorders she can recall currently. She denies cardiac history but states she often has chest pains and palpitations.     Upon arrival to the ED, vital signs were temp 98.4, , RR 14, /79, SpO2 88% on RA. Now on 2L NC with sats mid to upper 90s.  ABG in the ED significant for acidosis at 7.211.  Improved on most recent repeat to 7.404.  Chemistry panel significant for anion gap elevated at 28.3, AST and ALT elevated at 267 and 110 respectively.  Acetone detected, lactate elevated at 3.9 and down trended to 3.6.  UA notable for trace glucose, 3+ ketones, 1+ protein, 3-5 WBCs, trace bacteria and 3-6 squamous epithelial cells.  Ethanol level 397 on arrival, has trended down to 274 on last repeat.  Toxicology screen positive for barbiturates and benzodiazepine.  CXR notable for mild pulmonary vascular congestion subsegmental atelectasis/airspace disease in the lung bases.  CT head is negative.    Known Emergency Department medications received prior to my evaluation included D50W x3, folic acid, zofran, sodium bicarb x6, normal saline boluses total 3L, thiamine.   Room location at the time of my evaluation was CCU 3.     ---------------------------------------------------------------------------------------------------------------------   Review of Systems   Constitutional:  Negative for chills and fever.   HENT:  Negative for congestion and rhinorrhea.    Respiratory:  Negative for cough and shortness of breath.    Cardiovascular:  Negative for chest pain, palpitations and leg swelling.   Gastrointestinal:  Positive for nausea and vomiting. Negative for abdominal pain, constipation and diarrhea.   Genitourinary:  Negative for difficulty urinating and dysuria.   Musculoskeletal:  Negative for arthralgias, back pain and myalgias. "   Skin:  Negative for rash and wound.   Neurological:  Negative for dizziness and light-headedness.   Psychiatric/Behavioral:  Positive for dysphoric mood and suicidal ideas.       ---------------------------------------------------------------------------------------------------------------------   History reviewed. No pertinent past medical history.  History reviewed. No pertinent surgical history.  History reviewed. No pertinent family history.  Social History     Socioeconomic History    Marital status: Single     ---------------------------------------------------------------------------------------------------------------------   Allergies:  Patient has no known allergies.  ---------------------------------------------------------------------------------------------------------------------   Home medications:    Medications below are reported home medications pulling from within the system; at this time, these medications have not been reconciled unless otherwise specified and are in the verification process for further verifcation as current home medications.  No medications prior to admission.       Hospital Scheduled Meds:  folic acid (FOLVITE) IVPB, 1 mg, Intravenous, Daily  senna-docusate sodium, 2 tablet, Oral, BID  sodium chloride, 10 mL, Intravenous, Q12H  IV Fluids 1000 mL + additives, 100 mL/hr, Intravenous, Daily  thiamine (B-1) IV, 200 mg, Intravenous, Q8H   Followed by  [START ON 7/27/2023] thiamine, 100 mg, Oral, Daily      sodium chloride, 100 mL/hr        Current listed hospital scheduled medications may not yet reflect those currently placed in orders that are signed and held awaiting patient's arrival to floor.   ---------------------------------------------------------------------------------------------------------------------     Objective     Vital Signs:  Temp:  [98.4 °F (36.9 °C)] 98.4 °F (36.9 °C)  Heart Rate:  [] 94  Resp:  [14] 14  BP: (110-119)/(70-79) 110/70       07/22/23  0011   Weight: 68 kg (150 lb)     Body mass index is 26.57 kg/m².  ---------------------------------------------------------------------------------------------------------------------       Physical Exam  Vitals and nursing note reviewed.   Constitutional:       General: She is not in acute distress.  HENT:      Head: Normocephalic and atraumatic.   Eyes:      Extraocular Movements: Extraocular movements intact.      Conjunctiva/sclera: Conjunctivae normal.   Cardiovascular:      Rate and Rhythm: Normal rate and regular rhythm.   Pulmonary:      Effort: Pulmonary effort is normal.      Breath sounds: Normal breath sounds.   Abdominal:      Palpations: Abdomen is soft.      Tenderness: There is no abdominal tenderness.   Musculoskeletal:      Right lower leg: No edema.      Left lower leg: No edema.   Skin:     General: Skin is warm and dry.   Neurological:      Mental Status: She is alert.   Psychiatric:         Mood and Affect: Mood is depressed.         Behavior: Behavior is cooperative.             ---------------------------------------------------------------------------------------------------------------------  EKG:      ---------------------------------------------------------------------------------------------------------------------   Results from last 7 days   Lab Units 07/22/23  0536 07/22/23  0155 07/22/23  0030   LACTATE mmol/L 3.6* 3.9*  --    WBC 10*3/mm3  --   --  7.78   HEMOGLOBIN g/dL  --   --  11.0*   HEMATOCRIT %  --   --  36.1   MCV fL  --   --  80.2   MCHC g/dL  --   --  30.5*   PLATELETS 10*3/mm3  --   --  453*     Results from last 7 days   Lab Units 07/22/23  0850 07/22/23  0524 07/22/23  0051   PH, ARTERIAL pH units 7.404 7.285* 7.211*   PO2 ART mm Hg 75.5* 86.5 78.1*   PCO2, ARTERIAL mm Hg 34.0* 33.7* 34.0*   HCO3 ART mmol/L 21.3 16.0* 13.6*     Results from last 7 days   Lab Units 07/22/23  0030   SODIUM mmol/L 139   POTASSIUM mmol/L 4.3   MAGNESIUM mg/dL 2.3   CHLORIDE  mmol/L 98   CO2 mmol/L 12.7*   BUN mg/dL 15   CREATININE mg/dL 0.81   CALCIUM mg/dL 9.0   GLUCOSE mg/dL 62*   ALBUMIN g/dL 4.5   BILIRUBIN mg/dL 0.3   ALK PHOS U/L 136*   AST (SGOT) U/L 267*   ALT (SGPT) U/L 110*   Estimated Creatinine Clearance: 75.2 mL/min (by C-G formula based on SCr of 0.81 mg/dL).  No results found for: AMMONIA          No results found for: HGBA1C  Lab Results   Component Value Date    TSH 70.500 05/05/2020    FREET4 0.6 (L) 05/05/2020     Lab Results   Component Value Date    PREGTESTUR Negative 05/05/2020     Pain Management Panel          Latest Ref Rng & Units 7/22/2023   Pain Management Panel   Amphetamine, Urine Qual Negative Negative    Barbiturates Screen, Urine Negative Positive    Benzodiazepine Screen, Urine Negative Positive    Buprenorphine, Screen, Urine Negative Negative    Cocaine Screen, Urine Negative Negative    Fentanyl, Urine Negative Negative    Methadone Screen , Urine Negative Negative    Methamphetamine, Ur Negative Negative      No results found for: BLOODCX  No results found for: URINECX  No results found for: WOUNDCX  No results found for: STOOLCX      ---------------------------------------------------------------------------------------------------------------------  Imaging Results (Last 7 Days)       Procedure Component Value Units Date/Time    CT Head Without Contrast [[id number]] Collected: 07/22/23 0224     Updated: 07/22/23 0230    Narrative:      CT HEAD WO CONTRAST-     REASON FOR EXAM:  confusion, found down, intoxication     TECHNIQUE: Noncontrast 3 mm axial sections with sagittal and coronal  reformats.     COMPARISON: No prior studies available for comparison at this time.     FINDINGS: There is no skull fracture. There is no acute intracranial  hemorrhage. There is no extra-axial mass or fluid collection. There is  no intra-axial mass. There is no mass effect or shift of midline  structures. The ventricles are normal in size. There are no foci  of  abnormal attenuation within the brain. The mastoid air cells and middle  ear cavities are clear. The visualized paranasal sinuses are clear.       Impression:      Negative noncontrast head CT.     This report was finalized on 7/22/2023 2:28 AM by Ysabel Paredes MD.       XR Chest 1 View [[id number]] Collected: 07/22/23 0139     Updated: 07/22/23 0142    Narrative:      INDICATION: Cough     TECHNIQUE: Frontal radiograph of the chest.     COMPARISON: None.     FINDINGS:   Cardiomegaly. Mild pulmonary vascular congestion. Subsegmental  atelectasis/airspace disease in the lung bases. No pleural effusion or  pneumothorax.  No acute fracture.       Impression:      Mild pulmonary vascular congestion. Subsegmental atelectasis/airspace  disease in the lung bases     This report was finalized on 7/22/2023 1:39 AM by Alex Pallas, DO.               Cultures:  No results found for: BLOODCX, URINECX, WOUNDCX, MRSACX, RESPCX, STOOLCX    Last echocardiogram:          I have personally reviewed the above radiology images and read the final radiology report on 07/22/23  ---------------------------------------------------------------------------------------------------------------------  Assessment / Plan     Active Hospital Problems    Diagnosis  POA    **Alcohol intoxication delirium [F10.121]  Yes       ASSESSMENT/PLAN:    Alcohol intoxication delirium, POA  Elevated anion gap metabolic acidosis, POA  Transaminitis, POA, likely 2/2 alcohol abuse  Suicidal Ideation, POA  Patient presents after being brought to the ED, intoxicated. Reports longstanding history of alcohol abuse. Drinks a fifth of liquor daily.   Anion gap in the ED 28.3.  Initial ABG with pH 7.2.  Has improved following bicarb in the ED to 7.4 on most recent repeat.  Also presented with hypoglycemia, improved following D50.  Lactate 3.9 on arrival has trended down to 3.6 following fluid replacement, total of 3 L thus far.  Ethanol level on arrival 397, trended  down to 274 around 6 AM.  Toxicology screen positive for barbiturates and benzodiazepines.  CXR read as mild pulmonary vascular congestion with subsegmental atelectasis/airspace disease in the bases.  Patient was hypoxic at 88% on arrival and placed on 2 L per nasal cannula though now weaned to room air and saturating appropriately.  No leukocytosis, no fever.  Lungs are clear to auscultation bilaterally on exam.  CT head in the ED was negative for acute change.  She was admitted to the CCU for further management.  Continue to monitor with continuous pulse oximetry/cardiac monitoring.  Initiate and follow CIWA protocol.  Replace B vitamins.  Continue to replace fluids at 100 mL/h normal saline.  Monitor and replace electrolytes as needed.  N.p.o. for now  Safety, seizure and suicide precautions in place.  Sitter placed at the bedside.  Repeat CBC, CMP.  Trend lactic acid.  Consult psychiatry for further assistance, appreciated.  Continue supportive care    Chronic:  Reported seizure disorder  Reported hypothyroidism  Resume home medications as indicated as med rec is available.  Monitor vital signs per protocol  ----------  -DVT prophylaxis: SCDs  -Activity: Advance per guidelines  -Expected length of stay: INPATIENT status due to the need for care which can only be reasonably provided in an hospital setting such as aggressive/expedited ancillary services and/or consultation services, the necessity for IV medications, close physician monitoring and/or the possible need for procedures.  In such, I feel patient’s risk for adverse outcomes and need for care warrant INPATIENT evaluation and predict the patient’s care encounter to likely last beyond 2 midnights.   -Disposition pending course    High risk secondary to alcohol intoxication, history of abuse with dependence    Code Status and Medical Interventions:   Ordered at: 07/22/23 0630     Code Status (Patient has no pulse and is not breathing):    CPR (Attempt to  Resuscitate)     Medical Interventions (Patient has pulse or is breathing):    Full Support     Release to patient:    Routine Release       Misael Gabriel PA-C   07/22/23  09:03 EDT

## 2023-07-22 NOTE — CONSULTS
Referring Provider: Austin  Reason for Consultation: SI and ETOH Dependence       Chief complaint/Focus of Exam: Suicidal ideation and alcohol dependence    Subjective .     History of present illness: Patient is a 52-year-old female with a history of alcohol use disorder with multiple legal issues as a result, history of alcohol withdrawal with seizures, hypothyroid.  She presented to the hospital after passing out in a liquor store after getting out of long term for DUI.  Psychiatry was consulted to evaluate patient as she has reported suicidal ideation and to assist with detox.  I evaluated patient at bedside with sitter present.  She was somnolent and difficult to arouse but did state that she has had worsening depression with suicidal ideation after finding her  in bed with another woman 4 months ago.  She states that she feels safe in the hospital and does not want to die or do anything to herself but she continues to have recurrent thoughts due to the situation.  She does not currently seem to be having major withdrawal symptoms.  And unable to elicit much more information given her somnolent state.    Review of Systems  Pertinent items are noted in HPI    History  History reviewed. No pertinent past medical history., History reviewed. No pertinent surgical history., History reviewed. No pertinent family history.,   Social History     Socioeconomic History    Marital status: Single   Tobacco Use    Smoking status: Every Day     Types: Cigarettes   Substance and Sexual Activity    Alcohol use: Yes     Alcohol/week: 20.0 standard drinks     Types: 20 Shots of liquor per week    Drug use: Never    Sexual activity: Defer     E-cigarette/Vaping     E-cigarette/Vaping Substances     E-cigarette/Vaping Devices         ,   No medications prior to admission.   , Scheduled Meds:  folic acid (FOLVITE) IVPB, 1 mg, Intravenous, Daily  senna-docusate sodium, 2 tablet, Oral, BID  sodium chloride, 10 mL, Intravenous,  Q12H  thiamine (B-1) IV, 200 mg, Intravenous, Q8H   Followed by  [START ON 7/27/2023] thiamine, 100 mg, Oral, Daily   , Continuous Infusions:  dextrose, 75 mL/hr, Last Rate: 75 mL/hr (07/22/23 1323)   , PRN Meds:    senna-docusate sodium **AND** polyethylene glycol **AND** bisacodyl **AND** bisacodyl    dextrose    dextrose    glucagon (human recombinant)    LORazepam **OR** LORazepam **OR** LORazepam **OR** LORazepam **OR** LORazepam **OR** LORazepam **OR** LORazepam **OR** LORazepam    Magnesium Standard Dose Replacement - Follow Nurse / BPA Driven Protocol    nitroglycerin    Insert Peripheral IV **AND** sodium chloride    sodium chloride    sodium chloride, and Allergies:  Patient has no known allergies.    Objective     Vital Signs   Temp:  [98.4 °F (36.9 °C)] 98.4 °F (36.9 °C)  Heart Rate:  [] 105  Resp:  [14-23] 23  BP: ()/(55-79) 97/63    Mental Status Exam:   Mental Status Exam:    Hygiene:   good  Cooperation:  Cooperative  Eye Contact:  Closed  Psychomotor Behavior:  Slow  Affect:  Blunted  Hopelessness: 10  Speech:  Minimal and Dysarthria  Thought Progress:  Goal directed and Linear  Thought Content:  Mood congruent  Suicidal:  Suicidal Ideation  Homicidal:  None  Hallucinations:  None  Delusion:  None  Memory:  Intact  Orientation:  Person, Place, Time, and Situation  Reliability:  poor  Insight:  Poor  Judgement:  Poor  Impulse Control:  Poor    Results Review:   I reviewed the patient's new clinical results.  Lab Results (last 24 hours)       Procedure Component Value Units Date/Time    POC Glucose Once [[id number]]  (Abnormal) Collected: 07/22/23 1359    Specimen: Blood Updated: 07/22/23 1405     Glucose 180 mg/dL      Comment: Meter: [id number] : [id number] PAYAL CURRAN       POC Glucose Once [[id number]]  (Abnormal) Collected: 07/22/23 1320    Specimen: Blood Updated: 07/22/23 1327     Glucose 61 mg/dL      Comment: Meter: [id number] : [id number] FLAKO PATTON       CBC &  Differential [[id number]]  (Abnormal) Collected: 07/22/23 1042    Specimen: Blood Updated: 07/22/23 1207    Narrative:      The following orders were created for panel order CBC & Differential.  Procedure                               Abnormality         Status                     ---------                               -----------         ------                     CBC Auto Differential[[id number]]        Abnormal            Final result               Scan Slide[[id number]]                                       Final result                 Please view results for these tests on the individual orders.    Scan Slide [[id number]] Collected: 07/22/23 1042    Specimen: Blood Updated: 07/22/23 1207     Anisocytosis Mod/2+     Hypochromia Slight/1+     Microcytes Mod/2+     Platelet Morphology Normal    POC Glucose Once [[id number]]  (Abnormal) Collected: 07/22/23 1122    Specimen: Blood Updated: 07/22/23 1139     Glucose 160 mg/dL      Comment: Meter: [id number] : [id number] RADHA JONATHAN       STAT Lactic Acid, Reflex [[id number]]  (Abnormal) Collected: 07/22/23 1042    Specimen: Blood Updated: 07/22/23 1126     Lactate 3.4 mmol/L     CBC Auto Differential [[id number]]  (Abnormal) Collected: 07/22/23 1042    Specimen: Blood Updated: 07/22/23 1109     WBC 7.92 10*3/mm3      RBC 3.40 10*6/mm3      Hemoglobin 8.4 g/dL      Hematocrit 26.3 %      MCV 77.4 fL      MCH 24.7 pg      MCHC 31.9 g/dL      RDW 22.2 %      RDW-SD 61.3 fl      MPV 9.1 fL      Platelets 316 10*3/mm3      Neutrophil % 65.0 %      Lymphocyte % 19.9 %      Monocyte % 14.1 %      Eosinophil % 0.1 %      Basophil % 0.5 %      Immature Grans % 0.4 %      Neutrophils, Absolute 5.14 10*3/mm3      Lymphocytes, Absolute 1.58 10*3/mm3      Monocytes, Absolute 1.12 10*3/mm3      Eosinophils, Absolute 0.01 10*3/mm3      Basophils, Absolute 0.04 10*3/mm3      Immature Grans, Absolute 0.03 10*3/mm3      nRBC 0.0 /100 WBC     POC Glucose Once [[id number]]  (Abnormal)  Collected: 07/22/23 1039    Specimen: Blood Updated: 07/22/23 1045     Glucose 59 mg/dL      Comment: Meter: [id number] : [id number] jarod quach       JOSE Lactic Acid, Reflex [[id number]]  (Abnormal) Collected: 07/22/23 0911    Specimen: Blood Updated: 07/22/23 0959     Lactate 3.9 mmol/L     Comprehensive Metabolic Panel [[id number]]  (Abnormal) Collected: 07/22/23 0911    Specimen: Blood Updated: 07/22/23 0958     Glucose 82 mg/dL      BUN 9 mg/dL      Creatinine 0.48 mg/dL      Sodium 137 mmol/L      Potassium 3.7 mmol/L      Comment: Slight hemolysis detected by analyzer. Results may be affected.        Chloride 97 mmol/L      CO2 18.1 mmol/L      Calcium 7.1 mg/dL      Total Protein 5.3 g/dL      Albumin 3.5 g/dL      ALT (SGPT) 88 U/L      AST (SGOT) 201 U/L      Alkaline Phosphatase 104 U/L      Total Bilirubin 0.2 mg/dL      Globulin 1.8 gm/dL      A/G Ratio 1.9 g/dL      BUN/Creatinine Ratio 18.8     Anion Gap 21.9 mmol/L      eGFR 114.1 mL/min/1.73     Narrative:      GFR Normal >60  Chronic Kidney Disease <60  Kidney Failure <15      POC Glucose Once [[id number]]  (Normal) Collected: 07/22/23 0851    Specimen: Blood Updated: 07/22/23 0858     Glucose 106 mg/dL      Comment: Meter: [id number] : [id number] weston kramer       Blood Gas, Arterial With Co-Ox [[id number]]  (Abnormal) Collected: 07/22/23 0850    Specimen: Arterial Blood Updated: 07/22/23 0852     Site Right Radial     Rodney's Test Positive     pH, Arterial 7.404 pH units      pCO2, Arterial 34.0 mm Hg      pO2, Arterial 75.5 mm Hg      Comment: 84 Value below reference range        HCO3, Arterial 21.3 mmol/L      Base Excess, Arterial -3.0 mmol/L      O2 Saturation, Arterial 95.1 %      Hemoglobin, Blood Gas 8.5 g/dL      Comment: 84 Value below reference range        Hematocrit, Blood Gas 26.1 %      Comment: 84 Value below reference range        Oxyhemoglobin 92.6 %      Comment: 84 Value below reference range        Methemoglobin  0.80 %      Carboxyhemoglobin 1.8 %      A-a DO2 28.6 mmHg      CO2 Content 22.3 mmol/L      Barometric Pressure for Blood Gas 725 mmHg      Modality Room Air     FIO2 21 %      Ventilator Mode NA     Note --     Collected by [id number]     Comment: Meter: [id number]     :  [id number]        pH, Temp Corrected --     pCO2, Temperature Corrected --     pO2, Temperature Corrected --    POC Glucose Once [[id number]]  (Abnormal) Collected: 07/22/23 0752    Specimen: Blood Updated: 07/22/23 0758     Glucose 164 mg/dL      Comment: Meter: [id number] : [id number] weston kramer       Lipase [[id number]]  (Normal) Collected: 07/22/23 0536    Specimen: Blood from Hand, Right Updated: 07/22/23 0633     Lipase 24 U/L     STAT Lactic Acid, Reflex [[id number]]  (Abnormal) Collected: 07/22/23 0536    Specimen: Blood from Hand, Right Updated: 07/22/23 0559     Lactate 3.6 mmol/L     Ethanol [[id number]]  (Abnormal) Collected: 07/22/23 0536    Specimen: Blood from Hand, Right Updated: 07/22/23 0555     Ethanol 274 mg/dL      Ethanol % 0.274 %     Narrative:      >/= 80.0 legally intoxicated    Blood Gas, Arterial With Co-Ox [[id number]]  (Abnormal) Collected: 07/22/23 0524    Specimen: Arterial Blood Updated: 07/22/23 0525     Site Right Brachial     Rodney's Test N/A     pH, Arterial 7.285 pH units      Comment: 84 Value below reference range        pCO2, Arterial 33.7 mm Hg      pO2, Arterial 86.5 mm Hg      HCO3, Arterial 16.0 mmol/L      Comment: 84 Value below reference range        Base Excess, Arterial -9.8 mmol/L      O2 Saturation, Arterial 94.7 %      Hemoglobin, Blood Gas 9.4 g/dL      Comment: 84 Value below reference range        Hematocrit, Blood Gas 28.9 %      Comment: 84 Value below reference range        Oxyhemoglobin 93.0 %      Comment: 84 Value below reference range        Methemoglobin 0.30 %      Carboxyhemoglobin 1.5 %      A-a DO2 17.8 mmHg      CO2 Content 17.0 mmol/L      Barometric Pressure for  Blood Gas 725 mmHg      Modality Room Air     FIO2 21 %      Ventilator Mode NA     Note --     Notified Who VIRGILIO BURNS     Collected by [id number]     Comment: Meter: [id number]     :  [id number]        pH, Temp Corrected --     pCO2, Temperature Corrected --     pO2, Temperature Corrected --    Urinalysis, Microscopic Only - Straight Cath [[id number]]  (Abnormal) Collected: 07/22/23 0353    Specimen: Urine from Straight Cath Updated: 07/22/23 0428     RBC, UA 0-2 /HPF      WBC, UA 3-5 /HPF      Comment: Urine culture not indicated.        Bacteria, UA Trace /HPF      Squamous Epithelial Cells, UA 3-6 /HPF      Hyaline Casts, UA 0-2 /LPF      Coarse Granular Casts, UA 7-12 /LPF      Methodology Manual Light Microscopy    Urinalysis With Culture If Indicated - Straight Cath [[id number]]  (Abnormal) Collected: 07/22/23 0353    Specimen: Urine from Straight Cath Updated: 07/22/23 0428     Color, UA Yellow     Appearance, UA Clear     pH, UA 5.5     Specific Gravity, UA 1.021     Glucose,  mg/dL (Trace)     Ketones, UA 80 mg/dL (3+)     Bilirubin, UA Negative     Blood, UA Negative     Protein, UA 30 mg/dL (1+)     Leuk Esterase, UA Negative     Nitrite, UA Negative     Urobilinogen, UA 1.0 E.U./dL    Narrative:      In absence of clinical symptoms, the presence of pyuria, bacteria, and/or nitrites on the urinalysis result does not correlate with infection.    Fentanyl, Urine - Straight Cath [[id number]]  (Normal) Collected: 07/22/23 0353    Specimen: Urine from Straight Cath Updated: 07/22/23 0416     Fentanyl, Urine Negative    Narrative:      Negative Threshold:      Fentanyl 5 ng/mL     The normal value for the drug tested is negative. This report includes final unconfirmed screening results to be used for medical treatment purposes only. Unconfirmed results must not be used for non-medical purposes such as employment or legal testing. Clinical consideration should be applied to any drug of abuse  test, particularly when unconfirmed results are used.           Urine Drug Screen - Straight Cath [[id number]]  (Abnormal) Collected: 07/22/23 0353    Specimen: Urine from Straight Cath Updated: 07/22/23 0416     THC, Screen, Urine Negative     Phencyclidine (PCP), Urine Negative     Cocaine Screen, Urine Negative     Methamphetamine, Ur Negative     Opiate Screen Negative     Amphetamine Screen, Urine Negative     Benzodiazepine Screen, Urine Positive     Tricyclic Antidepressants Screen Negative     Methadone Screen, Urine Negative     Barbiturates Screen, Urine Positive     Oxycodone Screen, Urine Negative     Propoxyphene Screen Negative     Buprenorphine, Screen, Urine Negative    Narrative:      Cutoff For Drugs Screened:    Amphetamines               500 ng/ml  Barbiturates               200 ng/ml  Benzodiazepines            150 ng/ml  Cocaine                    150 ng/ml  Methadone                  200 ng/ml  Opiates                    100 ng/ml  Phencyclidine               25 ng/ml  THC                            50 ng/ml  Methamphetamine            500 ng/ml  Tricyclic Antidepressants  300 ng/ml  Oxycodone                  100 ng/ml  Propoxyphene               300 ng/ml  Buprenorphine               10 ng/ml    The normal value for all drugs tested is negative. This report includes unconfirmed screening results, with the cutoff values listed, to be used for medical treatment purposes only.  Unconfirmed results must not be used for non-medical purposes such as employment or legal testing.  Clinical consideration should be applied to any drug of abuse test, particularly when unconfirmed results are used.      Lactic Acid, Plasma [[id number]]  (Abnormal) Collected: 07/22/23 0155    Specimen: Blood Updated: 07/22/23 0249     Lactate 3.9 mmol/L     [place] Draw [[id number]] Collected: 07/22/23 0030    Specimen: Blood Updated: 07/22/23 0145    Narrative:      The following orders were created for panel order [place]  Draw.  Procedure                               Abnormality         Status                     ---------                               -----------         ------                     Green Top (Gel)[[id number]]                                  Final result               Lavender Top[[id number]]                                     Final result               Gold Top - SST[[id number]]                                                              Light Blue Top[[id number]]                                   Final result                 Please view results for these tests on the individual orders.    Green Top (Gel) [[id number]] Collected: 07/22/23 0030    Specimen: Blood Updated: 07/22/23 0145     Extra Tube Hold for add-ons.     Comment: Auto resulted.       Light Blue Top [[id number]] Collected: 07/22/23 0030    Specimen: Blood Updated: 07/22/23 0145     Extra Tube Hold for add-ons.     Comment: Auto resulted       Lavender Top [[id number]] Collected: 07/22/23 0030    Specimen: Blood Updated: 07/22/23 0145     Extra Tube hold for add-on     Comment: Auto resulted       Hepatitis Panel, Acute [[id number]]  (Normal) Collected: 07/22/23 0057    Specimen: Blood Updated: 07/22/23 0127     Hepatitis B Surface Ag Non-Reactive     Hep A IgM Non-Reactive     Hep B C IgM Non-Reactive     Hepatitis C Ab Non-Reactive    Narrative:      Results may be falsely decreased if patient taking Biotin.     Acetone [[id number]]  (Abnormal) Collected: 07/22/23 0057    Specimen: Blood Updated: 07/22/23 0110     Acetone Small    COVID-19 and FLU A/B PCR - Swab, Nasopharynx [[id number]]  (Normal) Collected: 07/22/23 0047    Specimen: Swab from Nasopharynx Updated: 07/22/23 0109     COVID19 Not Detected     Influenza A PCR Not Detected     Influenza B PCR Not Detected    Narrative:      Fact sheet for providers: https://www.fda.gov/media/822066/download    Fact sheet for patients: https://www.fda.gov/media/972068/download    Test performed by PCR.     Comprehensive Metabolic Panel [[id number]]  (Abnormal) Collected: 07/22/23 0030    Specimen: Blood Updated: 07/22/23 0109     Glucose 62 mg/dL      BUN 15 mg/dL      Creatinine 0.81 mg/dL      Sodium 139 mmol/L      Potassium 4.3 mmol/L      Comment: Specimen hemolyzed.  Results may be affected.1+ Hemolysis            Chloride 98 mmol/L      CO2 12.7 mmol/L      Calcium 9.0 mg/dL      Total Protein 7.9 g/dL      Albumin 4.5 g/dL      ALT (SGPT) 110 U/L      Comment: Specimen hemolyzed.  Results may be affected.        AST (SGOT) 267 U/L      Alkaline Phosphatase 136 U/L      Total Bilirubin 0.3 mg/dL      Globulin 3.4 gm/dL      A/G Ratio 1.3 g/dL      BUN/Creatinine Ratio 18.5     Anion Gap 28.3 mmol/L      eGFR 87.5 mL/min/1.73     Narrative:      GFR Normal >60  Chronic Kidney Disease <60  Kidney Failure <15      Magnesium [[id number]]  (Normal) Collected: 07/22/23 0030    Specimen: Blood Updated: 07/22/23 0109     Magnesium 2.3 mg/dL     Acetaminophen Level [[id number]]  (Normal) Collected: 07/22/23 0030    Specimen: Blood Updated: 07/22/23 0101     Acetaminophen <5.0 mcg/mL     Ethanol [[id number]]  (Abnormal) Collected: 07/22/23 0030    Specimen: Blood Updated: 07/22/23 0101     Ethanol 397 mg/dL      Ethanol % 0.397 %     Narrative:      >/= 80.0 legally intoxicated    Salicylate Level [[id number]]  (Normal) Collected: 07/22/23 0030    Specimen: Blood Updated: 07/22/23 0101     Salicylate <0.3 mg/dL     CBC & Differential [[id number]]  (Abnormal) Collected: 07/22/23 0030    Specimen: Blood Updated: 07/22/23 0054    Narrative:      The following orders were created for panel order CBC & Differential.  Procedure                               Abnormality         Status                     ---------                               -----------         ------                     CBC Auto Differential[[id number]]        Abnormal            Final result               Scan Slide[[id number]]                                        Final result                 Please view results for these tests on the individual orders.    CBC Auto Differential [[id number]]  (Abnormal) Collected: 07/22/23 0030    Specimen: Blood Updated: 07/22/23 0054     WBC 7.78 10*3/mm3      RBC 4.50 10*6/mm3      Hemoglobin 11.0 g/dL      Hematocrit 36.1 %      MCV 80.2 fL      MCH 24.4 pg      MCHC 30.5 g/dL      RDW 23.1 %      RDW-SD 66.0 fl      MPV 8.8 fL      Platelets 453 10*3/mm3      Neutrophil % 73.3 %      Lymphocyte % 15.6 %      Monocyte % 9.4 %      Eosinophil % 0.1 %      Basophil % 1.3 %      Immature Grans % 0.3 %      Neutrophils, Absolute 5.71 10*3/mm3      Lymphocytes, Absolute 1.21 10*3/mm3      Monocytes, Absolute 0.73 10*3/mm3      Eosinophils, Absolute 0.01 10*3/mm3      Basophils, Absolute 0.10 10*3/mm3      Immature Grans, Absolute 0.02 10*3/mm3      nRBC 0.0 /100 WBC     Scan Slide [[id number]] Collected: 07/22/23 0030    Specimen: Blood Updated: 07/22/23 0054     Hypochromia Slight/1+     Target Cells Slight/1+     Large Platelets Slight/1+    hCG, Serum, Qualitative [[id number]]  (Normal) Collected: 07/22/23 0030    Specimen: Blood Updated: 07/22/23 0054     HCG Qualitative Negative    Blood Gas, Arterial With Co-Ox [[id number]]  (Abnormal) Collected: 07/22/23 0051    Specimen: Arterial Blood Updated: 07/22/23 0052     Site Left Radial     Rodney's Test Positive     pH, Arterial 7.211 pH units      Comment: 85 Value below critical limit        pCO2, Arterial 34.0 mm Hg      pO2, Arterial 78.1 mm Hg      Comment: 84 Value below reference range        HCO3, Arterial 13.6 mmol/L      Comment: 84 Value below reference range        Base Excess, Arterial -13.2 mmol/L      O2 Saturation, Arterial 90.4 %      Comment: 84 Value below reference range        Hemoglobin, Blood Gas 10.2 g/dL      Comment: 84 Value below reference range        Hematocrit, Blood Gas 31.2 %      Comment: 84 Value below reference range        Oxyhemoglobin 88.4 %      Comment: 84  Value below reference range        Methemoglobin 0.70 %      Carboxyhemoglobin 1.5 %      A-a DO2 25.8 mmHg      CO2 Content 14.6 mmol/L      Barometric Pressure for Blood Gas 724 mmHg      Modality Room Air     FIO2 21 %      Ventilator Mode NA     Note --     Notified Who DR MAZARIEGOS AND RN     Notified By [id number]     Notified Time 07/22/2023 00:56     Collected by [id number]     Comment: Meter: [id number]     :  [id number]        pH, Temp Corrected --     pCO2, Temperature Corrected --     pO2, Temperature Corrected --    POC Glucose Once [[id number]]  (Abnormal) Collected: 07/22/23 0022    Specimen: Blood Updated: 07/22/23 0028     Glucose 67 mg/dL      Comment: Meter: [id number] : [id number] Hari Reagan             Imaging Results (Last 24 Hours)       Procedure Component Value Units Date/Time    CT Head Without Contrast [[id number]] Collected: 07/22/23 0224     Updated: 07/22/23 0230    Narrative:      CT HEAD WO CONTRAST-     REASON FOR EXAM:  confusion, found down, intoxication     TECHNIQUE: Noncontrast 3 mm axial sections with sagittal and coronal  reformats.     COMPARISON: No prior studies available for comparison at this time.     FINDINGS: There is no skull fracture. There is no acute intracranial  hemorrhage. There is no extra-axial mass or fluid collection. There is  no intra-axial mass. There is no mass effect or shift of midline  structures. The ventricles are normal in size. There are no foci of  abnormal attenuation within the brain. The mastoid air cells and middle  ear cavities are clear. The visualized paranasal sinuses are clear.       Impression:      Negative noncontrast head CT.     This report was finalized on 7/22/2023 2:28 AM by Ysabel Paredes MD.       XR Chest 1 View [[id number]] Collected: 07/22/23 0139     Updated: 07/22/23 0142    Narrative:      INDICATION: Cough     TECHNIQUE: Frontal radiograph of the chest.     COMPARISON: None.     FINDINGS:   Cardiomegaly. Mild  pulmonary vascular congestion. Subsegmental  atelectasis/airspace disease in the lung bases. No pleural effusion or  pneumothorax.  No acute fracture.       Impression:      Mild pulmonary vascular congestion. Subsegmental atelectasis/airspace  disease in the lung bases     This report was finalized on 7/22/2023 1:39 AM by Alex Pallas, DO.                 Assessment & Plan     Alcohol use disorder, severe, dependence, with delirium and withdrawal  -Continue with CIWA and Ativan detox protocol  -Encouraged cessation  -Encouraged patient to consider substance abuse treatment including inpatient rehab given her extensive alcohol use history and continuous legal issues stemming from alcohol use over the past few years    Suicidal ideation  -Patient having suicidal ideation secondary to marital issues with her 's infidelity but reports that she wants to be safe and has no desire or intent to harm herself  -Okay to discontinue sitter and suicide precautions while hospitalized  -Once patient is more medically stable and alert, we will need to reevaluate patient's suicidal ideation and if she continues to endorse this, patient may need further hospitalization on the psychiatric unit for safety and crisis stabilization      I discussed the patients findings and my recommendations with patient, nursing staff, and consulting provider    Keyshawn Welch MD  07/22/23  14:30 EDT

## 2023-07-23 LAB
ALBUMIN SERPL-MCNC: 3.3 G/DL (ref 3.5–5.2)
ALBUMIN/GLOB SERPL: 1.5 G/DL
ALP SERPL-CCNC: 104 U/L (ref 39–117)
ALT SERPL W P-5'-P-CCNC: 61 U/L (ref 1–33)
ANION GAP SERPL CALCULATED.3IONS-SCNC: 10.5 MMOL/L (ref 5–15)
ANISOCYTOSIS BLD QL: NORMAL
AST SERPL-CCNC: 83 U/L (ref 1–32)
BASOPHILS # BLD AUTO: 0.03 10*3/MM3 (ref 0–0.2)
BASOPHILS NFR BLD AUTO: 0.5 % (ref 0–1.5)
BILIRUB SERPL-MCNC: 0.5 MG/DL (ref 0–1.2)
BUN SERPL-MCNC: 5 MG/DL (ref 6–20)
BUN/CREAT SERPL: 9.4 (ref 7–25)
CALCIUM SPEC-SCNC: 8.3 MG/DL (ref 8.6–10.5)
CHLORIDE SERPL-SCNC: 100 MMOL/L (ref 98–107)
CO2 SERPL-SCNC: 23.5 MMOL/L (ref 22–29)
CREAT SERPL-MCNC: 0.53 MG/DL (ref 0.57–1)
DEPRECATED RDW RBC AUTO: 63.4 FL (ref 37–54)
EGFRCR SERPLBLD CKD-EPI 2021: 111.4 ML/MIN/1.73
EOSINOPHIL # BLD AUTO: 0.07 10*3/MM3 (ref 0–0.4)
EOSINOPHIL NFR BLD AUTO: 1.1 % (ref 0.3–6.2)
ERYTHROCYTE [DISTWIDTH] IN BLOOD BY AUTOMATED COUNT: 22.5 % (ref 12.3–15.4)
GLOBULIN UR ELPH-MCNC: 2.2 GM/DL
GLUCOSE BLDC GLUCOMTR-MCNC: 101 MG/DL (ref 70–130)
GLUCOSE BLDC GLUCOMTR-MCNC: 109 MG/DL (ref 70–130)
GLUCOSE BLDC GLUCOMTR-MCNC: 113 MG/DL (ref 70–130)
GLUCOSE BLDC GLUCOMTR-MCNC: 160 MG/DL (ref 70–130)
GLUCOSE SERPL-MCNC: 93 MG/DL (ref 65–99)
HCT VFR BLD AUTO: 28.3 % (ref 34–46.6)
HGB BLD-MCNC: 8.8 G/DL (ref 12–15.9)
HYPOCHROMIA BLD QL: NORMAL
IMM GRANULOCYTES # BLD AUTO: 0.01 10*3/MM3 (ref 0–0.05)
IMM GRANULOCYTES NFR BLD AUTO: 0.2 % (ref 0–0.5)
LYMPHOCYTES # BLD AUTO: 1.61 10*3/MM3 (ref 0.7–3.1)
LYMPHOCYTES NFR BLD AUTO: 26.2 % (ref 19.6–45.3)
MAGNESIUM SERPL-MCNC: 2.7 MG/DL (ref 1.6–2.6)
MCH RBC QN AUTO: 24.7 PG (ref 26.6–33)
MCHC RBC AUTO-ENTMCNC: 31.1 G/DL (ref 31.5–35.7)
MCV RBC AUTO: 79.5 FL (ref 79–97)
MICROCYTES BLD QL: NORMAL
MONOCYTES # BLD AUTO: 0.63 10*3/MM3 (ref 0.1–0.9)
MONOCYTES NFR BLD AUTO: 10.2 % (ref 5–12)
NEUTROPHILS NFR BLD AUTO: 3.8 10*3/MM3 (ref 1.7–7)
NEUTROPHILS NFR BLD AUTO: 61.8 % (ref 42.7–76)
NRBC BLD AUTO-RTO: 0 /100 WBC (ref 0–0.2)
PLAT MORPH BLD: NORMAL
PLATELET # BLD AUTO: 298 10*3/MM3 (ref 140–450)
PMV BLD AUTO: 9 FL (ref 6–12)
POTASSIUM SERPL-SCNC: 4 MMOL/L (ref 3.5–5.2)
POTASSIUM SERPL-SCNC: 4 MMOL/L (ref 3.5–5.2)
PROT SERPL-MCNC: 5.5 G/DL (ref 6–8.5)
QT INTERVAL: 500 MS
QTC INTERVAL: 670 MS
RBC # BLD AUTO: 3.56 10*6/MM3 (ref 3.77–5.28)
SODIUM SERPL-SCNC: 134 MMOL/L (ref 136–145)
TSH SERPL DL<=0.05 MIU/L-ACNC: 37.52 UIU/ML (ref 0.27–4.2)
WBC NRBC COR # BLD: 6.15 10*3/MM3 (ref 3.4–10.8)

## 2023-07-23 PROCEDURE — 25010000002 PROCHLORPERAZINE 10 MG/2ML SOLUTION: Performed by: STUDENT IN AN ORGANIZED HEALTH CARE EDUCATION/TRAINING PROGRAM

## 2023-07-23 PROCEDURE — 99233 SBSQ HOSP IP/OBS HIGH 50: CPT | Performed by: STUDENT IN AN ORGANIZED HEALTH CARE EDUCATION/TRAINING PROGRAM

## 2023-07-23 PROCEDURE — 25010000002 THIAMINE PER 100 MG: Performed by: STUDENT IN AN ORGANIZED HEALTH CARE EDUCATION/TRAINING PROGRAM

## 2023-07-23 PROCEDURE — 83735 ASSAY OF MAGNESIUM: CPT | Performed by: HOSPITALIST

## 2023-07-23 PROCEDURE — 80053 COMPREHEN METABOLIC PANEL: CPT | Performed by: HOSPITALIST

## 2023-07-23 PROCEDURE — 85025 COMPLETE CBC W/AUTO DIFF WBC: CPT | Performed by: HOSPITALIST

## 2023-07-23 PROCEDURE — 25010000002 LORAZEPAM PER 2 MG: Performed by: STUDENT IN AN ORGANIZED HEALTH CARE EDUCATION/TRAINING PROGRAM

## 2023-07-23 PROCEDURE — 84443 ASSAY THYROID STIM HORMONE: CPT | Performed by: STUDENT IN AN ORGANIZED HEALTH CARE EDUCATION/TRAINING PROGRAM

## 2023-07-23 PROCEDURE — 82948 REAGENT STRIP/BLOOD GLUCOSE: CPT

## 2023-07-23 PROCEDURE — 84132 ASSAY OF SERUM POTASSIUM: CPT | Performed by: HOSPITALIST

## 2023-07-23 PROCEDURE — 85007 BL SMEAR W/DIFF WBC COUNT: CPT | Performed by: HOSPITALIST

## 2023-07-23 RX ORDER — PROCHLORPERAZINE EDISYLATE 5 MG/ML
5 INJECTION INTRAMUSCULAR; INTRAVENOUS EVERY 6 HOURS PRN
Status: DISCONTINUED | OUTPATIENT
Start: 2023-07-23 | End: 2023-07-24 | Stop reason: HOSPADM

## 2023-07-23 RX ADMIN — THIAMINE HYDROCHLORIDE 200 MG: 100 INJECTION, SOLUTION INTRAMUSCULAR; INTRAVENOUS at 05:37

## 2023-07-23 RX ADMIN — FLUOXETINE HYDROCHLORIDE 40 MG: 20 CAPSULE ORAL at 09:03

## 2023-07-23 RX ADMIN — FOLIC ACID 1 MG: 5 INJECTION, SOLUTION INTRAMUSCULAR; INTRAVENOUS; SUBCUTANEOUS at 09:02

## 2023-07-23 RX ADMIN — DEXTROSE MONOHYDRATE 75 ML/HR: 50 INJECTION, SOLUTION INTRAVENOUS at 00:29

## 2023-07-23 RX ADMIN — DEXTROSE MONOHYDRATE 75 ML/HR: 50 INJECTION, SOLUTION INTRAVENOUS at 15:04

## 2023-07-23 RX ADMIN — LORAZEPAM 2 MG: 2 INJECTION INTRAMUSCULAR; INTRAVENOUS at 22:29

## 2023-07-23 RX ADMIN — LORAZEPAM 1 MG: 2 INJECTION INTRAMUSCULAR; INTRAVENOUS at 16:52

## 2023-07-23 RX ADMIN — DOCUSATE SODIUM 50 MG AND SENNOSIDES 8.6 MG 2 TABLET: 8.6; 5 TABLET, FILM COATED ORAL at 09:03

## 2023-07-23 RX ADMIN — Medication 10 ML: at 09:04

## 2023-07-23 RX ADMIN — LORAZEPAM 1 MG: 2 INJECTION INTRAMUSCULAR; INTRAVENOUS at 11:08

## 2023-07-23 RX ADMIN — THIAMINE HYDROCHLORIDE 200 MG: 100 INJECTION, SOLUTION INTRAMUSCULAR; INTRAVENOUS at 22:26

## 2023-07-23 RX ADMIN — Medication 10 ML: at 21:00

## 2023-07-23 RX ADMIN — POTASSIUM CHLORIDE 40 MEQ: 1500 TABLET, EXTENDED RELEASE ORAL at 00:29

## 2023-07-23 RX ADMIN — PROCHLORPERAZINE EDISYLATE 5 MG: 5 INJECTION INTRAMUSCULAR; INTRAVENOUS at 13:57

## 2023-07-23 RX ADMIN — THIAMINE HYDROCHLORIDE 200 MG: 100 INJECTION, SOLUTION INTRAMUSCULAR; INTRAVENOUS at 14:50

## 2023-07-23 NOTE — ED PROVIDER NOTES
Subjective   History of Present Illness  Patient is a 52-year-old female with significant past medical history positive for seizure disorder presenting to the ER for alcohol intoxication.  Patient was found by [facility] police passed out in a liquor store.  Police department reports that she was arrested and charged with DUI from [facility] the day prior.  Patient reports that she does drink approximately fifth of liquor daily.  Patient prefers vodka.  Patient reports that she has done that for about 10 years.  Patient reports increased depression after her  cheated on her.  Patient raised from [place] is traveling back to her hometown of [place].  Patient reports that she is just turned to get back there to go to detox.  Patient has reportedly tried several times to detox in the past and has been unsuccessful.  Patient has never been to this facility.  Patient denies chest pain, cough, fever, nausea, vomiting or any additional symptoms at this time.    History provided by:  Patient and police   used: No      Review of Systems   Constitutional: Negative.  Negative for fever.   HENT: Negative.     Respiratory: Negative.     Cardiovascular: Negative.  Negative for chest pain.   Gastrointestinal: Negative.  Negative for abdominal pain.   Endocrine: Negative.    Genitourinary: Negative.  Negative for dysuria.   Skin: Negative.    Neurological: Negative.    Psychiatric/Behavioral:          Alcohol intoxication   All other systems reviewed and are negative.    History reviewed. No pertinent past medical history.    No Known Allergies    History reviewed. No pertinent surgical history.    History reviewed. No pertinent family history.    Social History     Socioeconomic History    Marital status: Single   Tobacco Use    Smoking status: Every Day     Types: Cigarettes   Substance and Sexual Activity    Alcohol use: Yes     Alcohol/week: 20.0 standard drinks     Types: 20 Shots of liquor per  week    Drug use: Never    Sexual activity: Defer           Objective   Physical Exam  Vitals and nursing note reviewed.   Constitutional:       General: She is not in acute distress.     Appearance: She is well-developed. She is ill-appearing. She is not diaphoretic.   HENT:      Head: Normocephalic and atraumatic.      Right Ear: External ear normal.      Left Ear: External ear normal.      Nose: Nose normal.   Eyes:      Conjunctiva/sclera: Conjunctivae normal.      Pupils: Pupils are equal, round, and reactive to light.   Neck:      Vascular: No JVD.      Trachea: No tracheal deviation.   Cardiovascular:      Rate and Rhythm: Normal rate and regular rhythm.      Heart sounds: Normal heart sounds. No murmur heard.  Pulmonary:      Effort: Pulmonary effort is normal. No respiratory distress.      Breath sounds: Normal breath sounds. No wheezing.   Abdominal:      General: Bowel sounds are normal.      Palpations: Abdomen is soft.      Tenderness: There is no abdominal tenderness.   Musculoskeletal:         General: No deformity. Normal range of motion.      Cervical back: Normal range of motion and neck supple.   Skin:     General: Skin is warm and dry.      Capillary Refill: Capillary refill takes 2 to 3 seconds.      Coloration: Skin is not pale.      Findings: No erythema or rash.   Neurological:      Mental Status: She is alert and oriented to person, place, and time.      Cranial Nerves: No cranial nerve deficit.   Psychiatric:         Attention and Perception: She is inattentive.         Mood and Affect: Affect is tearful.         Behavior: Behavior is agitated.         Thought Content: Thought content includes suicidal ideation. Thought content does not include suicidal plan.       Procedures       Results for orders placed or performed during the hospital encounter of 07/22/23   COVID-19 and FLU A/B PCR - Swab, Nasopharynx    Specimen: Nasopharynx; Swab   Result Value Ref Range    COVID19 Not Detected Not  Detected - Ref. Range    Influenza A PCR Not Detected Not Detected    Influenza B PCR Not Detected Not Detected   Comprehensive Metabolic Panel    Specimen: Blood   Result Value Ref Range    Glucose 62 (L) 65 - 99 mg/dL    BUN 15 6 - 20 mg/dL    Creatinine 0.81 0.57 - 1.00 mg/dL    Sodium 139 136 - 145 mmol/L    Potassium 4.3 3.5 - 5.2 mmol/L    Chloride 98 98 - 107 mmol/L    CO2 12.7 (L) 22.0 - 29.0 mmol/L    Calcium 9.0 8.6 - 10.5 mg/dL    Total Protein 7.9 6.0 - 8.5 g/dL    Albumin 4.5 3.5 - 5.2 g/dL    ALT (SGPT) 110 (H) 1 - 33 U/L    AST (SGOT) 267 (H) 1 - 32 U/L    Alkaline Phosphatase 136 (H) 39 - 117 U/L    Total Bilirubin 0.3 0.0 - 1.2 mg/dL    Globulin 3.4 gm/dL    A/G Ratio 1.3 g/dL    BUN/Creatinine Ratio 18.5 7.0 - 25.0    Anion Gap 28.3 (H) 5.0 - 15.0 mmol/L    eGFR 87.5 >60.0 mL/min/1.73   hCG, Serum, Qualitative    Specimen: Blood   Result Value Ref Range    HCG Qualitative Negative Negative   Urinalysis With Culture If Indicated - Straight Cath    Specimen: Straight Cath; Urine   Result Value Ref Range    Color, UA Yellow Yellow, Straw    Appearance, UA Clear Clear    pH, UA 5.5 5.0 - 8.0    Specific Gravity, UA 1.021 1.005 - 1.030    Glucose,  mg/dL (Trace) (A) Negative    Ketones, UA 80 mg/dL (3+) (A) Negative    Bilirubin, UA Negative Negative    Blood, UA Negative Negative    Protein, UA 30 mg/dL (1+) (A) Negative    Leuk Esterase, UA Negative Negative    Nitrite, UA Negative Negative    Urobilinogen, UA 1.0 E.U./dL 0.2 - 1.0 E.U./dL   Acetaminophen Level    Specimen: Blood   Result Value Ref Range    Acetaminophen <5.0 0.0 - 30.0 mcg/mL   Ethanol    Specimen: Blood   Result Value Ref Range    Ethanol 397 (H) 0 - 10 mg/dL    Ethanol % 0.397 %   Urine Drug Screen - Straight Cath    Specimen: Straight Cath; Urine   Result Value Ref Range    THC, Screen, Urine Negative Negative    Phencyclidine (PCP), Urine Negative Negative    Cocaine Screen, Urine Negative Negative    Methamphetamine, Ur  Negative Negative    Opiate Screen Negative Negative    Amphetamine Screen, Urine Negative Negative    Benzodiazepine Screen, Urine Positive (A) Negative    Tricyclic Antidepressants Screen Negative Negative    Methadone Screen, Urine Negative Negative    Barbiturates Screen, Urine Positive (A) Negative    Oxycodone Screen, Urine Negative Negative    Propoxyphene Screen Negative Negative    Buprenorphine, Screen, Urine Negative Negative   Salicylate Level    Specimen: Blood   Result Value Ref Range    Salicylate <0.3 <=30.0 mg/dL   Magnesium    Specimen: Blood   Result Value Ref Range    Magnesium 2.3 1.6 - 2.6 mg/dL   CBC Auto Differential    Specimen: Blood   Result Value Ref Range    WBC 7.78 3.40 - 10.80 10*3/mm3    RBC 4.50 3.77 - 5.28 10*6/mm3    Hemoglobin 11.0 (L) 12.0 - 15.9 g/dL    Hematocrit 36.1 34.0 - 46.6 %    MCV 80.2 79.0 - 97.0 fL    MCH 24.4 (L) 26.6 - 33.0 pg    MCHC 30.5 (L) 31.5 - 35.7 g/dL    RDW 23.1 (H) 12.3 - 15.4 %    RDW-SD 66.0 (H) 37.0 - 54.0 fl    MPV 8.8 6.0 - 12.0 fL    Platelets 453 (H) 140 - 450 10*3/mm3    Neutrophil % 73.3 42.7 - 76.0 %    Lymphocyte % 15.6 (L) 19.6 - 45.3 %    Monocyte % 9.4 5.0 - 12.0 %    Eosinophil % 0.1 (L) 0.3 - 6.2 %    Basophil % 1.3 0.0 - 1.5 %    Immature Grans % 0.3 0.0 - 0.5 %    Neutrophils, Absolute 5.71 1.70 - 7.00 10*3/mm3    Lymphocytes, Absolute 1.21 0.70 - 3.10 10*3/mm3    Monocytes, Absolute 0.73 0.10 - 0.90 10*3/mm3    Eosinophils, Absolute 0.01 0.00 - 0.40 10*3/mm3    Basophils, Absolute 0.10 0.00 - 0.20 10*3/mm3    Immature Grans, Absolute 0.02 0.00 - 0.05 10*3/mm3    nRBC 0.0 0.0 - 0.2 /100 WBC   Scan Slide    Specimen: Blood   Result Value Ref Range    Hypochromia Slight/1+ None Seen    Target Cells Slight/1+ None Seen    Large Platelets Slight/1+ None Seen   Hepatitis Panel, Acute    Specimen: Blood   Result Value Ref Range    Hepatitis B Surface Ag Non-Reactive Non-Reactive    Hep A IgM Non-Reactive Non-Reactive    Hep B C IgM  Non-Reactive Non-Reactive    Hepatitis C Ab Non-Reactive Non-Reactive   Blood Gas, Arterial With Co-Ox    Specimen: Arterial Blood   Result Value Ref Range    Site Left Radial     Rodney's Test Positive     pH, Arterial 7.211 (C) 7.350 - 7.450 pH units    pCO2, Arterial 34.0 (L) 35.0 - 45.0 mm Hg    pO2, Arterial 78.1 (L) 83.0 - 108.0 mm Hg    HCO3, Arterial 13.6 (L) 20.0 - 26.0 mmol/L    Base Excess, Arterial -13.2 (L) 0.0 - 2.0 mmol/L    O2 Saturation, Arterial 90.4 (L) 94.0 - 99.0 %    Hemoglobin, Blood Gas 10.2 (L) 13.5 - 17.5 g/dL    Hematocrit, Blood Gas 31.2 (L) 38.0 - 51.0 %    Oxyhemoglobin 88.4 (L) 94 - 99 %    Methemoglobin 0.70 0.00 - 3.00 %    Carboxyhemoglobin 1.5 0 - 5 %    A-a DO2 25.8 0.0 - 300.0 mmHg    CO2 Content 14.6 (L) 22 - 33 mmol/L    Barometric Pressure for Blood Gas 724 mmHg    Modality Room Air     FIO2 21 %    Ventilator Mode NA     Note      Notified Who DR MAZARIEGOS AND RN     Notified By [id number]     Notified Time 07/22/2023 00:56     Collected by [id number]     pH, Temp Corrected      pCO2, Temperature Corrected      pO2, Temperature Corrected     Acetone    Specimen: Blood   Result Value Ref Range    Acetone Small (C) Negative   Lactic Acid, Plasma    Specimen: Blood   Result Value Ref Range    Lactate 3.9 (C) 0.5 - 2.0 mmol/L   STAT Lactic Acid, Reflex    Specimen: Hand, Right; Blood   Result Value Ref Range    Lactate 3.6 (C) 0.5 - 2.0 mmol/L   Fentanyl, Urine - Straight Cath    Specimen: Straight Cath; Urine   Result Value Ref Range    Fentanyl, Urine Negative Negative   Urinalysis, Microscopic Only - Straight Cath    Specimen: Straight Cath; Urine   Result Value Ref Range    RBC, UA 0-2 None Seen, 0-2 /HPF    WBC, UA 3-5 (A) None Seen, 0-2 /HPF    Bacteria, UA Trace (A) None Seen /HPF    Squamous Epithelial Cells, UA 3-6 (A) None Seen, 0-2 /HPF    Hyaline Casts, UA 0-2 None Seen /LPF    Coarse Granular Casts, UA 7-12 None Seen /LPF    Methodology Manual Light Microscopy    Blood Gas,  Arterial With Co-Ox    Specimen: Arterial Blood   Result Value Ref Range    Site Right Brachial     Rodney's Test N/A     pH, Arterial 7.285 (L) 7.350 - 7.450 pH units    pCO2, Arterial 33.7 (L) 35.0 - 45.0 mm Hg    pO2, Arterial 86.5 83.0 - 108.0 mm Hg    HCO3, Arterial 16.0 (L) 20.0 - 26.0 mmol/L    Base Excess, Arterial -9.8 (L) 0.0 - 2.0 mmol/L    O2 Saturation, Arterial 94.7 94.0 - 99.0 %    Hemoglobin, Blood Gas 9.4 (L) 13.5 - 17.5 g/dL    Hematocrit, Blood Gas 28.9 (L) 38.0 - 51.0 %    Oxyhemoglobin 93.0 (L) 94 - 99 %    Methemoglobin 0.30 0.00 - 3.00 %    Carboxyhemoglobin 1.5 0 - 5 %    A-a DO2 17.8 0.0 - 300.0 mmHg    CO2 Content 17.0 (L) 22 - 33 mmol/L    Barometric Pressure for Blood Gas 725 mmHg    Modality Room Air     FIO2 21 %    Ventilator Mode NA     Note      Notified [facility] VIRGILIO BURNS     Collected by [id number]     pH, Temp Corrected      pCO2, Temperature Corrected      pO2, Temperature Corrected     Ethanol    Specimen: Hand, Right; Blood   Result Value Ref Range    Ethanol 274 (H) 0 - 10 mg/dL    Ethanol % 0.274 %   STAT Lactic Acid, Reflex    Specimen: Blood   Result Value Ref Range    Lactate 3.9 (C) 0.5 - 2.0 mmol/L   Lipase    Specimen: Hand, Right; Blood   Result Value Ref Range    Lipase 24 13 - 60 U/L   Comprehensive Metabolic Panel    Specimen: Blood   Result Value Ref Range    Glucose 82 65 - 99 mg/dL    BUN 9 6 - 20 mg/dL    Creatinine 0.48 (L) 0.57 - 1.00 mg/dL    Sodium 137 136 - 145 mmol/L    Potassium 3.7 3.5 - 5.2 mmol/L    Chloride 97 (L) 98 - 107 mmol/L    CO2 18.1 (L) 22.0 - 29.0 mmol/L    Calcium 7.1 (L) 8.6 - 10.5 mg/dL    Total Protein 5.3 (L) 6.0 - 8.5 g/dL    Albumin 3.5 3.5 - 5.2 g/dL    ALT (SGPT) 88 (H) 1 - 33 U/L    AST (SGOT) 201 (H) 1 - 32 U/L    Alkaline Phosphatase 104 39 - 117 U/L    Total Bilirubin 0.2 0.0 - 1.2 mg/dL    Globulin 1.8 gm/dL    A/G Ratio 1.9 g/dL    BUN/Creatinine Ratio 18.8 7.0 - 25.0    Anion Gap 21.9 (H) 5.0 - 15.0 mmol/L    eGFR 114.1 >60.0  mL/min/1.73   CBC Auto Differential    Specimen: Blood   Result Value Ref Range    WBC 7.92 3.40 - 10.80 10*3/mm3    RBC 3.40 (L) 3.77 - 5.28 10*6/mm3    Hemoglobin 8.4 (L) 12.0 - 15.9 g/dL    Hematocrit 26.3 (L) 34.0 - 46.6 %    MCV 77.4 (L) 79.0 - 97.0 fL    MCH 24.7 (L) 26.6 - 33.0 pg    MCHC 31.9 31.5 - 35.7 g/dL    RDW 22.2 (H) 12.3 - 15.4 %    RDW-SD 61.3 (H) 37.0 - 54.0 fl    MPV 9.1 6.0 - 12.0 fL    Platelets 316 140 - 450 10*3/mm3    Neutrophil % 65.0 42.7 - 76.0 %    Lymphocyte % 19.9 19.6 - 45.3 %    Monocyte % 14.1 (H) 5.0 - 12.0 %    Eosinophil % 0.1 (L) 0.3 - 6.2 %    Basophil % 0.5 0.0 - 1.5 %    Immature Grans % 0.4 0.0 - 0.5 %    Neutrophils, Absolute 5.14 1.70 - 7.00 10*3/mm3    Lymphocytes, Absolute 1.58 0.70 - 3.10 10*3/mm3    Monocytes, Absolute 1.12 (H) 0.10 - 0.90 10*3/mm3    Eosinophils, Absolute 0.01 0.00 - 0.40 10*3/mm3    Basophils, Absolute 0.04 0.00 - 0.20 10*3/mm3    Immature Grans, Absolute 0.03 0.00 - 0.05 10*3/mm3    nRBC 0.0 0.0 - 0.2 /100 WBC   Blood Gas, Arterial With Co-Ox    Specimen: Arterial Blood   Result Value Ref Range    Site Right Radial     Rodney's Test Positive     pH, Arterial 7.404 7.350 - 7.450 pH units    pCO2, Arterial 34.0 (L) 35.0 - 45.0 mm Hg    pO2, Arterial 75.5 (L) 83.0 - 108.0 mm Hg    HCO3, Arterial 21.3 20.0 - 26.0 mmol/L    Base Excess, Arterial -3.0 (L) 0.0 - 2.0 mmol/L    O2 Saturation, Arterial 95.1 94.0 - 99.0 %    Hemoglobin, Blood Gas 8.5 (L) 13.5 - 17.5 g/dL    Hematocrit, Blood Gas 26.1 (L) 38.0 - 51.0 %    Oxyhemoglobin 92.6 (L) 94 - 99 %    Methemoglobin 0.80 0.00 - 3.00 %    Carboxyhemoglobin 1.8 0 - 5 %    A-a DO2 28.6 0.0 - 300.0 mmHg    CO2 Content 22.3 22 - 33 mmol/L    Barometric Pressure for Blood Gas 725 mmHg    Modality Room Air     FIO2 21 %    Ventilator Mode NA     Note      Collected by [id number]     pH, Temp Corrected      pCO2, Temperature Corrected      pO2, Temperature Corrected     STAT Lactic Acid, Reflex    Specimen: Blood    Result Value Ref Range    Lactate 3.4 (C) 0.5 - 2.0 mmol/L   Protime-INR    Specimen: Blood   Result Value Ref Range    Protime 12.9 12.1 - 14.7 Seconds    INR 0.93 0.90 - 1.10   Basic Metabolic Panel    Specimen: Blood   Result Value Ref Range    Glucose 69 65 - 99 mg/dL    BUN 7 6 - 20 mg/dL    Creatinine 0.52 (L) 0.57 - 1.00 mg/dL    Sodium 135 (L) 136 - 145 mmol/L    Potassium 3.3 (L) 3.5 - 5.2 mmol/L    Chloride 97 (L) 98 - 107 mmol/L    CO2 19.2 (L) 22.0 - 29.0 mmol/L    Calcium 7.4 (L) 8.6 - 10.5 mg/dL    BUN/Creatinine Ratio 13.5 7.0 - 25.0    Anion Gap 18.8 (H) 5.0 - 15.0 mmol/L    eGFR 111.9 >60.0 mL/min/1.73   Scan Slide    Specimen: Blood   Result Value Ref Range    Anisocytosis Mod/2+ None Seen    Hypochromia Slight/1+ None Seen    Microcytes Mod/2+ None Seen    Platelet Morphology Normal Normal   STAT Lactic Acid, Reflex    Specimen: Blood   Result Value Ref Range    Lactate 1.1 0.5 - 2.0 mmol/L   Magnesium    Specimen: Blood   Result Value Ref Range    Magnesium 1.6 1.6 - 2.6 mg/dL   Magnesium    Specimen: Blood   Result Value Ref Range    Magnesium 2.7 (H) 1.6 - 2.6 mg/dL   Potassium    Specimen: Blood   Result Value Ref Range    Potassium 4.0 3.5 - 5.2 mmol/L   CBC Auto Differential    Specimen: Blood   Result Value Ref Range    WBC 6.15 3.40 - 10.80 10*3/mm3    RBC 3.56 (L) 3.77 - 5.28 10*6/mm3    Hemoglobin 8.8 (L) 12.0 - 15.9 g/dL    Hematocrit 28.3 (L) 34.0 - 46.6 %    MCV 79.5 79.0 - 97.0 fL    MCH 24.7 (L) 26.6 - 33.0 pg    MCHC 31.1 (L) 31.5 - 35.7 g/dL    RDW 22.5 (H) 12.3 - 15.4 %    RDW-SD 63.4 (H) 37.0 - 54.0 fl    MPV 9.0 6.0 - 12.0 fL    Platelets 298 140 - 450 10*3/mm3    Neutrophil % 61.8 42.7 - 76.0 %    Lymphocyte % 26.2 19.6 - 45.3 %    Monocyte % 10.2 5.0 - 12.0 %    Eosinophil % 1.1 0.3 - 6.2 %    Basophil % 0.5 0.0 - 1.5 %    Immature Grans % 0.2 0.0 - 0.5 %    Neutrophils, Absolute 3.80 1.70 - 7.00 10*3/mm3    Lymphocytes, Absolute 1.61 0.70 - 3.10 10*3/mm3    Monocytes,  Absolute 0.63 0.10 - 0.90 10*3/mm3    Eosinophils, Absolute 0.07 0.00 - 0.40 10*3/mm3    Basophils, Absolute 0.03 0.00 - 0.20 10*3/mm3    Immature Grans, Absolute 0.01 0.00 - 0.05 10*3/mm3    nRBC 0.0 0.0 - 0.2 /100 WBC   Comprehensive Metabolic Panel    Specimen: Blood   Result Value Ref Range    Glucose 93 65 - 99 mg/dL    BUN 5 (L) 6 - 20 mg/dL    Creatinine 0.53 (L) 0.57 - 1.00 mg/dL    Sodium 134 (L) 136 - 145 mmol/L    Potassium 4.0 3.5 - 5.2 mmol/L    Chloride 100 98 - 107 mmol/L    CO2 23.5 22.0 - 29.0 mmol/L    Calcium 8.3 (L) 8.6 - 10.5 mg/dL    Total Protein 5.5 (L) 6.0 - 8.5 g/dL    Albumin 3.3 (L) 3.5 - 5.2 g/dL    ALT (SGPT) 61 (H) 1 - 33 U/L    AST (SGOT) 83 (H) 1 - 32 U/L    Alkaline Phosphatase 104 39 - 117 U/L    Total Bilirubin 0.5 0.0 - 1.2 mg/dL    Globulin 2.2 gm/dL    A/G Ratio 1.5 g/dL    BUN/Creatinine Ratio 9.4 7.0 - 25.0    Anion Gap 10.5 5.0 - 15.0 mmol/L    eGFR 111.4 >60.0 mL/min/1.73   POC Glucose Once    Specimen: Blood   Result Value Ref Range    Glucose 67 (L) 70 - 130 mg/dL   POC Glucose Once    Specimen: Blood   Result Value Ref Range    Glucose 164 (H) 70 - 130 mg/dL   POC Glucose Once    Specimen: Blood   Result Value Ref Range    Glucose 106 70 - 130 mg/dL   POC Glucose Once    Specimen: Blood   Result Value Ref Range    Glucose 59 (L) 70 - 130 mg/dL   POC Glucose Once    Specimen: Blood   Result Value Ref Range    Glucose 160 (H) 70 - 130 mg/dL   POC Glucose Once    Specimen: Blood   Result Value Ref Range    Glucose 61 (L) 70 - 130 mg/dL   POC Glucose Once    Specimen: Blood   Result Value Ref Range    Glucose 180 (H) 70 - 130 mg/dL   POC Glucose Once    Specimen: Blood   Result Value Ref Range    Glucose 97 70 - 130 mg/dL   POC Glucose Once    Specimen: Blood   Result Value Ref Range    Glucose 75 70 - 130 mg/dL   POC Glucose Once    Specimen: Blood   Result Value Ref Range    Glucose 79 70 - 130 mg/dL   POC Glucose Once    Specimen: Blood   Result Value Ref Range    Glucose  174 (H) 70 - 130 mg/dL   POC Glucose Once    Specimen: Blood   Result Value Ref Range    Glucose 48 (C) 70 - 130 mg/dL   POC Glucose Once    Specimen: Blood   Result Value Ref Range    Glucose 242 (H) 70 - 130 mg/dL   POC Glucose Once    Specimen: Blood   Result Value Ref Range    Glucose 101 70 - 130 mg/dL   ECG 12 Lead QT Measurement   Result Value Ref Range    QT Interval 500 ms    QTC Interval 670 ms   Green Top (Gel)   Result Value Ref Range    Extra Tube Hold for add-ons.    Lavender Top   Result Value Ref Range    Extra Tube hold for add-on    Light Blue Top   Result Value Ref Range    Extra Tube Hold for add-ons.       CT Head Without Contrast   Final Result   Negative noncontrast head CT.       This report was finalized on 7/22/2023 2:28 AM by Ysabel Paredes MD.          XR Chest 1 View   Final Result   Mild pulmonary vascular congestion. Subsegmental atelectasis/airspace   disease in the lung bases       This report was finalized on 7/22/2023 1:39 AM by Alex Pallas, DO.               ED Course  ED Course as of 07/23/23 0724   Sat Jul 22, 2023   0034 Glucose(!): 67 [SM]   0056 pH, Arterial(!!): 7.211 [SM]   0058 Lengthy discussion with attending provider Dr. Guzman. Orders placed by him.  [SM]   0117 Acetone(!!): Small [SM]   0117 HCO3, Arterial(!): 13.6 [SM]   0230 XR Chest 1 View [SM]   0230 CT Head Without Contrast [SM]   0347 Lactate(!!): 3.9 [SM]   0417 Barbiturates Screen, Urine(!): Positive [SM]   0417 Benzodiazepine Screen, Urine(!): Positive [SM]   0626 D/W Dr. Courtney agrees to admit  [SM]      ED Course User Index  [SM] Lesa Lamb, MARTIN                                           Medical Decision Making  Problems Addressed:  Alcohol intoxication delirium: complicated acute illness or injury    Amount and/or Complexity of Data Reviewed  Labs: ordered. Decision-making details documented in ED Course.  Radiology: ordered. Decision-making details documented in ED Course.    Risk  Prescription  drug management.  Decision regarding hospitalization.        Final diagnoses:   Alcohol intoxication delirium       ED Disposition  ED Disposition       ED Disposition   Decision to Admit    Condition   --    Comment   Level of Care: Critical Care [6]   Diagnosis: Alcohol intoxication delirium [850522]   Admitting Physician: SABINE EUBANKS [[id number]]   Attending Physician: SABINE EUBANKS [[id number]]   Certification: I Certify That Inpatient Hospital Services Are Medically Necessary For Greater Than 2 Midnights                 No follow-up provider specified.       Medication List      No changes were made to your prescriptions during this visit.            Lesa Lamb, APRN  07/23/23 0724

## 2023-07-23 NOTE — PLAN OF CARE
Goal Outcome Evaluation:  Plan of Care Reviewed With: patient        Progress: no change  Outcome Evaluation: Patient alert but disoriented to situation this shift. RA. NSR. VSS, afebrile. D5W infusing. CIWA protocol in place. Care ongoing.

## 2023-07-23 NOTE — PROGRESS NOTES
[facility] HOSPITALIST PROGRESS NOTE     Patient Identification:  Name:  Lorraine Lincoln  Age:  52 y.o.  Sex:  female  :  1970  MRN:  [id number]  Visit Number:  [id number]  ROOM: [address]     Primary Care Provider:  Provider, No Known    Length of stay in inpatient status:  1    Subjective     Chief Compliant:    Chief Complaint   Patient presents with    Alcohol Intoxication       History of Presenting Illness: Patient seen and evaluated in follow-up for acute alcohol intoxication with delirium with alcohol induced hepatitis with initial suicidal ideation now resolved.  Patient today with beginning clinical signs of some mild withdrawal with some tremors and is currently on CIWA protocol.  Patient more alert and oriented today but still confused at times attempting to ask where her car keys are not and that she is going to leave and drive.    Objective     Current Hospital Meds:  FLUoxetine, 40 mg, Oral, Daily  folic acid (FOLVITE) IVPB, 1 mg, Intravenous, Daily  senna-docusate sodium, 2 tablet, Oral, BID  sodium chloride, 10 mL, Intravenous, Q12H  thiamine (B-1) IV, 200 mg, Intravenous, Q8H   Followed by  [START ON 2023] thiamine, 100 mg, Oral, Daily      dextrose, 75 mL/hr, Last Rate: 75 mL/hr (23 0029)      ----------------------------------------------------------------------------------------------------------------------  Vital Signs:  Temp:  [98.8 °F (37.1 °C)-99.9 °F (37.7 °C)] 99.9 °F (37.7 °C)  Heart Rate:  [] 84  Resp:  [16-27] 17  BP: ()/(55-92) 133/86  SpO2:  [93 %-100 %] 96 %  on   ;   Device (Oxygen Therapy): room air  Body mass index is 28.95 kg/m².      Intake/Output Summary (Last 24 hours) at 2023 1411  Last data filed at 2023 1355  Gross per 24 hour   Intake 1475.05 ml   Output 2575 ml   Net -1099.95 ml      ----------------------------------------------------------------------------------------------------------------------  Physical  exam:  Constitutional:  Well-developed and well-nourished.  No acute distress.      HENT:  Head:  Normocephalic and atraumatic.  Mouth:  Moist mucous membranes.    Eyes:  Conjunctivae and EOM are normal. No scleral icterus.    Cardiovascular:  Normal rate, regular rhythm and normal heart sounds with no murmur.  Pulmonary/Chest:  No respiratory distress, no wheezes, no crackles, with normal breath sounds and good air movement.  Abdominal:  Soft.  Bowel sounds are normal.  No distension and no tenderness.   Musculoskeletal:  No tenderness and no deformity.  No red or swollen joints anywhere.  Functional ROM intact.   Neurological:  Alert and oriented to person, place, and time.  No cranial nerve deficit.  No tongue deviation.  No facial droop.  No slurred speech. Intact Sensation throughout  Skin:  Skin is warm and dry. No rash or lesion noted. No pallor.   Peripheral vascular:  Pulses in all 4 extremities with no clubbing, no cyanosis, no edema.  Psychiatric: Depressed mood, compliant with conversation and exam.   ----------------------------------------------------------------------------------------------------------------------  WBC/HGB/HCT/PLT   6.15/8.8/28.3/298 (07/23 0559)  BUN/CREAT/GLUC/ALT/AST/DYLAN/LIP    5/0.53/93/61/83/--/-- (07/23 0559)  LYTES - Na/K/Cl/CO2: 134*/4.0, 4.0/100/23.5 (07/23 0559)  COAG - PT/INR/PTT: 12.9/0.93/-- (07/22 1705)     No results found for: URINECX  No results found for: BLOODCX    I have personally looked at the labs and they are summarized above.  ----------------------------------------------------------------------------------------------------------------------  Detailed radiology reports for the last 24 hours:  CT Head Without Contrast    Result Date: 7/22/2023  Negative noncontrast head CT.  This report was finalized on 7/22/2023 2:28 AM by Ysabel Paredes MD.      XR Chest 1 View    Result Date: 7/22/2023  Mild pulmonary vascular congestion. Subsegmental  atelectasis/airspace disease in the lung bases  This report was finalized on 7/22/2023 1:39 AM by Alex Pallas, DO.     Assessment & Plan      Acute alcohol intoxication  Chronic alcohol use disorder  Acute alcohol withdrawal, currently mild  Alcohol induced hepatitis    -Patient with delirium on day of admission 7/22 now with progression to withdrawal as she is becoming tremulous at times and still with intermittent episodes of delirium    -Continue CIWA Ativan detox    -Patient counseled on the importance of cessation of alcohol usage as patient has shown any clear documented past history of recurrent alcohol abuse as well as legal issues due to this.    -Patient reportedly found passed out at local local store shortly after being released from prison.    Suicidal ideation, resolved    - previous statements regarding desire to harm herself now resolved    -Seen and evaluated by psychiatry who recommend discontinuation of sitter and suicide precautions as patient voicing these ideations due to marital issues with her 's infidelity but stating to psychiatry that she wants to be safe and has no desire or intent to harm herself now.    -Continue to monitor for any morning clinical signs or symptoms    History of bipolar 1 disorder  History of anxiety/depression    -Documented review of prior records patient does not clearly confirm or deny these past medical history diagnoses.    -Discussed.  Certainly some component of this could be due to really substance abuse with her alcohol rather than from true anxiety or depression however for now cannot completely rule out and will continue with resuming home antidepressant only.    -Psychiatry consulted and following along, appreciate input, patient hopefully may be a potential good candidate for hospitalization in the psychiatric unit for safety    History of hypothyroidism    -Patient does not appear to be currently on levothyroxine, will check TSH levels    Copied  text in portions of the note has been reviewed and is accurate as of 07/23/23    VTE Prophylaxis:   Mechanical Order History:        Ordered        07/22/23 0748  Place Sequential Compression Device  Once            07/22/23 0748  Maintain Sequential Compression Device  Continuous                          Pharmalogical Order History:       None            Disposition pending clinical course and medical stability but likely home withdrawal treatment.    Brant Avila DO  [facility] Hospitalist  07/23/23  14:11 EDT

## 2023-07-23 NOTE — PLAN OF CARE
Goal Outcome Evaluation:           Progress: no change  Outcome Evaluation: Patient A/O. RA. NSR. Pt IVF continue. Appetitie improved. Remains on CIWA, requiring Ativan today.

## 2023-07-24 ENCOUNTER — HOSPITAL ENCOUNTER (INPATIENT)
Facility: HOSPITAL | Age: 53
LOS: 1 days | Discharge: LEFT BEFORE DISCHARGE | DRG: 897 | End: 2023-07-24
Attending: PSYCHIATRY & NEUROLOGY | Admitting: PSYCHIATRY & NEUROLOGY
Payer: MEDICAID

## 2023-07-24 VITALS
BODY MASS INDEX: 29.13 KG/M2 | RESPIRATION RATE: 16 BRPM | HEART RATE: 81 BPM | WEIGHT: 158.29 LBS | TEMPERATURE: 98.1 F | HEIGHT: 62 IN | DIASTOLIC BLOOD PRESSURE: 86 MMHG | OXYGEN SATURATION: 97 % | SYSTOLIC BLOOD PRESSURE: 118 MMHG

## 2023-07-24 VITALS
DIASTOLIC BLOOD PRESSURE: 93 MMHG | HEIGHT: 62 IN | TEMPERATURE: 97.3 F | WEIGHT: 147.4 LBS | BODY MASS INDEX: 27.12 KG/M2 | HEART RATE: 88 BPM | OXYGEN SATURATION: 98 % | RESPIRATION RATE: 18 BRPM | SYSTOLIC BLOOD PRESSURE: 138 MMHG

## 2023-07-24 PROBLEM — F10.939 ALCOHOL WITHDRAWAL: Status: ACTIVE | Noted: 2023-07-24

## 2023-07-24 LAB
ALBUMIN SERPL-MCNC: 3.4 G/DL (ref 3.5–5.2)
ALBUMIN/GLOB SERPL: 1.4 G/DL
ALP SERPL-CCNC: 109 U/L (ref 39–117)
ALT SERPL W P-5'-P-CCNC: 54 U/L (ref 1–33)
ANION GAP SERPL CALCULATED.3IONS-SCNC: 11 MMOL/L (ref 5–15)
AST SERPL-CCNC: 54 U/L (ref 1–32)
BILIRUB SERPL-MCNC: 0.4 MG/DL (ref 0–1.2)
BUN SERPL-MCNC: 3 MG/DL (ref 6–20)
BUN/CREAT SERPL: 6 (ref 7–25)
CALCIUM SPEC-SCNC: 8.8 MG/DL (ref 8.6–10.5)
CHLORIDE SERPL-SCNC: 102 MMOL/L (ref 98–107)
CO2 SERPL-SCNC: 22 MMOL/L (ref 22–29)
CREAT SERPL-MCNC: 0.5 MG/DL (ref 0.57–1)
DEPRECATED RDW RBC AUTO: 64.5 FL (ref 37–54)
EGFRCR SERPLBLD CKD-EPI 2021: 113 ML/MIN/1.73
ERYTHROCYTE [DISTWIDTH] IN BLOOD BY AUTOMATED COUNT: 22.5 % (ref 12.3–15.4)
GLOBULIN UR ELPH-MCNC: 2.4 GM/DL
GLUCOSE BLDC GLUCOMTR-MCNC: 100 MG/DL (ref 70–130)
GLUCOSE BLDC GLUCOMTR-MCNC: 103 MG/DL (ref 70–130)
GLUCOSE BLDC GLUCOMTR-MCNC: 126 MG/DL (ref 70–130)
GLUCOSE SERPL-MCNC: 103 MG/DL (ref 65–99)
HCT VFR BLD AUTO: 30.5 % (ref 34–46.6)
HGB BLD-MCNC: 9.6 G/DL (ref 12–15.9)
MAGNESIUM SERPL-MCNC: 2.3 MG/DL (ref 1.6–2.6)
MCH RBC QN AUTO: 25.1 PG (ref 26.6–33)
MCHC RBC AUTO-ENTMCNC: 31.5 G/DL (ref 31.5–35.7)
MCV RBC AUTO: 79.6 FL (ref 79–97)
PLATELET # BLD AUTO: 323 10*3/MM3 (ref 140–450)
PMV BLD AUTO: 9.5 FL (ref 6–12)
POTASSIUM SERPL-SCNC: 3.8 MMOL/L (ref 3.5–5.2)
PROT SERPL-MCNC: 5.8 G/DL (ref 6–8.5)
RBC # BLD AUTO: 3.83 10*6/MM3 (ref 3.77–5.28)
SODIUM SERPL-SCNC: 135 MMOL/L (ref 136–145)
WBC NRBC COR # BLD: 5.15 10*3/MM3 (ref 3.4–10.8)

## 2023-07-24 PROCEDURE — 85027 COMPLETE CBC AUTOMATED: CPT | Performed by: STUDENT IN AN ORGANIZED HEALTH CARE EDUCATION/TRAINING PROGRAM

## 2023-07-24 PROCEDURE — 25010000002 LORAZEPAM PER 2 MG: Performed by: STUDENT IN AN ORGANIZED HEALTH CARE EDUCATION/TRAINING PROGRAM

## 2023-07-24 PROCEDURE — 94799 UNLISTED PULMONARY SVC/PX: CPT

## 2023-07-24 PROCEDURE — 99239 HOSP IP/OBS DSCHRG MGMT >30: CPT | Performed by: STUDENT IN AN ORGANIZED HEALTH CARE EDUCATION/TRAINING PROGRAM

## 2023-07-24 PROCEDURE — 83735 ASSAY OF MAGNESIUM: CPT | Performed by: STUDENT IN AN ORGANIZED HEALTH CARE EDUCATION/TRAINING PROGRAM

## 2023-07-24 PROCEDURE — 94761 N-INVAS EAR/PLS OXIMETRY MLT: CPT

## 2023-07-24 PROCEDURE — 82948 REAGENT STRIP/BLOOD GLUCOSE: CPT

## 2023-07-24 PROCEDURE — 25010000002 THIAMINE PER 100 MG: Performed by: STUDENT IN AN ORGANIZED HEALTH CARE EDUCATION/TRAINING PROGRAM

## 2023-07-24 PROCEDURE — 80053 COMPREHEN METABOLIC PANEL: CPT | Performed by: STUDENT IN AN ORGANIZED HEALTH CARE EDUCATION/TRAINING PROGRAM

## 2023-07-24 RX ORDER — BENZTROPINE MESYLATE 1 MG/1
2 TABLET ORAL ONCE AS NEEDED
Status: DISCONTINUED | OUTPATIENT
Start: 2023-07-24 | End: 2023-07-24 | Stop reason: HOSPADM

## 2023-07-24 RX ORDER — BENZTROPINE MESYLATE 1 MG/ML
1 INJECTION INTRAMUSCULAR; INTRAVENOUS ONCE AS NEEDED
Status: DISCONTINUED | OUTPATIENT
Start: 2023-07-24 | End: 2023-07-24 | Stop reason: HOSPADM

## 2023-07-24 RX ORDER — IBUPROFEN 400 MG/1
400 TABLET ORAL EVERY 6 HOURS PRN
Status: DISCONTINUED | OUTPATIENT
Start: 2023-07-24 | End: 2023-07-24 | Stop reason: HOSPADM

## 2023-07-24 RX ORDER — LORAZEPAM 1 MG/1
1 TABLET ORAL
Status: DISCONTINUED | OUTPATIENT
Start: 2023-07-24 | End: 2023-07-24 | Stop reason: HOSPADM

## 2023-07-24 RX ORDER — LOPERAMIDE HYDROCHLORIDE 2 MG/1
2 CAPSULE ORAL
Status: DISCONTINUED | OUTPATIENT
Start: 2023-07-24 | End: 2023-07-24 | Stop reason: HOSPADM

## 2023-07-24 RX ORDER — BENZONATATE 100 MG/1
100 CAPSULE ORAL 3 TIMES DAILY PRN
Status: DISCONTINUED | OUTPATIENT
Start: 2023-07-24 | End: 2023-07-24 | Stop reason: HOSPADM

## 2023-07-24 RX ORDER — MULTIPLE VITAMINS W/ MINERALS TAB 9MG-400MCG
1 TAB ORAL DAILY
Status: DISCONTINUED | OUTPATIENT
Start: 2023-07-24 | End: 2023-07-24 | Stop reason: HOSPADM

## 2023-07-24 RX ORDER — FAMOTIDINE 20 MG/1
20 TABLET, FILM COATED ORAL 2 TIMES DAILY PRN
Status: DISCONTINUED | OUTPATIENT
Start: 2023-07-24 | End: 2023-07-24 | Stop reason: HOSPADM

## 2023-07-24 RX ORDER — LANOLIN ALCOHOL/MO/W.PET/CERES
100 CREAM (GRAM) TOPICAL DAILY
Qty: 30 TABLET | Status: ON HOLD
Start: 2023-07-27 | End: 2023-07-24

## 2023-07-24 RX ORDER — LORAZEPAM 0.5 MG/1
0.5 TABLET ORAL
Status: DISCONTINUED | OUTPATIENT
Start: 2023-07-25 | End: 2023-07-24 | Stop reason: HOSPADM

## 2023-07-24 RX ORDER — GABAPENTIN 300 MG/1
600 CAPSULE ORAL 2 TIMES DAILY
COMMUNITY
End: 2023-07-24 | Stop reason: HOSPADM

## 2023-07-24 RX ORDER — LAMOTRIGINE 200 MG/1
200 TABLET ORAL DAILY
COMMUNITY
End: 2023-07-24 | Stop reason: HOSPADM

## 2023-07-24 RX ORDER — PROPRANOLOL HYDROCHLORIDE 20 MG/1
20 TABLET ORAL 3 TIMES DAILY
COMMUNITY
End: 2023-07-24 | Stop reason: HOSPADM

## 2023-07-24 RX ORDER — TRAZODONE HYDROCHLORIDE 50 MG/1
50 TABLET ORAL NIGHTLY PRN
Status: DISCONTINUED | OUTPATIENT
Start: 2023-07-24 | End: 2023-07-24 | Stop reason: HOSPADM

## 2023-07-24 RX ORDER — FOLIC ACID 1 MG/1
1 TABLET ORAL DAILY
Status: ON HOLD
Start: 2023-07-24 | End: 2023-07-24

## 2023-07-24 RX ORDER — LORAZEPAM 1 MG/1
1 TABLET ORAL EVERY 4 HOURS PRN
Status: DISCONTINUED | OUTPATIENT
Start: 2023-07-25 | End: 2023-07-24 | Stop reason: HOSPADM

## 2023-07-24 RX ORDER — ONDANSETRON 4 MG/1
4 TABLET, FILM COATED ORAL EVERY 6 HOURS PRN
Status: DISCONTINUED | OUTPATIENT
Start: 2023-07-24 | End: 2023-07-24 | Stop reason: HOSPADM

## 2023-07-24 RX ORDER — LORAZEPAM 0.5 MG/1
0.5 TABLET ORAL EVERY 4 HOURS PRN
Status: DISCONTINUED | OUTPATIENT
Start: 2023-07-26 | End: 2023-07-24 | Stop reason: HOSPADM

## 2023-07-24 RX ORDER — ALUMINA, MAGNESIA, AND SIMETHICONE 2400; 2400; 240 MG/30ML; MG/30ML; MG/30ML
15 SUSPENSION ORAL EVERY 6 HOURS PRN
Status: DISCONTINUED | OUTPATIENT
Start: 2023-07-24 | End: 2023-07-24 | Stop reason: HOSPADM

## 2023-07-24 RX ORDER — TRAZODONE HYDROCHLORIDE 50 MG/1
50 TABLET ORAL NIGHTLY
COMMUNITY
End: 2023-07-24 | Stop reason: HOSPADM

## 2023-07-24 RX ORDER — ACETAMINOPHEN 325 MG/1
650 TABLET ORAL EVERY 6 HOURS PRN
Status: DISCONTINUED | OUTPATIENT
Start: 2023-07-24 | End: 2023-07-24 | Stop reason: HOSPADM

## 2023-07-24 RX ORDER — MULTIVITAMIN WITH IRON
2 TABLET ORAL DAILY
Status: DISCONTINUED | OUTPATIENT
Start: 2023-07-24 | End: 2023-07-24 | Stop reason: HOSPADM

## 2023-07-24 RX ORDER — HYDROXYZINE 50 MG/1
50 TABLET, FILM COATED ORAL EVERY 6 HOURS PRN
Status: DISCONTINUED | OUTPATIENT
Start: 2023-07-24 | End: 2023-07-24 | Stop reason: HOSPADM

## 2023-07-24 RX ORDER — ECHINACEA PURPUREA EXTRACT 125 MG
2 TABLET ORAL AS NEEDED
Status: DISCONTINUED | OUTPATIENT
Start: 2023-07-24 | End: 2023-07-24 | Stop reason: HOSPADM

## 2023-07-24 RX ORDER — LURASIDONE HYDROCHLORIDE 80 MG/1
80 TABLET, FILM COATED ORAL DAILY
COMMUNITY
End: 2023-07-24 | Stop reason: HOSPADM

## 2023-07-24 RX ADMIN — LORAZEPAM 2 MG: 2 INJECTION INTRAMUSCULAR; INTRAVENOUS at 12:45

## 2023-07-24 RX ADMIN — FLUOXETINE HYDROCHLORIDE 40 MG: 20 CAPSULE ORAL at 08:40

## 2023-07-24 RX ADMIN — THIAMINE HYDROCHLORIDE 200 MG: 100 INJECTION, SOLUTION INTRAMUSCULAR; INTRAVENOUS at 06:05

## 2023-07-24 RX ADMIN — DEXTROSE MONOHYDRATE 75 ML/HR: 50 INJECTION, SOLUTION INTRAVENOUS at 06:05

## 2023-07-24 RX ADMIN — Medication 10 ML: at 10:18

## 2023-07-24 RX ADMIN — FOLIC ACID 1 MG: 5 INJECTION, SOLUTION INTRAMUSCULAR; INTRAVENOUS; SUBCUTANEOUS at 08:40

## 2023-07-24 RX ADMIN — DOCUSATE SODIUM 50 MG AND SENNOSIDES 8.6 MG 2 TABLET: 8.6; 5 TABLET, FILM COATED ORAL at 08:40

## 2023-07-24 NOTE — NURSING NOTE
Spoke to Dr. Goyal regarding patient request to leave AMA. Dr. Goyal approved discharge. Patient adamantly denied SI/HI/AVH.

## 2023-07-24 NOTE — PAYOR COMM NOTE
"[facility]  MEGAN FRANZ  PHONE  [phone]  FAX  [phone]  NPI:  [id number]    REQUEST FOR INPATIENT AUTH    Lorraine Lincoln (52 y.o. Female)       Date of Birth   1970    Social Security Number       Address   [address] [place] [address]    Home Phone   [phone]    MRN   [id number]       Spiritism   None    Marital Status   Single                            Admission Date   7/22/23    Admission Type   Emergency    Admitting Provider   Ervin Courtney MD    Attending Provider   Brant Avila DO    Department, Room/Bed   [facility], CC03/       Discharge Date       Discharge Disposition       Discharge Destination                                 Attending Provider: Brant Avila DO    Allergies: No Known Allergies    Isolation: None   Infection: None   Code Status: CPR    Ht: 157.5 cm (62\")   Wt: 71.8 kg (158 lb 4.6 oz)    Admission Cmt: None   Principal Problem: Alcohol intoxication delirium [F10.121]                   Active Insurance as of 7/22/2023       Primary Coverage       Payor Plan Insurance Group Employer/Plan Group    MEDICAID OUT OF STATE MISC MEDICAID OUT OF STATE        Coverage Address Coverage Phone Number Coverage Fax Number Effective Dates    [address] [facility] [phone]  10/1/2021 - None Entered    [facility] [id number]         Subscriber Name Subscriber Birth Date Member ID       LORRAINE LINCOLN 1970 [id number]                     Emergency Contacts            No emergency contacts on file.                 History & Physical        Misael Gabriel PA-C at 07/22/23 0902       Attestation signed by Brant Avila DO at 07/22/23 1745    Patient seen and examined separately.  Patient somnolent but arousable at time of examination and is oriented to person and knows she is in the hospital but not specifically which hospital.  Patient not able to provide a lot of history other than stating that she is leaving her  " who lives in [place] and is on her way to meet her sister.  Review of records available to me through EMR shows patient with multiple visits since May of this year to many emergency departments both here and in [place] with acute intoxication from alcohol.  Patient also with recent DUI.  Patient carries reported diagnoses of depression and anxiety and bipolar 1 disorder.  Psychiatry is seen and evaluated in I have discussed with Dr. Welch and for now we will hold off on starting any additional medications until med rec completed.  Which was completed later today and will initiate on patient's fluoxetine but hold other psychiatric medications for now.  Patient with long chronic history appears of alcohol abuse and while she is doing well today suspect that she will have likely worsening withdrawal symptoms tomorrow.  Continue CIWA protocol and CCU admission status at this time given high risk for severe alcohol withdrawal as patient drinks fifth of liquor a day that she admits to.  Patient no longer having any suicidal ideation and cleared by psychiatry for removal of suicide precautions and sitter.    I have reviewed this documentation and agree.                      [facility]  History & Physical    Patient Identification:  Name:  Lorraine Lincoln  Age:  52 y.o.  Sex:  female  :  1970  MRN:  [id number]   Visit Number:  [id number]  Admit Date: 2023   Primary Care Physician:  Provider, No Known    Subjective     Chief complaint: alcohol intoxication    History of presenting illness:      Lorraine Lincoln is a 52 y.o. female with past medical history significant for reported history seizure disorder    Per verbal hand off from ED patient presented after recent DUI. Shortly after release from prison patient reportedly passed out at a local liquor store. Per review of ED documentation patient reported she drank a fifth of whiskey the night of admission. She was  "also reporting that she wanted to die while in the ED.     Patient was seen and examined with nursing staff at the bedside. She reports ongoing alcohol abuse for \"years.\" She drinks a fifth of liquor daily. She states she has been to rehab multiple times in the past, never to this facility. She denies history of liver dysfunction but states her enzymes have been elevated in the past. She reports she cannot recall events leading to her hospitalization. She reports is currently nauseous and has N/V very often. Never bloody. She denies shortness of breath or cough. Was placed on 2L in the ED initially though seen on room air saturating well. She denies abdominal pain or diarrhea. She denies dysuria or difficulty with urination currently though notes treated over the past week with bactrim for UTI. She reports history depressed mood and wishes to die daily since discovering her  was unfaithful. She is unable to approximate the amount of time she's been feeling this way. She denies history of suicide attempt or desire to end her life currently. She repeatedly states \"there's no hope for me.\" She reports her past medical history is significant for seizure disorder and she takes lamictal at home. Reports last seizure was \"recent.\" Also reports hypothyroidism and takes synthroid. She states her home medication list is extensive but these are the only two disorders she can recall currently. She denies cardiac history but states she often has chest pains and palpitations.     Upon arrival to the ED, vital signs were temp 98.4, , RR 14, /79, SpO2 88% on RA. Now on 2L NC with sats mid to upper 90s.  ABG in the ED significant for acidosis at 7.211.  Improved on most recent repeat to 7.404.  Chemistry panel significant for anion gap elevated at 28.3, AST and ALT elevated at 267 and 110 respectively.  Acetone detected, lactate elevated at 3.9 and down trended to 3.6.  UA notable for trace glucose, 3+ ketones, 1+ " protein, 3-5 WBCs, trace bacteria and 3-6 squamous epithelial cells.  Ethanol level 397 on arrival, has trended down to 274 on last repeat.  Toxicology screen positive for barbiturates and benzodiazepine.  CXR notable for mild pulmonary vascular congestion subsegmental atelectasis/airspace disease in the lung bases.  CT head is negative.    Known Emergency Department medications received prior to my evaluation included D50W x3, folic acid, zofran, sodium bicarb x6, normal saline boluses total 3L, thiamine.   Room location at the time of my evaluation was CCU 3.     ---------------------------------------------------------------------------------------------------------------------   Review of Systems   Constitutional:  Negative for chills and fever.   HENT:  Negative for congestion and rhinorrhea.    Respiratory:  Negative for cough and shortness of breath.    Cardiovascular:  Negative for chest pain, palpitations and leg swelling.   Gastrointestinal:  Positive for nausea and vomiting. Negative for abdominal pain, constipation and diarrhea.   Genitourinary:  Negative for difficulty urinating and dysuria.   Musculoskeletal:  Negative for arthralgias, back pain and myalgias.   Skin:  Negative for rash and wound.   Neurological:  Negative for dizziness and light-headedness.   Psychiatric/Behavioral:  Positive for dysphoric mood and suicidal ideas.       ---------------------------------------------------------------------------------------------------------------------   History reviewed. No pertinent past medical history.  History reviewed. No pertinent surgical history.  History reviewed. No pertinent family history.  Social History     Socioeconomic History    Marital status: Single     ---------------------------------------------------------------------------------------------------------------------   Allergies:  Patient has no known  allergies.  ---------------------------------------------------------------------------------------------------------------------   Home medications:    Medications below are reported home medications pulling from within the system; at this time, these medications have not been reconciled unless otherwise specified and are in the verification process for further verifcation as current home medications.  No medications prior to admission.       Hospital Scheduled Meds:  folic acid (FOLVITE) IVPB, 1 mg, Intravenous, Daily  senna-docusate sodium, 2 tablet, Oral, BID  sodium chloride, 10 mL, Intravenous, Q12H  IV Fluids 1000 mL + additives, 100 mL/hr, Intravenous, Daily  thiamine (B-1) IV, 200 mg, Intravenous, Q8H   Followed by  [START ON 7/27/2023] thiamine, 100 mg, Oral, Daily      sodium chloride, 100 mL/hr        Current listed hospital scheduled medications may not yet reflect those currently placed in orders that are signed and held awaiting patient's arrival to floor.   ---------------------------------------------------------------------------------------------------------------------     Objective     Vital Signs:  Temp:  [98.4 °F (36.9 °C)] 98.4 °F (36.9 °C)  Heart Rate:  [] 94  Resp:  [14] 14  BP: (110-119)/(70-79) 110/70      07/22/23  0011   Weight: 68 kg (150 lb)     Body mass index is 26.57 kg/m².  ---------------------------------------------------------------------------------------------------------------------       Physical Exam  Vitals and nursing note reviewed.   Constitutional:       General: She is not in acute distress.  HENT:      Head: Normocephalic and atraumatic.   Eyes:      Extraocular Movements: Extraocular movements intact.      Conjunctiva/sclera: Conjunctivae normal.   Cardiovascular:      Rate and Rhythm: Normal rate and regular rhythm.   Pulmonary:      Effort: Pulmonary effort is normal.      Breath sounds: Normal breath sounds.   Abdominal:      Palpations: Abdomen is soft.       Tenderness: There is no abdominal tenderness.   Musculoskeletal:      Right lower leg: No edema.      Left lower leg: No edema.   Skin:     General: Skin is warm and dry.   Neurological:      Mental Status: She is alert.   Psychiatric:         Mood and Affect: Mood is depressed.         Behavior: Behavior is cooperative.             ---------------------------------------------------------------------------------------------------------------------  EKG:      ---------------------------------------------------------------------------------------------------------------------   Results from last 7 days   Lab Units 07/22/23  0536 07/22/23  0155 07/22/23  0030   LACTATE mmol/L 3.6* 3.9*  --    WBC 10*3/mm3  --   --  7.78   HEMOGLOBIN g/dL  --   --  11.0*   HEMATOCRIT %  --   --  36.1   MCV fL  --   --  80.2   MCHC g/dL  --   --  30.5*   PLATELETS 10*3/mm3  --   --  453*     Results from last 7 days   Lab Units 07/22/23  0850 07/22/23  0524 07/22/23  0051   PH, ARTERIAL pH units 7.404 7.285* 7.211*   PO2 ART mm Hg 75.5* 86.5 78.1*   PCO2, ARTERIAL mm Hg 34.0* 33.7* 34.0*   HCO3 ART mmol/L 21.3 16.0* 13.6*     Results from last 7 days   Lab Units 07/22/23  0030   SODIUM mmol/L 139   POTASSIUM mmol/L 4.3   MAGNESIUM mg/dL 2.3   CHLORIDE mmol/L 98   CO2 mmol/L 12.7*   BUN mg/dL 15   CREATININE mg/dL 0.81   CALCIUM mg/dL 9.0   GLUCOSE mg/dL 62*   ALBUMIN g/dL 4.5   BILIRUBIN mg/dL 0.3   ALK PHOS U/L 136*   AST (SGOT) U/L 267*   ALT (SGPT) U/L 110*   Estimated Creatinine Clearance: 75.2 mL/min (by C-G formula based on SCr of 0.81 mg/dL).  No results found for: AMMONIA          No results found for: HGBA1C  Lab Results   Component Value Date    TSH 70.500 05/05/2020    FREET4 0.6 (L) 05/05/2020     Lab Results   Component Value Date    PREGTESTUR Negative 05/05/2020     Pain Management Panel          Latest Ref Rng & Units 7/22/2023   Pain Management Panel   Amphetamine, Urine Qual Negative Negative    Barbiturates  Screen, Urine Negative Positive    Benzodiazepine Screen, Urine Negative Positive    Buprenorphine, Screen, Urine Negative Negative    Cocaine Screen, Urine Negative Negative    Fentanyl, Urine Negative Negative    Methadone Screen , Urine Negative Negative    Methamphetamine, Ur Negative Negative      No results found for: BLOODCX  No results found for: URINECX  No results found for: WOUNDCX  No results found for: STOOLCX      ---------------------------------------------------------------------------------------------------------------------  Imaging Results (Last 7 Days)       Procedure Component Value Units Date/Time    CT Head Without Contrast [[id number]] Collected: 07/22/23 0224     Updated: 07/22/23 0230    Narrative:      CT HEAD WO CONTRAST-     REASON FOR EXAM:  confusion, found down, intoxication     TECHNIQUE: Noncontrast 3 mm axial sections with sagittal and coronal  reformats.     COMPARISON: No prior studies available for comparison at this time.     FINDINGS: There is no skull fracture. There is no acute intracranial  hemorrhage. There is no extra-axial mass or fluid collection. There is  no intra-axial mass. There is no mass effect or shift of midline  structures. The ventricles are normal in size. There are no foci of  abnormal attenuation within the brain. The mastoid air cells and middle  ear cavities are clear. The visualized paranasal sinuses are clear.       Impression:      Negative noncontrast head CT.     This report was finalized on 7/22/2023 2:28 AM by Ysabel Paredes MD.       XR Chest 1 View [[id number]] Collected: 07/22/23 0139     Updated: 07/22/23 0142    Narrative:      INDICATION: Cough     TECHNIQUE: Frontal radiograph of the chest.     COMPARISON: None.     FINDINGS:   Cardiomegaly. Mild pulmonary vascular congestion. Subsegmental  atelectasis/airspace disease in the lung bases. No pleural effusion or  pneumothorax.  No acute fracture.       Impression:      Mild pulmonary  vascular congestion. Subsegmental atelectasis/airspace  disease in the lung bases     This report was finalized on 7/22/2023 1:39 AM by Alex Pallas, DO.               Cultures:  No results found for: BLOODCX, URINECX, WOUNDCX, MRSACX, RESPCX, STOOLCX    Last echocardiogram:          I have personally reviewed the above radiology images and read the final radiology report on 07/22/23  ---------------------------------------------------------------------------------------------------------------------  Assessment / Plan     Active Hospital Problems    Diagnosis  POA    **Alcohol intoxication delirium [F10.121]  Yes       ASSESSMENT/PLAN:    Alcohol intoxication delirium, POA  Elevated anion gap metabolic acidosis, POA  Transaminitis, POA, likely 2/2 alcohol abuse  Suicidal Ideation, POA  Patient presents after being brought to the ED, intoxicated. Reports longstanding history of alcohol abuse. Drinks a fifth of liquor daily.   Anion gap in the ED 28.3.  Initial ABG with pH 7.2.  Has improved following bicarb in the ED to 7.4 on most recent repeat.  Also presented with hypoglycemia, improved following D50.  Lactate 3.9 on arrival has trended down to 3.6 following fluid replacement, total of 3 L thus far.  Ethanol level on arrival 397, trended down to 274 around 6 AM.  Toxicology screen positive for barbiturates and benzodiazepines.  CXR read as mild pulmonary vascular congestion with subsegmental atelectasis/airspace disease in the bases.  Patient was hypoxic at 88% on arrival and placed on 2 L per nasal cannula though now weaned to room air and saturating appropriately.  No leukocytosis, no fever.  Lungs are clear to auscultation bilaterally on exam.  CT head in the ED was negative for acute change.  She was admitted to the CCU for further management.  Continue to monitor with continuous pulse oximetry/cardiac monitoring.  Initiate and follow CIWA protocol.  Replace B vitamins.  Continue to replace fluids at 100 mL/h  normal saline.  Monitor and replace electrolytes as needed.  N.p.o. for now  Safety, seizure and suicide precautions in place.  Sitter placed at the bedside.  Repeat CBC, CMP.  Trend lactic acid.  Consult psychiatry for further assistance, appreciated.  Continue supportive care    Chronic:  Reported seizure disorder  Reported hypothyroidism  Resume home medications as indicated as med rec is available.  Monitor vital signs per protocol  ----------  -DVT prophylaxis: SCDs  -Activity: Advance per guidelines  -Expected length of stay: INPATIENT status due to the need for care which can only be reasonably provided in an hospital setting such as aggressive/expedited ancillary services and/or consultation services, the necessity for IV medications, close physician monitoring and/or the possible need for procedures.  In such, I feel patient’s risk for adverse outcomes and need for care warrant INPATIENT evaluation and predict the patient’s care encounter to likely last beyond 2 midnights.   -Disposition pending course    High risk secondary to alcohol intoxication, history of abuse with dependence    Code Status and Medical Interventions:   Ordered at: 07/22/23 0630     Code Status (Patient has no pulse and is not breathing):    CPR (Attempt to Resuscitate)     Medical Interventions (Patient has pulse or is breathing):    Full Support     Release to patient:    Routine Release       Misael Gabriel PA-C   07/22/23  09:03 EDT     Electronically signed by Brant Avila DO at 07/22/23 1748          Emergency Department Notes        Lesa Lamb, MARTIN at 07/23/23 0720       Attestation signed by Thomas May MD at 07/23/23 2032        SUPERVISE: For this patient encounter, I reviewed the APC's documentation, treatment plan, and medical decision making.  Thomas May MD 7/23/2023 20:32 EDT                         Subjective   History of Present Illness  Patient is a 52-year-old female with significant past  medical history positive for seizure disorder presenting to the ER for alcohol intoxication.  Patient was found by [facility] police passed out in a liquor store.  Police department reports that she was arrested and charged with DUI from [facility] the day prior.  Patient reports that she does drink approximately fifth of liquor daily.  Patient prefers vodka.  Patient reports that she has done that for about 10 years.  Patient reports increased depression after her  cheated on her.  Patient raised from [place] is traveling back to her hometown of [place].  Patient reports that she is just turned to get back there to go to detox.  Patient has reportedly tried several times to detox in the past and has been unsuccessful.  Patient has never been to this facility.  Patient denies chest pain, cough, fever, nausea, vomiting or any additional symptoms at this time.    History provided by:  Patient and police   used: No      Review of Systems   Constitutional: Negative.  Negative for fever.   HENT: Negative.     Respiratory: Negative.     Cardiovascular: Negative.  Negative for chest pain.   Gastrointestinal: Negative.  Negative for abdominal pain.   Endocrine: Negative.    Genitourinary: Negative.  Negative for dysuria.   Skin: Negative.    Neurological: Negative.    Psychiatric/Behavioral:          Alcohol intoxication   All other systems reviewed and are negative.    History reviewed. No pertinent past medical history.    No Known Allergies    History reviewed. No pertinent surgical history.    History reviewed. No pertinent family history.    Social History     Socioeconomic History    Marital status: Single   Tobacco Use    Smoking status: Every Day     Types: Cigarettes   Substance and Sexual Activity    Alcohol use: Yes     Alcohol/week: 20.0 standard drinks     Types: 20 Shots of liquor per week    Drug use: Never    Sexual activity: Defer           Objective   Physical Exam  Vitals  and nursing note reviewed.   Constitutional:       General: She is not in acute distress.     Appearance: She is well-developed. She is ill-appearing. She is not diaphoretic.   HENT:      Head: Normocephalic and atraumatic.      Right Ear: External ear normal.      Left Ear: External ear normal.      Nose: Nose normal.   Eyes:      Conjunctiva/sclera: Conjunctivae normal.      Pupils: Pupils are equal, round, and reactive to light.   Neck:      Vascular: No JVD.      Trachea: No tracheal deviation.   Cardiovascular:      Rate and Rhythm: Normal rate and regular rhythm.      Heart sounds: Normal heart sounds. No murmur heard.  Pulmonary:      Effort: Pulmonary effort is normal. No respiratory distress.      Breath sounds: Normal breath sounds. No wheezing.   Abdominal:      General: Bowel sounds are normal.      Palpations: Abdomen is soft.      Tenderness: There is no abdominal tenderness.   Musculoskeletal:         General: No deformity. Normal range of motion.      Cervical back: Normal range of motion and neck supple.   Skin:     General: Skin is warm and dry.      Capillary Refill: Capillary refill takes 2 to 3 seconds.      Coloration: Skin is not pale.      Findings: No erythema or rash.   Neurological:      Mental Status: She is alert and oriented to person, place, and time.      Cranial Nerves: No cranial nerve deficit.   Psychiatric:         Attention and Perception: She is inattentive.         Mood and Affect: Affect is tearful.         Behavior: Behavior is agitated.         Thought Content: Thought content includes suicidal ideation. Thought content does not include suicidal plan.       Procedures      Results for orders placed or performed during the hospital encounter of 07/22/23   COVID-19 and FLU A/B PCR - Swab, Nasopharynx    Specimen: Nasopharynx; Swab   Result Value Ref Range    COVID19 Not Detected Not Detected - Ref. Range    Influenza A PCR Not Detected Not Detected    Influenza B PCR Not  Detected Not Detected   Comprehensive Metabolic Panel    Specimen: Blood   Result Value Ref Range    Glucose 62 (L) 65 - 99 mg/dL    BUN 15 6 - 20 mg/dL    Creatinine 0.81 0.57 - 1.00 mg/dL    Sodium 139 136 - 145 mmol/L    Potassium 4.3 3.5 - 5.2 mmol/L    Chloride 98 98 - 107 mmol/L    CO2 12.7 (L) 22.0 - 29.0 mmol/L    Calcium 9.0 8.6 - 10.5 mg/dL    Total Protein 7.9 6.0 - 8.5 g/dL    Albumin 4.5 3.5 - 5.2 g/dL    ALT (SGPT) 110 (H) 1 - 33 U/L    AST (SGOT) 267 (H) 1 - 32 U/L    Alkaline Phosphatase 136 (H) 39 - 117 U/L    Total Bilirubin 0.3 0.0 - 1.2 mg/dL    Globulin 3.4 gm/dL    A/G Ratio 1.3 g/dL    BUN/Creatinine Ratio 18.5 7.0 - 25.0    Anion Gap 28.3 (H) 5.0 - 15.0 mmol/L    eGFR 87.5 >60.0 mL/min/1.73   hCG, Serum, Qualitative    Specimen: Blood   Result Value Ref Range    HCG Qualitative Negative Negative   Urinalysis With Culture If Indicated - Straight Cath    Specimen: Straight Cath; Urine   Result Value Ref Range    Color, UA Yellow Yellow, Straw    Appearance, UA Clear Clear    pH, UA 5.5 5.0 - 8.0    Specific Gravity, UA 1.021 1.005 - 1.030    Glucose,  mg/dL (Trace) (A) Negative    Ketones, UA 80 mg/dL (3+) (A) Negative    Bilirubin, UA Negative Negative    Blood, UA Negative Negative    Protein, UA 30 mg/dL (1+) (A) Negative    Leuk Esterase, UA Negative Negative    Nitrite, UA Negative Negative    Urobilinogen, UA 1.0 E.U./dL 0.2 - 1.0 E.U./dL   Acetaminophen Level    Specimen: Blood   Result Value Ref Range    Acetaminophen <5.0 0.0 - 30.0 mcg/mL   Ethanol    Specimen: Blood   Result Value Ref Range    Ethanol 397 (H) 0 - 10 mg/dL    Ethanol % 0.397 %   Urine Drug Screen - Straight Cath    Specimen: Straight Cath; Urine   Result Value Ref Range    THC, Screen, Urine Negative Negative    Phencyclidine (PCP), Urine Negative Negative    Cocaine Screen, Urine Negative Negative    Methamphetamine, Ur Negative Negative    Opiate Screen Negative Negative    Amphetamine Screen, Urine Negative  Negative    Benzodiazepine Screen, Urine Positive (A) Negative    Tricyclic Antidepressants Screen Negative Negative    Methadone Screen, Urine Negative Negative    Barbiturates Screen, Urine Positive (A) Negative    Oxycodone Screen, Urine Negative Negative    Propoxyphene Screen Negative Negative    Buprenorphine, Screen, Urine Negative Negative   Salicylate Level    Specimen: Blood   Result Value Ref Range    Salicylate <0.3 <=30.0 mg/dL   Magnesium    Specimen: Blood   Result Value Ref Range    Magnesium 2.3 1.6 - 2.6 mg/dL   CBC Auto Differential    Specimen: Blood   Result Value Ref Range    WBC 7.78 3.40 - 10.80 10*3/mm3    RBC 4.50 3.77 - 5.28 10*6/mm3    Hemoglobin 11.0 (L) 12.0 - 15.9 g/dL    Hematocrit 36.1 34.0 - 46.6 %    MCV 80.2 79.0 - 97.0 fL    MCH 24.4 (L) 26.6 - 33.0 pg    MCHC 30.5 (L) 31.5 - 35.7 g/dL    RDW 23.1 (H) 12.3 - 15.4 %    RDW-SD 66.0 (H) 37.0 - 54.0 fl    MPV 8.8 6.0 - 12.0 fL    Platelets 453 (H) 140 - 450 10*3/mm3    Neutrophil % 73.3 42.7 - 76.0 %    Lymphocyte % 15.6 (L) 19.6 - 45.3 %    Monocyte % 9.4 5.0 - 12.0 %    Eosinophil % 0.1 (L) 0.3 - 6.2 %    Basophil % 1.3 0.0 - 1.5 %    Immature Grans % 0.3 0.0 - 0.5 %    Neutrophils, Absolute 5.71 1.70 - 7.00 10*3/mm3    Lymphocytes, Absolute 1.21 0.70 - 3.10 10*3/mm3    Monocytes, Absolute 0.73 0.10 - 0.90 10*3/mm3    Eosinophils, Absolute 0.01 0.00 - 0.40 10*3/mm3    Basophils, Absolute 0.10 0.00 - 0.20 10*3/mm3    Immature Grans, Absolute 0.02 0.00 - 0.05 10*3/mm3    nRBC 0.0 0.0 - 0.2 /100 WBC   Scan Slide    Specimen: Blood   Result Value Ref Range    Hypochromia Slight/1+ None Seen    Target Cells Slight/1+ None Seen    Large Platelets Slight/1+ None Seen   Hepatitis Panel, Acute    Specimen: Blood   Result Value Ref Range    Hepatitis B Surface Ag Non-Reactive Non-Reactive    Hep A IgM Non-Reactive Non-Reactive    Hep B C IgM Non-Reactive Non-Reactive    Hepatitis C Ab Non-Reactive Non-Reactive   Blood Gas, Arterial With  Co-Ox    Specimen: Arterial Blood   Result Value Ref Range    Site Left Radial     Rodney's Test Positive     pH, Arterial 7.211 (C) 7.350 - 7.450 pH units    pCO2, Arterial 34.0 (L) 35.0 - 45.0 mm Hg    pO2, Arterial 78.1 (L) 83.0 - 108.0 mm Hg    HCO3, Arterial 13.6 (L) 20.0 - 26.0 mmol/L    Base Excess, Arterial -13.2 (L) 0.0 - 2.0 mmol/L    O2 Saturation, Arterial 90.4 (L) 94.0 - 99.0 %    Hemoglobin, Blood Gas 10.2 (L) 13.5 - 17.5 g/dL    Hematocrit, Blood Gas 31.2 (L) 38.0 - 51.0 %    Oxyhemoglobin 88.4 (L) 94 - 99 %    Methemoglobin 0.70 0.00 - 3.00 %    Carboxyhemoglobin 1.5 0 - 5 %    A-a DO2 25.8 0.0 - 300.0 mmHg    CO2 Content 14.6 (L) 22 - 33 mmol/L    Barometric Pressure for Blood Gas 724 mmHg    Modality Room Air     FIO2 21 %    Ventilator Mode NA     Note      Notified Who DR MAZARIEGOS AND RN     Notified By [id number]     Notified Time 07/22/2023 00:56     Collected by [id number]     pH, Temp Corrected      pCO2, Temperature Corrected      pO2, Temperature Corrected     Acetone    Specimen: Blood   Result Value Ref Range    Acetone Small (C) Negative   Lactic Acid, Plasma    Specimen: Blood   Result Value Ref Range    Lactate 3.9 (C) 0.5 - 2.0 mmol/L   STAT Lactic Acid, Reflex    Specimen: Hand, Right; Blood   Result Value Ref Range    Lactate 3.6 (C) 0.5 - 2.0 mmol/L   Fentanyl, Urine - Straight Cath    Specimen: Straight Cath; Urine   Result Value Ref Range    Fentanyl, Urine Negative Negative   Urinalysis, Microscopic Only - Straight Cath    Specimen: Straight Cath; Urine   Result Value Ref Range    RBC, UA 0-2 None Seen, 0-2 /HPF    WBC, UA 3-5 (A) None Seen, 0-2 /HPF    Bacteria, UA Trace (A) None Seen /HPF    Squamous Epithelial Cells, UA 3-6 (A) None Seen, 0-2 /HPF    Hyaline Casts, UA 0-2 None Seen /LPF    Coarse Granular Casts, UA 7-12 None Seen /LPF    Methodology Manual Light Microscopy    Blood Gas, Arterial With Co-Ox    Specimen: Arterial Blood   Result Value Ref Range    Site Right Brachial      Rodney's Test N/A     pH, Arterial 7.285 (L) 7.350 - 7.450 pH units    pCO2, Arterial 33.7 (L) 35.0 - 45.0 mm Hg    pO2, Arterial 86.5 83.0 - 108.0 mm Hg    HCO3, Arterial 16.0 (L) 20.0 - 26.0 mmol/L    Base Excess, Arterial -9.8 (L) 0.0 - 2.0 mmol/L    O2 Saturation, Arterial 94.7 94.0 - 99.0 %    Hemoglobin, Blood Gas 9.4 (L) 13.5 - 17.5 g/dL    Hematocrit, Blood Gas 28.9 (L) 38.0 - 51.0 %    Oxyhemoglobin 93.0 (L) 94 - 99 %    Methemoglobin 0.30 0.00 - 3.00 %    Carboxyhemoglobin 1.5 0 - 5 %    A-a DO2 17.8 0.0 - 300.0 mmHg    CO2 Content 17.0 (L) 22 - 33 mmol/L    Barometric Pressure for Blood Gas 725 mmHg    Modality Room Air     FIO2 21 %    Ventilator Mode NA     Note      Notified [facility] VIRGILIO BURNS     Collected by [id number]     pH, Temp Corrected      pCO2, Temperature Corrected      pO2, Temperature Corrected     Ethanol    Specimen: Hand, Right; Blood   Result Value Ref Range    Ethanol 274 (H) 0 - 10 mg/dL    Ethanol % 0.274 %   STAT Lactic Acid, Reflex    Specimen: Blood   Result Value Ref Range    Lactate 3.9 (C) 0.5 - 2.0 mmol/L   Lipase    Specimen: Hand, Right; Blood   Result Value Ref Range    Lipase 24 13 - 60 U/L   Comprehensive Metabolic Panel    Specimen: Blood   Result Value Ref Range    Glucose 82 65 - 99 mg/dL    BUN 9 6 - 20 mg/dL    Creatinine 0.48 (L) 0.57 - 1.00 mg/dL    Sodium 137 136 - 145 mmol/L    Potassium 3.7 3.5 - 5.2 mmol/L    Chloride 97 (L) 98 - 107 mmol/L    CO2 18.1 (L) 22.0 - 29.0 mmol/L    Calcium 7.1 (L) 8.6 - 10.5 mg/dL    Total Protein 5.3 (L) 6.0 - 8.5 g/dL    Albumin 3.5 3.5 - 5.2 g/dL    ALT (SGPT) 88 (H) 1 - 33 U/L    AST (SGOT) 201 (H) 1 - 32 U/L    Alkaline Phosphatase 104 39 - 117 U/L    Total Bilirubin 0.2 0.0 - 1.2 mg/dL    Globulin 1.8 gm/dL    A/G Ratio 1.9 g/dL    BUN/Creatinine Ratio 18.8 7.0 - 25.0    Anion Gap 21.9 (H) 5.0 - 15.0 mmol/L    eGFR 114.1 >60.0 mL/min/1.73   CBC Auto Differential    Specimen: Blood   Result Value Ref Range    WBC 7.92 3.40 - 10.80  10*3/mm3    RBC 3.40 (L) 3.77 - 5.28 10*6/mm3    Hemoglobin 8.4 (L) 12.0 - 15.9 g/dL    Hematocrit 26.3 (L) 34.0 - 46.6 %    MCV 77.4 (L) 79.0 - 97.0 fL    MCH 24.7 (L) 26.6 - 33.0 pg    MCHC 31.9 31.5 - 35.7 g/dL    RDW 22.2 (H) 12.3 - 15.4 %    RDW-SD 61.3 (H) 37.0 - 54.0 fl    MPV 9.1 6.0 - 12.0 fL    Platelets 316 140 - 450 10*3/mm3    Neutrophil % 65.0 42.7 - 76.0 %    Lymphocyte % 19.9 19.6 - 45.3 %    Monocyte % 14.1 (H) 5.0 - 12.0 %    Eosinophil % 0.1 (L) 0.3 - 6.2 %    Basophil % 0.5 0.0 - 1.5 %    Immature Grans % 0.4 0.0 - 0.5 %    Neutrophils, Absolute 5.14 1.70 - 7.00 10*3/mm3    Lymphocytes, Absolute 1.58 0.70 - 3.10 10*3/mm3    Monocytes, Absolute 1.12 (H) 0.10 - 0.90 10*3/mm3    Eosinophils, Absolute 0.01 0.00 - 0.40 10*3/mm3    Basophils, Absolute 0.04 0.00 - 0.20 10*3/mm3    Immature Grans, Absolute 0.03 0.00 - 0.05 10*3/mm3    nRBC 0.0 0.0 - 0.2 /100 WBC   Blood Gas, Arterial With Co-Ox    Specimen: Arterial Blood   Result Value Ref Range    Site Right Radial     Rodney's Test Positive     pH, Arterial 7.404 7.350 - 7.450 pH units    pCO2, Arterial 34.0 (L) 35.0 - 45.0 mm Hg    pO2, Arterial 75.5 (L) 83.0 - 108.0 mm Hg    HCO3, Arterial 21.3 20.0 - 26.0 mmol/L    Base Excess, Arterial -3.0 (L) 0.0 - 2.0 mmol/L    O2 Saturation, Arterial 95.1 94.0 - 99.0 %    Hemoglobin, Blood Gas 8.5 (L) 13.5 - 17.5 g/dL    Hematocrit, Blood Gas 26.1 (L) 38.0 - 51.0 %    Oxyhemoglobin 92.6 (L) 94 - 99 %    Methemoglobin 0.80 0.00 - 3.00 %    Carboxyhemoglobin 1.8 0 - 5 %    A-a DO2 28.6 0.0 - 300.0 mmHg    CO2 Content 22.3 22 - 33 mmol/L    Barometric Pressure for Blood Gas 725 mmHg    Modality Room Air     FIO2 21 %    Ventilator Mode NA     Note      Collected by [id number]     pH, Temp Corrected      pCO2, Temperature Corrected      pO2, Temperature Corrected     STAT Lactic Acid, Reflex    Specimen: Blood   Result Value Ref Range    Lactate 3.4 (C) 0.5 - 2.0 mmol/L   Protime-INR    Specimen: Blood   Result Value  Ref Range    Protime 12.9 12.1 - 14.7 Seconds    INR 0.93 0.90 - 1.10   Basic Metabolic Panel    Specimen: Blood   Result Value Ref Range    Glucose 69 65 - 99 mg/dL    BUN 7 6 - 20 mg/dL    Creatinine 0.52 (L) 0.57 - 1.00 mg/dL    Sodium 135 (L) 136 - 145 mmol/L    Potassium 3.3 (L) 3.5 - 5.2 mmol/L    Chloride 97 (L) 98 - 107 mmol/L    CO2 19.2 (L) 22.0 - 29.0 mmol/L    Calcium 7.4 (L) 8.6 - 10.5 mg/dL    BUN/Creatinine Ratio 13.5 7.0 - 25.0    Anion Gap 18.8 (H) 5.0 - 15.0 mmol/L    eGFR 111.9 >60.0 mL/min/1.73   Scan Slide    Specimen: Blood   Result Value Ref Range    Anisocytosis Mod/2+ None Seen    Hypochromia Slight/1+ None Seen    Microcytes Mod/2+ None Seen    Platelet Morphology Normal Normal   STAT Lactic Acid, Reflex    Specimen: Blood   Result Value Ref Range    Lactate 1.1 0.5 - 2.0 mmol/L   Magnesium    Specimen: Blood   Result Value Ref Range    Magnesium 1.6 1.6 - 2.6 mg/dL   Magnesium    Specimen: Blood   Result Value Ref Range    Magnesium 2.7 (H) 1.6 - 2.6 mg/dL   Potassium    Specimen: Blood   Result Value Ref Range    Potassium 4.0 3.5 - 5.2 mmol/L   CBC Auto Differential    Specimen: Blood   Result Value Ref Range    WBC 6.15 3.40 - 10.80 10*3/mm3    RBC 3.56 (L) 3.77 - 5.28 10*6/mm3    Hemoglobin 8.8 (L) 12.0 - 15.9 g/dL    Hematocrit 28.3 (L) 34.0 - 46.6 %    MCV 79.5 79.0 - 97.0 fL    MCH 24.7 (L) 26.6 - 33.0 pg    MCHC 31.1 (L) 31.5 - 35.7 g/dL    RDW 22.5 (H) 12.3 - 15.4 %    RDW-SD 63.4 (H) 37.0 - 54.0 fl    MPV 9.0 6.0 - 12.0 fL    Platelets 298 140 - 450 10*3/mm3    Neutrophil % 61.8 42.7 - 76.0 %    Lymphocyte % 26.2 19.6 - 45.3 %    Monocyte % 10.2 5.0 - 12.0 %    Eosinophil % 1.1 0.3 - 6.2 %    Basophil % 0.5 0.0 - 1.5 %    Immature Grans % 0.2 0.0 - 0.5 %    Neutrophils, Absolute 3.80 1.70 - 7.00 10*3/mm3    Lymphocytes, Absolute 1.61 0.70 - 3.10 10*3/mm3    Monocytes, Absolute 0.63 0.10 - 0.90 10*3/mm3    Eosinophils, Absolute 0.07 0.00 - 0.40 10*3/mm3    Basophils, Absolute 0.03  0.00 - 0.20 10*3/mm3    Immature Grans, Absolute 0.01 0.00 - 0.05 10*3/mm3    nRBC 0.0 0.0 - 0.2 /100 WBC   Comprehensive Metabolic Panel    Specimen: Blood   Result Value Ref Range    Glucose 93 65 - 99 mg/dL    BUN 5 (L) 6 - 20 mg/dL    Creatinine 0.53 (L) 0.57 - 1.00 mg/dL    Sodium 134 (L) 136 - 145 mmol/L    Potassium 4.0 3.5 - 5.2 mmol/L    Chloride 100 98 - 107 mmol/L    CO2 23.5 22.0 - 29.0 mmol/L    Calcium 8.3 (L) 8.6 - 10.5 mg/dL    Total Protein 5.5 (L) 6.0 - 8.5 g/dL    Albumin 3.3 (L) 3.5 - 5.2 g/dL    ALT (SGPT) 61 (H) 1 - 33 U/L    AST (SGOT) 83 (H) 1 - 32 U/L    Alkaline Phosphatase 104 39 - 117 U/L    Total Bilirubin 0.5 0.0 - 1.2 mg/dL    Globulin 2.2 gm/dL    A/G Ratio 1.5 g/dL    BUN/Creatinine Ratio 9.4 7.0 - 25.0    Anion Gap 10.5 5.0 - 15.0 mmol/L    eGFR 111.4 >60.0 mL/min/1.73   POC Glucose Once    Specimen: Blood   Result Value Ref Range    Glucose 67 (L) 70 - 130 mg/dL   POC Glucose Once    Specimen: Blood   Result Value Ref Range    Glucose 164 (H) 70 - 130 mg/dL   POC Glucose Once    Specimen: Blood   Result Value Ref Range    Glucose 106 70 - 130 mg/dL   POC Glucose Once    Specimen: Blood   Result Value Ref Range    Glucose 59 (L) 70 - 130 mg/dL   POC Glucose Once    Specimen: Blood   Result Value Ref Range    Glucose 160 (H) 70 - 130 mg/dL   POC Glucose Once    Specimen: Blood   Result Value Ref Range    Glucose 61 (L) 70 - 130 mg/dL   POC Glucose Once    Specimen: Blood   Result Value Ref Range    Glucose 180 (H) 70 - 130 mg/dL   POC Glucose Once    Specimen: Blood   Result Value Ref Range    Glucose 97 70 - 130 mg/dL   POC Glucose Once    Specimen: Blood   Result Value Ref Range    Glucose 75 70 - 130 mg/dL   POC Glucose Once    Specimen: Blood   Result Value Ref Range    Glucose 79 70 - 130 mg/dL   POC Glucose Once    Specimen: Blood   Result Value Ref Range    Glucose 174 (H) 70 - 130 mg/dL   POC Glucose Once    Specimen: Blood   Result Value Ref Range    Glucose 48 (C) 70 - 130  mg/dL   POC Glucose Once    Specimen: Blood   Result Value Ref Range    Glucose 242 (H) 70 - 130 mg/dL   POC Glucose Once    Specimen: Blood   Result Value Ref Range    Glucose 101 70 - 130 mg/dL   ECG 12 Lead QT Measurement   Result Value Ref Range    QT Interval 500 ms    QTC Interval 670 ms   Green Top (Gel)   Result Value Ref Range    Extra Tube Hold for add-ons.    Lavender Top   Result Value Ref Range    Extra Tube hold for add-on    Light Blue Top   Result Value Ref Range    Extra Tube Hold for add-ons.       CT Head Without Contrast   Final Result   Negative noncontrast head CT.       This report was finalized on 7/22/2023 2:28 AM by Ysabel Paredes MD.          XR Chest 1 View   Final Result   Mild pulmonary vascular congestion. Subsegmental atelectasis/airspace   disease in the lung bases       This report was finalized on 7/22/2023 1:39 AM by Alex Pallas, DO.               ED Course  ED Course as of 07/23/23 0724   Sat Jul 22, 2023   0034 Glucose(!): 67 [SM]   0056 pH, Arterial(!!): 7.211 [SM]   0058 Lengthy discussion with attending provider Dr. Guzman. Orders placed by him.  [SM]   0117 Acetone(!!): Small [SM]   0117 HCO3, Arterial(!): 13.6 [SM]   0230 XR Chest 1 View [SM]   0230 CT Head Without Contrast [SM]   0347 Lactate(!!): 3.9 [SM]   0417 Barbiturates Screen, Urine(!): Positive [SM]   0417 Benzodiazepine Screen, Urine(!): Positive [SM]   0626 D/W Dr. Courtney agrees to admit  [SM]      ED Course User Index  [SM] Lesa Lamb, APRN                                           Medical Decision Making  Problems Addressed:  Alcohol intoxication delirium: complicated acute illness or injury    Amount and/or Complexity of Data Reviewed  Labs: ordered. Decision-making details documented in ED Course.  Radiology: ordered. Decision-making details documented in ED Course.    Risk  Prescription drug management.  Decision regarding hospitalization.        Final diagnoses:   Alcohol intoxication delirium        ED Disposition  ED Disposition       ED Disposition   Decision to Admit    Condition   --    Comment   Level of Care: Critical Care [6]   Diagnosis: Alcohol intoxication delirium [337485]   Admitting Physician: ERVIN COURTNEY [[id number]]   Attending Physician: ERVIN COURTNEY [[id number]]   Certification: I Certify That Inpatient Hospital Services Are Medically Necessary For Greater Than 2 Midnights                 No follow-up provider specified.       Medication List      No changes were made to your prescriptions during this visit.            Lesa Lamb, APRN  07/23/23 0724      Electronically signed by Thomas May MD at 07/23/23 2032       Hari Reagan, RN at 07/22/23 0054          Patient states she drank a 5th today.    Electronically signed by Hari Reagan, RN at 07/22/23 0056       Patricia Judge, RN at 07/22/23 0016          Pt unable to give us any information, she is a new patient here. Pt is intoxicated and unable to answer questions.     Electronically signed by Patricia Judge, HARVEY at 07/22/23 0016       Current Facility-Administered Medications   Medication Dose Route Frequency Provider Last Rate Last Admin    sennosides-docusate (PERICOLACE) 8.6-50 MG per tablet 2 tablet  2 tablet Oral BID Ervin Courtney MD   2 tablet at 07/24/23 0840    And    polyethylene glycol (MIRALAX) packet 17 g  17 g Oral Daily PRN Ervin Courtney MD        And    bisacodyl (DULCOLAX) EC tablet 5 mg  5 mg Oral Daily PRN Ervin Courtney MD        And    bisacodyl (DULCOLAX) suppository 10 mg  10 mg Rectal Daily PRN Ervin Courtney MD        dextrose (D50W) (25 g/50 mL) IV injection 25 g  25 g Intravenous Q15 Min PRN Brant Avila DO   25 g at 07/22/23 1324    dextrose (GLUTOSE) oral gel 15 g  15 g Oral Q15 Min PRN Brant Avila DO        FLUoxetine (PROzac) capsule 40 mg  40 mg Oral Daily Brant Avila DO   40 mg at 07/24/23 0840    folic acid  1 mg in sodium chloride 0.9 % 50 mL IVPB  1 mg Intravenous Daily Brant Avila  mL/hr at 07/24/23 0840 1 mg at 07/24/23 0840    glucagon (human recombinant) (GLUCAGEN DIAGNOSTIC) injection 1 mg  1 mg Subcutaneous Q15 Min PRN Brant Avila DO        LORazepam (ATIVAN) tablet 1 mg  1 mg Oral Q1H PRN Brant Avila DO        Or    LORazepam (ATIVAN) injection 1 mg  1 mg Intravenous Q1H PRN Brant Avila DO   1 mg at 07/23/23 1652    Or    LORazepam (ATIVAN) tablet 2 mg  2 mg Oral Q1H PRN Brant Avila DO        Or    LORazepam (ATIVAN) injection 2 mg  2 mg Intravenous Q1H PRN Brant Avila DO   2 mg at 07/23/23 2229    Or    LORazepam (ATIVAN) injection 2 mg  2 mg Intravenous Q15 Min PRN Brant Avila DO        Or    LORazepam (ATIVAN) injection 2 mg  2 mg Intramuscular Q15 Min PRN Brant Avila DO        Or    LORazepam (ATIVAN) tablet 4 mg  4 mg Oral Q1H PRN Brant Avila DO        Or    LORazepam (ATIVAN) injection 4 mg  4 mg Intravenous Q1H PRN Brant Avila DO        Magnesium Cardiology Dose Replacement - Follow Nurse / BPA Driven Protocol   Does not apply PRErvin Recinos MD        nitroglycerin (NITROSTAT) SL tablet 0.4 mg  0.4 mg Sublingual Q5 Min PRN Ervin Courtney MD        Potassium Replacement - Follow Nurse / BPA Driven Protocol   Does not apply Ervin Cox MD        prochlorperazine (COMPAZINE) injection 5 mg  5 mg Intravenous Q6H PRN Brant Avila DO   5 mg at 07/23/23 1357    sodium chloride 0.9 % flush 10 mL  10 mL Intravenous PRN Ervin Courtney MD        sodium chloride 0.9 % flush 10 mL  10 mL Intravenous Q12H Ervin Courtney MD   10 mL at 07/23/23 2100    sodium chloride 0.9 % flush 10 mL  10 mL Intravenous PRN Ervin Courtney MD        sodium chloride 0.9 % infusion 40 mL  40 mL Intravenous PRN Ervin Courtney MD        thiamine (B-1) injection 200 mg  200 mg Intravenous Q8H Austin, Brant, DO   200 mg at  07/24/23 0605    Followed by    [START ON 7/27/2023] thiamine (VITAMIN B-1) tablet 100 mg  100 mg Oral Daily Brant Avila DO         Orders (last 24 hrs)        Start     Ordered    07/27/23 1400  thiamine (VITAMIN B-1) tablet 100 mg  Daily        See Hyperspace for full Linked Orders Report.    07/22/23 0835    07/24/23 0740  Transfer Patient  Once         07/24/23 0739    07/24/23 0618  POC Glucose Once  PROCEDURE ONCE         07/24/23 0610    07/24/23 0600  Comprehensive Metabolic Panel  Morning Draw         07/23/23 1430    07/24/23 0600  CBC (No Diff)  Morning Draw         07/23/23 1430    07/24/23 0205  Magnesium  Once         07/24/23 0205    07/23/23 2240  POC Glucose Once  PROCEDURE ONCE         07/23/23 2233    07/23/23 1702  POC Glucose Once  PROCEDURE ONCE         07/23/23 1656    07/23/23 1429  TSH  Once         07/23/23 1428    07/23/23 1139  POC Glucose Once  PROCEDURE ONCE         07/23/23 1132    07/23/23 1114  prochlorperazine (COMPAZINE) injection 5 mg  Every 6 Hours PRN         07/23/23 1114    07/22/23 2200  POC Glucose Finger 4x Daily AC & at Bedtime  4 Times Daily Before Meals & at Bedtime       07/22/23 1746    07/22/23 2026  Magnesium Cardiology Dose Replacement - Follow Nurse / BPA Driven Protocol  As Needed         07/22/23 2026    07/22/23 2026  Potassium Replacement - Follow Nurse / BPA Driven Protocol  As Needed         07/22/23 2026 07/22/23 1900  FLUoxetine (PROzac) capsule 40 mg  Daily         07/22/23 1745    07/22/23 1800  POC Glucose Finger Q2H  Every 2 Hours,   Status:  Canceled       07/22/23 1745    07/22/23 1400  thiamine (B-1) injection 200 mg  Every 8 Hours Scheduled        See Hyperspace for full Linked Orders Report.    07/22/23 0835    07/22/23 1145  dextrose (D5W) 5 % infusion  Continuous,   Status:  Discontinued         07/22/23 1048    07/22/23 1100  folic acid 1 mg in sodium chloride 0.9 % 50 mL IVPB  Daily         07/22/23 0835    07/22/23 1048  dextrose  (GLUTOSE) oral gel 15 g  Every 15 Minutes PRN         07/22/23 1048    07/22/23 1048  dextrose (D50W) (25 g/50 mL) IV injection 25 g  Every 15 Minutes PRN         07/22/23 1048    07/22/23 1048  glucagon (human recombinant) (GLUCAGEN DIAGNOSTIC) injection 1 mg  Every 15 Minutes PRN         07/22/23 1048    07/22/23 0900  sodium chloride 0.9 % flush 10 mL  Every 12 Hours Scheduled         07/22/23 0748    07/22/23 0900  sennosides-docusate (PERICOLACE) 8.6-50 MG per tablet 2 tablet  2 Times Daily        See Hyperspace for full Linked Orders Report.    07/22/23 0748    07/22/23 0834  LORazepam (ATIVAN) tablet 1 mg  Every 1 Hour PRN        See Hyperspace for full Linked Orders Report.    07/22/23 0835    07/22/23 0834  LORazepam (ATIVAN) injection 1 mg  Every 1 Hour PRN        See Hyperspace for full Linked Orders Report.    07/22/23 0835    07/22/23 0834  LORazepam (ATIVAN) tablet 2 mg  Every 1 Hour PRN        See Hyperspace for full Linked Orders Report.    07/22/23 0835    07/22/23 0834  LORazepam (ATIVAN) injection 2 mg  Every 1 Hour PRN        See Hyperspace for full Linked Orders Report.    07/22/23 0835    07/22/23 0834  LORazepam (ATIVAN) injection 2 mg  Every 15 Minutes PRN        See Hyperspace for full Linked Orders Report.    07/22/23 0835    07/22/23 0834  LORazepam (ATIVAN) injection 2 mg  Every 15 Minutes PRN        See Hyperspace for full Linked Orders Report.    07/22/23 0835    07/22/23 0834  LORazepam (ATIVAN) tablet 4 mg  Every 1 Hour PRN        See Hyperspace for full Linked Orders Report.    07/22/23 0835    07/22/23 0834  LORazepam (ATIVAN) injection 4 mg  Every 1 Hour PRN        See Hyperspace for full Linked Orders Report.    07/22/23 0835    07/22/23 0800  Vital Signs Every Hour and Per Hospital Policy Based on Patient Condition  Every Hour       07/22/23 0748    07/22/23 0749  Daily Weights  Daily       07/22/23 0748    07/22/23 0749  Oral Care - Patient Not on NPPV & Not Intubated  Every  Shift       07/22/23 0748    07/22/23 0748  nitroglycerin (NITROSTAT) SL tablet 0.4 mg  Every 5 Minutes PRN         07/22/23 0748    07/22/23 0748  sodium chloride 0.9 % flush 10 mL  As Needed         07/22/23 0748    07/22/23 0748  sodium chloride 0.9 % infusion 40 mL  As Needed         07/22/23 0748    07/22/23 0748  polyethylene glycol (MIRALAX) packet 17 g  Daily PRN        See Hyperspace for full Linked Orders Report.    07/22/23 0748    07/22/23 0748  bisacodyl (DULCOLAX) EC tablet 5 mg  Daily PRN        See Hyperspace for full Linked Orders Report.    07/22/23 0748    07/22/23 0748  bisacodyl (DULCOLAX) suppository 10 mg  Daily PRN        See Hyperspace for full Linked Orders Report.    07/22/23 0748    07/22/23 0700  POC Glucose Finger Q1H  Every Hour,   Status:  Canceled       07/22/23 0623    07/22/23 0022  sodium chloride 0.9 % flush 10 mL  As Needed        See Hyperspace for full Linked Orders Report.    07/22/23 0023    07/01/23 0000  Lurasidone HCl (Latuda) 80 MG tablet tablet  Daily         07/22/23 1539    Unscheduled  Obtain Pre & Post Sedation Scores With Every Lorazepam Dose - Hold For POSS Greater Than 2 or RASS of -3 or Less  As Needed       07/22/23 0630    Unscheduled  Obtain Pre & Post Sedation Scores With Every Lorazepam Dose - Hold For POSS Greater Than 2 or RASS of -3 or Less  As Needed       07/22/23 0835    Unscheduled  Follow Hypoglycemia Standing Orders For Blood Glucose <70 & Notify Provider of Treatment  As Needed      Comments: Follow Hypoglycemia Orders As Outlined in Process Instructions (Open Order Report to View Full Instructions)  Notify Provider Any Time Hypoglycemia Treatment is Administered    07/22/23 1048    --  propranolol (INDERAL) 20 MG tablet  3 Times Daily         07/22/23 1539    --  gabapentin (NEURONTIN) 300 MG capsule  2 Times Daily         07/22/23 1539    --  lamoTRIgine (LaMICtal) 200 MG tablet  Daily         07/22/23 1539    --  FLUoxetine (PROzac) 40 MG  capsule  Daily         23 153    --  traZODone (DESYREL) 50 MG tablet  Nightly         23 153    --  SCANNED - TELEMETRY           23 0000    --  SCANNED - TELEMETRY           23 0000    --  SCANNED - TELEMETRY           23 0000    --  SCANNED - TELEMETRY           23 0000    --  SCANNED - TELEMETRY           23 0000    --  SCANNED - TELEMETRY           23 0000    --  SCANNED - TELEMETRY           23 0000    --  SCANNED - TELEMETRY           23 0000    --  SCANNED - TELEMETRY           23 0000                     Physician Progress Notes (last 24 hours)        Brant Avila DO at 23 1411              [facility] HOSPITALIST PROGRESS NOTE     Patient Identification:  Name:  Lorraine Lincoln  Age:  52 y.o.  Sex:  female  :  1970  MRN:  [id number]  Visit Number:  [id number]  ROOM: [address]     Primary Care Provider:  Provider, No Known    Length of stay in inpatient status:  1    Subjective     Chief Compliant:    Chief Complaint   Patient presents with    Alcohol Intoxication       History of Presenting Illness: Patient seen and evaluated in follow-up for acute alcohol intoxication with delirium with alcohol induced hepatitis with initial suicidal ideation now resolved.  Patient today with beginning clinical signs of some mild withdrawal with some tremors and is currently on CIWA protocol.  Patient more alert and oriented today but still confused at times attempting to ask where her car keys are not and that she is going to leave and drive.    Objective     Current Hospital Meds:  FLUoxetine, 40 mg, Oral, Daily  folic acid (FOLVITE) IVPB, 1 mg, Intravenous, Daily  senna-docusate sodium, 2 tablet, Oral, BID  sodium chloride, 10 mL, Intravenous, Q12H  thiamine (B-1) IV, 200 mg, Intravenous, Q8H   Followed by  [START ON 2023] thiamine, 100 mg, Oral, Daily      dextrose, 75 mL/hr, Last Rate: 75 mL/hr (23  0029)      ----------------------------------------------------------------------------------------------------------------------  Vital Signs:  Temp:  [98.8 °F (37.1 °C)-99.9 °F (37.7 °C)] 99.9 °F (37.7 °C)  Heart Rate:  [] 84  Resp:  [16-27] 17  BP: ()/(55-92) 133/86  SpO2:  [93 %-100 %] 96 %  on   ;   Device (Oxygen Therapy): room air  Body mass index is 28.95 kg/m².      Intake/Output Summary (Last 24 hours) at 7/23/2023 1411  Last data filed at 7/23/2023 1355  Gross per 24 hour   Intake 1475.05 ml   Output 2575 ml   Net -1099.95 ml      ----------------------------------------------------------------------------------------------------------------------  Physical exam:  Constitutional:  Well-developed and well-nourished.  No acute distress.      HENT:  Head:  Normocephalic and atraumatic.  Mouth:  Moist mucous membranes.    Eyes:  Conjunctivae and EOM are normal. No scleral icterus.    Cardiovascular:  Normal rate, regular rhythm and normal heart sounds with no murmur.  Pulmonary/Chest:  No respiratory distress, no wheezes, no crackles, with normal breath sounds and good air movement.  Abdominal:  Soft.  Bowel sounds are normal.  No distension and no tenderness.   Musculoskeletal:  No tenderness and no deformity.  No red or swollen joints anywhere.  Functional ROM intact.   Neurological:  Alert and oriented to person, place, and time.  No cranial nerve deficit.  No tongue deviation.  No facial droop.  No slurred speech. Intact Sensation throughout  Skin:  Skin is warm and dry. No rash or lesion noted. No pallor.   Peripheral vascular:  Pulses in all 4 extremities with no clubbing, no cyanosis, no edema.  Psychiatric: Depressed mood, compliant with conversation and exam.   ----------------------------------------------------------------------------------------------------------------------  WBC/HGB/HCT/PLT   6.15/8.8/28.3/298 (07/23 0559)  BUN/CREAT/GLUC/ALT/AST/DYLAN/LIP    5/0.53/93/61/83/--/--  (07/23 0559)  LYTES - Na/K/Cl/CO2: 134*/4.0, 4.0/100/23.5 (07/23 0559)  COAG - PT/INR/PTT: 12.9/0.93/-- (07/22 1705)     No results found for: URINECX  No results found for: BLOODCX    I have personally looked at the labs and they are summarized above.  ----------------------------------------------------------------------------------------------------------------------  Detailed radiology reports for the last 24 hours:  CT Head Without Contrast    Result Date: 7/22/2023  Negative noncontrast head CT.  This report was finalized on 7/22/2023 2:28 AM by Ysabel Paredes MD.      XR Chest 1 View    Result Date: 7/22/2023  Mild pulmonary vascular congestion. Subsegmental atelectasis/airspace disease in the lung bases  This report was finalized on 7/22/2023 1:39 AM by Alex Pallas, DO.     Assessment & Plan      Acute alcohol intoxication  Chronic alcohol use disorder  Acute alcohol withdrawal, currently mild  Alcohol induced hepatitis    -Patient with delirium on day of admission 7/22 now with progression to withdrawal as she is becoming tremulous at times and still with intermittent episodes of delirium    -Continue CIWA Ativan detox    -Patient counseled on the importance of cessation of alcohol usage as patient has shown any clear documented past history of recurrent alcohol abuse as well as legal issues due to this.    -Patient reportedly found passed out at local local store shortly after being released from long-term.    Suicidal ideation, resolved    - previous statements regarding desire to harm herself now resolved    -Seen and evaluated by psychiatry who recommend discontinuation of sitter and suicide precautions as patient voicing these ideations due to marital issues with her 's infidelity but stating to psychiatry that she wants to be safe and has no desire or intent to harm herself now.    -Continue to monitor for any morning clinical signs or symptoms    History of bipolar 1 disorder  History of  anxiety/depression    -Documented review of prior records patient does not clearly confirm or deny these past medical history diagnoses.    -Discussed.  Certainly some component of this could be due to really substance abuse with her alcohol rather than from true anxiety or depression however for now cannot completely rule out and will continue with resuming home antidepressant only.    -Psychiatry consulted and following along, appreciate input, patient hopefully may be a potential good candidate for hospitalization in the psychiatric unit for safety    History of hypothyroidism    -Patient does not appear to be currently on levothyroxine, will check TSH levels    Copied text in portions of the note has been reviewed and is accurate as of 07/23/23    VTE Prophylaxis:   Mechanical Order History:        Ordered        07/22/23 0748  Place Sequential Compression Device  Once            07/22/23 0748  Maintain Sequential Compression Device  Continuous                          Pharmalogical Order History:       None            Disposition pending clinical course and medical stability but likely home withdrawal treatment.    Brant Avila DO  [facility] Hospitalist  07/23/23  14:11 EDT      Electronically signed by Brant Avila DO at 07/23/23 1422       Consult Notes (last 24 hours)  Notes from 07/23/23 0848 through 07/24/23 0848   No notes of this type exist for this encounter.

## 2023-07-24 NOTE — PLAN OF CARE
Goal Outcome Evaluation:  Plan of Care Reviewed With: patient        Progress: no change  Outcome Evaluation: Patient AOx4. VSS, afebrile. RA. NSR. IVF infusing. CIWA protocol in place. Care ongoing.

## 2023-07-24 NOTE — PLAN OF CARE
Problem: Adult Behavioral Health Plan of Care  Goal: Plan of Care Review  Recent Flowsheet Documentation  Taken 7/24/2023 1900 by Kary Bernabe, RN  Plan of Care Reviewed With: patient  Patient Agreement with Plan of Care: agrees   Goal Outcome Evaluation:  Plan of Care Reviewed With: patient  Patient Agreement with Plan of Care: agrees           New admission.  Care plan initiated.

## 2023-07-25 ENCOUNTER — HOSPITAL ENCOUNTER (EMERGENCY)
Facility: HOSPITAL | Age: 53
Discharge: PSYCHIATRIC OR SUBSTANCE USE FACILITY | End: 2023-07-25
Attending: STUDENT IN AN ORGANIZED HEALTH CARE EDUCATION/TRAINING PROGRAM | Admitting: EMERGENCY MEDICINE
Payer: MEDICAID

## 2023-07-25 ENCOUNTER — HOSPITAL ENCOUNTER (INPATIENT)
Facility: HOSPITAL | Age: 53
LOS: 6 days | Discharge: HOME | DRG: 885 | End: 2023-07-31
Attending: PSYCHIATRY & NEUROLOGY | Admitting: PSYCHIATRY & NEUROLOGY
Payer: MEDICAID

## 2023-07-25 VITALS
SYSTOLIC BLOOD PRESSURE: 130 MMHG | DIASTOLIC BLOOD PRESSURE: 86 MMHG | WEIGHT: 147.49 LBS | OXYGEN SATURATION: 98 % | HEART RATE: 76 BPM | RESPIRATION RATE: 19 BRPM | BODY MASS INDEX: 27.14 KG/M2 | HEIGHT: 62 IN | TEMPERATURE: 97.7 F

## 2023-07-25 DIAGNOSIS — F19.10 SUBSTANCE ABUSE: ICD-10-CM

## 2023-07-25 DIAGNOSIS — R45.851 SUICIDAL IDEATIONS: Primary | ICD-10-CM

## 2023-07-25 LAB
ALBUMIN SERPL-MCNC: 3.8 G/DL (ref 3.5–5.2)
ALBUMIN/GLOB SERPL: 1.8 G/DL
ALP SERPL-CCNC: 100 U/L (ref 39–117)
ALT SERPL W P-5'-P-CCNC: 47 U/L (ref 1–33)
AMPHET+METHAMPHET UR QL: NEGATIVE
AMPHETAMINES UR QL: NEGATIVE
ANION GAP SERPL CALCULATED.3IONS-SCNC: 17.9 MMOL/L (ref 5–15)
ANISOCYTOSIS BLD QL: NORMAL
AST SERPL-CCNC: 47 U/L (ref 1–32)
BARBITURATES UR QL SCN: POSITIVE
BASOPHILS # BLD AUTO: 0.03 10*3/MM3 (ref 0–0.2)
BASOPHILS NFR BLD AUTO: 0.5 % (ref 0–1.5)
BENZODIAZ UR QL SCN: POSITIVE
BILIRUB SERPL-MCNC: 0.2 MG/DL (ref 0–1.2)
BILIRUB UR QL STRIP: NEGATIVE
BUN SERPL-MCNC: 2 MG/DL (ref 6–20)
BUN/CREAT SERPL: 4 (ref 7–25)
BUPRENORPHINE SERPL-MCNC: NEGATIVE NG/ML
CALCIUM SPEC-SCNC: 8.7 MG/DL (ref 8.6–10.5)
CANNABINOIDS SERPL QL: NEGATIVE
CHLORIDE SERPL-SCNC: 103 MMOL/L (ref 98–107)
CLARITY UR: CLEAR
CO2 SERPL-SCNC: 19.1 MMOL/L (ref 22–29)
COCAINE UR QL: NEGATIVE
COLOR UR: YELLOW
CREAT SERPL-MCNC: 0.5 MG/DL (ref 0.57–1)
DACRYOCYTES BLD QL SMEAR: NORMAL
DEPRECATED RDW RBC AUTO: 64.6 FL (ref 37–54)
EGFRCR SERPLBLD CKD-EPI 2021: 113 ML/MIN/1.73
EOSINOPHIL # BLD AUTO: 0.05 10*3/MM3 (ref 0–0.4)
EOSINOPHIL NFR BLD AUTO: 0.8 % (ref 0.3–6.2)
ERYTHROCYTE [DISTWIDTH] IN BLOOD BY AUTOMATED COUNT: 22.5 % (ref 12.3–15.4)
ETHANOL BLD-MCNC: 173 MG/DL (ref 0–10)
ETHANOL BLD-MCNC: 280 MG/DL (ref 0–10)
ETHANOL BLD-MCNC: 65 MG/DL (ref 0–10)
ETHANOL UR QL: 0.07 %
ETHANOL UR QL: 0.17 %
ETHANOL UR QL: 0.28 %
FENTANYL UR-MCNC: NEGATIVE NG/ML
FLUAV RNA RESP QL NAA+PROBE: NOT DETECTED
FLUAV RNA RESP QL NAA+PROBE: NOT DETECTED
FLUBV RNA ISLT QL NAA+PROBE: NOT DETECTED
FLUBV RNA RESP QL NAA+PROBE: NOT DETECTED
GLOBULIN UR ELPH-MCNC: 2.1 GM/DL
GLUCOSE SERPL-MCNC: 86 MG/DL (ref 65–99)
GLUCOSE UR STRIP-MCNC: NEGATIVE MG/DL
HCT VFR BLD AUTO: 29.7 % (ref 34–46.6)
HGB BLD-MCNC: 9.1 G/DL (ref 12–15.9)
HGB UR QL STRIP.AUTO: NEGATIVE
HYPOCHROMIA BLD QL: NORMAL
IMM GRANULOCYTES # BLD AUTO: 0.02 10*3/MM3 (ref 0–0.05)
IMM GRANULOCYTES NFR BLD AUTO: 0.3 % (ref 0–0.5)
KETONES UR QL STRIP: ABNORMAL
LEUKOCYTE ESTERASE UR QL STRIP.AUTO: NEGATIVE
LYMPHOCYTES # BLD AUTO: 1.92 10*3/MM3 (ref 0.7–3.1)
LYMPHOCYTES NFR BLD AUTO: 29 % (ref 19.6–45.3)
MAGNESIUM SERPL-MCNC: 1.8 MG/DL (ref 1.6–2.6)
MCH RBC QN AUTO: 24.6 PG (ref 26.6–33)
MCHC RBC AUTO-ENTMCNC: 30.6 G/DL (ref 31.5–35.7)
MCV RBC AUTO: 80.3 FL (ref 79–97)
METHADONE UR QL SCN: NEGATIVE
MICROCYTES BLD QL: NORMAL
MONOCYTES # BLD AUTO: 0.5 10*3/MM3 (ref 0.1–0.9)
MONOCYTES NFR BLD AUTO: 7.6 % (ref 5–12)
NEUTROPHILS NFR BLD AUTO: 4.09 10*3/MM3 (ref 1.7–7)
NEUTROPHILS NFR BLD AUTO: 61.8 % (ref 42.7–76)
NITRITE UR QL STRIP: NEGATIVE
NRBC BLD AUTO-RTO: 0 /100 WBC (ref 0–0.2)
OPIATES UR QL: NEGATIVE
OXYCODONE UR QL SCN: NEGATIVE
PCP UR QL SCN: NEGATIVE
PH UR STRIP.AUTO: 6 [PH] (ref 5–8)
PLAT MORPH BLD: NORMAL
PLATELET # BLD AUTO: 289 10*3/MM3 (ref 140–450)
PMV BLD AUTO: 9.2 FL (ref 6–12)
POLYCHROMASIA BLD QL SMEAR: NORMAL
POTASSIUM SERPL-SCNC: 3.4 MMOL/L (ref 3.5–5.2)
PROPOXYPH UR QL: NEGATIVE
PROT SERPL-MCNC: 5.9 G/DL (ref 6–8.5)
PROT UR QL STRIP: NEGATIVE
RBC # BLD AUTO: 3.7 10*6/MM3 (ref 3.77–5.28)
SARS-COV-2 RNA RESP QL NAA+PROBE: NOT DETECTED
SARS-COV-2 RNA RESP QL NAA+PROBE: NOT DETECTED
SODIUM SERPL-SCNC: 140 MMOL/L (ref 136–145)
SP GR UR STRIP: 1.01 (ref 1–1.03)
TRICYCLICS UR QL SCN: NEGATIVE
UROBILINOGEN UR QL STRIP: ABNORMAL
WBC NRBC COR # BLD: 6.61 10*3/MM3 (ref 3.4–10.8)

## 2023-07-25 PROCEDURE — 93005 ELECTROCARDIOGRAM TRACING: CPT | Performed by: EMERGENCY MEDICINE

## 2023-07-25 PROCEDURE — 87636 SARSCOV2 & INF A&B AMP PRB: CPT | Performed by: EMERGENCY MEDICINE

## 2023-07-25 PROCEDURE — 96375 TX/PRO/DX INJ NEW DRUG ADDON: CPT

## 2023-07-25 PROCEDURE — 99285 EMERGENCY DEPT VISIT HI MDM: CPT

## 2023-07-25 PROCEDURE — 85007 BL SMEAR W/DIFF WBC COUNT: CPT | Performed by: STUDENT IN AN ORGANIZED HEALTH CARE EDUCATION/TRAINING PROGRAM

## 2023-07-25 PROCEDURE — 25010000002 PROCHLORPERAZINE 10 MG/2ML SOLUTION: Performed by: STUDENT IN AN ORGANIZED HEALTH CARE EDUCATION/TRAINING PROGRAM

## 2023-07-25 PROCEDURE — 80053 COMPREHEN METABOLIC PANEL: CPT | Performed by: STUDENT IN AN ORGANIZED HEALTH CARE EDUCATION/TRAINING PROGRAM

## 2023-07-25 PROCEDURE — 87636 SARSCOV2 & INF A&B AMP PRB: CPT | Performed by: STUDENT IN AN ORGANIZED HEALTH CARE EDUCATION/TRAINING PROGRAM

## 2023-07-25 PROCEDURE — 85025 COMPLETE CBC W/AUTO DIFF WBC: CPT | Performed by: STUDENT IN AN ORGANIZED HEALTH CARE EDUCATION/TRAINING PROGRAM

## 2023-07-25 PROCEDURE — 93005 ELECTROCARDIOGRAM TRACING: CPT | Performed by: PSYCHIATRY & NEUROLOGY

## 2023-07-25 PROCEDURE — 93010 ELECTROCARDIOGRAM REPORT: CPT | Performed by: INTERNAL MEDICINE

## 2023-07-25 PROCEDURE — 63710000001 PROMETHAZINE PER 25 MG: Performed by: EMERGENCY MEDICINE

## 2023-07-25 PROCEDURE — 80307 DRUG TEST PRSMV CHEM ANLYZR: CPT | Performed by: STUDENT IN AN ORGANIZED HEALTH CARE EDUCATION/TRAINING PROGRAM

## 2023-07-25 PROCEDURE — 83735 ASSAY OF MAGNESIUM: CPT | Performed by: STUDENT IN AN ORGANIZED HEALTH CARE EDUCATION/TRAINING PROGRAM

## 2023-07-25 PROCEDURE — 25010000002 METOCLOPRAMIDE PER 10 MG: Performed by: STUDENT IN AN ORGANIZED HEALTH CARE EDUCATION/TRAINING PROGRAM

## 2023-07-25 PROCEDURE — 81003 URINALYSIS AUTO W/O SCOPE: CPT | Performed by: STUDENT IN AN ORGANIZED HEALTH CARE EDUCATION/TRAINING PROGRAM

## 2023-07-25 PROCEDURE — 82077 ASSAY SPEC XCP UR&BREATH IA: CPT | Performed by: STUDENT IN AN ORGANIZED HEALTH CARE EDUCATION/TRAINING PROGRAM

## 2023-07-25 PROCEDURE — 96374 THER/PROPH/DIAG INJ IV PUSH: CPT

## 2023-07-25 RX ORDER — LORAZEPAM 2 MG/1
2 TABLET ORAL
Status: COMPLETED | OUTPATIENT
Start: 2023-07-26 | End: 2023-07-26

## 2023-07-25 RX ORDER — LORAZEPAM 1 MG/1
1 TABLET ORAL EVERY 4 HOURS PRN
Status: ACTIVE | OUTPATIENT
Start: 2023-07-28 | End: 2023-07-29

## 2023-07-25 RX ORDER — BENZTROPINE MESYLATE 1 MG/1
2 TABLET ORAL ONCE AS NEEDED
Status: DISCONTINUED | OUTPATIENT
Start: 2023-07-25 | End: 2023-07-31 | Stop reason: HOSPADM

## 2023-07-25 RX ORDER — LORAZEPAM 1 MG/1
1 TABLET ORAL
Status: COMPLETED | OUTPATIENT
Start: 2023-07-28 | End: 2023-07-28

## 2023-07-25 RX ORDER — FAMOTIDINE 20 MG/1
20 TABLET, FILM COATED ORAL 2 TIMES DAILY PRN
Status: DISCONTINUED | OUTPATIENT
Start: 2023-07-25 | End: 2023-07-31 | Stop reason: HOSPADM

## 2023-07-25 RX ORDER — ECHINACEA PURPUREA EXTRACT 125 MG
2 TABLET ORAL AS NEEDED
Status: DISCONTINUED | OUTPATIENT
Start: 2023-07-25 | End: 2023-07-31 | Stop reason: HOSPADM

## 2023-07-25 RX ORDER — MULTIPLE VITAMINS W/ MINERALS TAB 9MG-400MCG
1 TAB ORAL DAILY
Status: DISCONTINUED | OUTPATIENT
Start: 2023-07-26 | End: 2023-07-31 | Stop reason: HOSPADM

## 2023-07-25 RX ORDER — LOPERAMIDE HYDROCHLORIDE 2 MG/1
2 CAPSULE ORAL
Status: DISCONTINUED | OUTPATIENT
Start: 2023-07-25 | End: 2023-07-31 | Stop reason: HOSPADM

## 2023-07-25 RX ORDER — IBUPROFEN 400 MG/1
400 TABLET ORAL EVERY 6 HOURS PRN
Status: DISCONTINUED | OUTPATIENT
Start: 2023-07-25 | End: 2023-07-31 | Stop reason: HOSPADM

## 2023-07-25 RX ORDER — LORAZEPAM 0.5 MG/1
0.5 TABLET ORAL
Status: COMPLETED | OUTPATIENT
Start: 2023-07-29 | End: 2023-07-29

## 2023-07-25 RX ORDER — LORAZEPAM 2 MG/1
2 TABLET ORAL EVERY 4 HOURS PRN
Status: ACTIVE | OUTPATIENT
Start: 2023-07-26 | End: 2023-07-27

## 2023-07-25 RX ORDER — ALUMINA, MAGNESIA, AND SIMETHICONE 2400; 2400; 240 MG/30ML; MG/30ML; MG/30ML
15 SUSPENSION ORAL EVERY 6 HOURS PRN
Status: DISCONTINUED | OUTPATIENT
Start: 2023-07-25 | End: 2023-07-31 | Stop reason: HOSPADM

## 2023-07-25 RX ORDER — PROCHLORPERAZINE EDISYLATE 5 MG/ML
2.5 INJECTION INTRAMUSCULAR; INTRAVENOUS ONCE
Status: COMPLETED | OUTPATIENT
Start: 2023-07-25 | End: 2023-07-25

## 2023-07-25 RX ORDER — METOCLOPRAMIDE HYDROCHLORIDE 5 MG/ML
5 INJECTION INTRAMUSCULAR; INTRAVENOUS ONCE
Status: COMPLETED | OUTPATIENT
Start: 2023-07-25 | End: 2023-07-25

## 2023-07-25 RX ORDER — PROMETHAZINE HYDROCHLORIDE 25 MG/1
25 TABLET ORAL ONCE
Status: COMPLETED | OUTPATIENT
Start: 2023-07-25 | End: 2023-07-25

## 2023-07-25 RX ORDER — MULTIVITAMIN WITH IRON
2 TABLET ORAL DAILY
Status: DISCONTINUED | OUTPATIENT
Start: 2023-07-26 | End: 2023-07-31 | Stop reason: HOSPADM

## 2023-07-25 RX ORDER — LORAZEPAM 2 MG/1
2 TABLET ORAL ONCE
Status: COMPLETED | OUTPATIENT
Start: 2023-07-25 | End: 2023-07-25

## 2023-07-25 RX ORDER — BENZTROPINE MESYLATE 1 MG/ML
1 INJECTION INTRAMUSCULAR; INTRAVENOUS ONCE AS NEEDED
Status: DISCONTINUED | OUTPATIENT
Start: 2023-07-25 | End: 2023-07-31 | Stop reason: HOSPADM

## 2023-07-25 RX ORDER — NICOTINE 21 MG/24HR
1 PATCH, TRANSDERMAL 24 HOURS TRANSDERMAL
Status: DISCONTINUED | OUTPATIENT
Start: 2023-07-26 | End: 2023-07-31 | Stop reason: HOSPADM

## 2023-07-25 RX ORDER — LORAZEPAM 2 MG/1
2 TABLET ORAL
Status: ACTIVE | OUTPATIENT
Start: 2023-07-25 | End: 2023-07-26

## 2023-07-25 RX ORDER — LORAZEPAM 0.5 MG/1
0.5 TABLET ORAL EVERY 4 HOURS PRN
Status: ACTIVE | OUTPATIENT
Start: 2023-07-29 | End: 2023-07-30

## 2023-07-25 RX ORDER — BENZONATATE 100 MG/1
100 CAPSULE ORAL 3 TIMES DAILY PRN
Status: DISCONTINUED | OUTPATIENT
Start: 2023-07-25 | End: 2023-07-31 | Stop reason: HOSPADM

## 2023-07-25 RX ADMIN — PROMETHAZINE HYDROCHLORIDE 25 MG: 25 TABLET ORAL at 22:24

## 2023-07-25 RX ADMIN — PROCHLORPERAZINE EDISYLATE 2.5 MG: 5 INJECTION INTRAMUSCULAR; INTRAVENOUS at 17:24

## 2023-07-25 RX ADMIN — LORAZEPAM 2 MG: 2 TABLET ORAL at 22:24

## 2023-07-25 RX ADMIN — METOCLOPRAMIDE HYDROCHLORIDE 5 MG: 5 INJECTION INTRAMUSCULAR; INTRAVENOUS at 11:11

## 2023-07-25 NOTE — H&P
Patient seen and evaluated on medical floor prior to admission to detox.  I consulted on patient over the weekend due to suicidal ideation and alcohol use disorder.  Patient has a history of DUIs with a recent DUI and arrests for intoxication.  She was admitted to medical for treatment and placed on a detox protocol.  She is scoring low at this point in time but continues to endorse withdrawal symptoms including nausea, tremors, shakes, fatigue, and body aches.  She was requesting to leave AGAINST MEDICAL ADVICE due to wanting to smoke and concerns about her car being impounded.  I discussed patient's care with her and she elected to stay and be transferred for completion of detox.  She reports no SI/HI/AVH but she is having some depression and anxiety about her current life situation with legal issues and her marriage.  Prior to patient being fully evaluated on the detox unit, she did elect to leave AGAINST MEDICAL ADVICE.  As a result, full evaluation after consult was not performed.

## 2023-07-25 NOTE — CASE MANAGEMENT/SOCIAL WORK
Case Management Discharge Note      Final Note: Patient was discharged and transferred to [facility] on 7/24/23.         Selected Continued Care - Discharged on 7/24/2023 Admission date: 7/22/2023 - Discharge disposition: Psychiatric Hospital or Unit (DC - External)              Final Discharge Disposition Code: 65 - psychiatric hospital or unit

## 2023-07-25 NOTE — ED PROVIDER NOTES
Subjective   History of Present Illness  52-year-old female presents to the ER with EMS due to concerns for altered mental status.  Patient was found behind a hotel floor on multiple alcoholic beverages.  Patient has a longstanding history of alcohol abuse.  On arrival, patient was responsive to painful stimuli and apprehensive to any questioning.  Patient noted she wished to just be left alone.  No focal deficits.  Vital signs stable.  Patient noted she just wanted to die.    Review of Systems   Constitutional:  Positive for fatigue.   Psychiatric/Behavioral:  Positive for suicidal ideas.    All other systems reviewed and are negative.    Past Medical History:   Diagnosis Date    Alcoholism     Anorexia nervosa     Anxiety     Atrial fibrillation     Bipolar 1 disorder     Depression     Seizures        No Known Allergies    Past Surgical History:   Procedure Laterality Date    APPENDECTOMY      GASTRIC BYPASS      HYSTERECTOMY      SMALL INTESTINE SURGERY         Family History   Problem Relation Age of Onset    Drug abuse Sister        Social History     Socioeconomic History    Marital status:      Spouse name: Does not disclose    Number of children: 3    Highest education level: Some college, no degree   Tobacco Use    Smoking status: Every Day     Packs/day: 1.00     Years: 12.00     Pack years: 12.00     Types: Cigarettes     Passive exposure: Current    Smokeless tobacco: Never   Vaping Use    Vaping Use: Never used   Substance and Sexual Activity    Alcohol use: Yes     Comment: see below    Drug use: Yes     Comment: ETOH only    Sexual activity: Defer           Objective   Physical Exam  Constitutional:       General: She is not in acute distress.     Appearance: Normal appearance. She is not ill-appearing.   HENT:      Head: Normocephalic and atraumatic.      Right Ear: External ear normal.      Left Ear: External ear normal.      Nose: Nose normal.      Mouth/Throat:      Mouth: Mucous membranes  are moist.   Eyes:      Extraocular Movements: Extraocular movements intact.      Pupils: Pupils are equal, round, and reactive to light.   Cardiovascular:      Rate and Rhythm: Normal rate and regular rhythm.      Heart sounds: No murmur heard.  Pulmonary:      Effort: Pulmonary effort is normal. No respiratory distress.      Breath sounds: Normal breath sounds. No wheezing.   Abdominal:      General: Bowel sounds are normal.      Palpations: Abdomen is soft.      Tenderness: There is no abdominal tenderness.   Musculoskeletal:         General: No deformity or signs of injury. Normal range of motion.      Cervical back: Normal range of motion and neck supple.   Skin:     General: Skin is warm and dry.      Findings: No erythema.   Neurological:      General: No focal deficit present.      Mental Status: She is alert and oriented to person, place, and time. Mental status is at baseline.      Cranial Nerves: No cranial nerve deficit.   Psychiatric:         Mood and Affect: Mood normal.         Behavior: Behavior normal.         Thought Content: Thought content normal.       Procedures  Results for orders placed or performed during the hospital encounter of 07/25/23   COVID-19 and FLU A/B PCR - Swab, Nasopharynx    Specimen: Nasopharynx; Swab   Result Value Ref Range    COVID19 Not Detected Not Detected - Ref. Range    Influenza A PCR Not Detected Not Detected    Influenza B PCR Not Detected Not Detected   COVID-19 and FLU A/B PCR - Swab, Nasopharynx    Specimen: Nasopharynx; Swab   Result Value Ref Range    COVID19 Not Detected Not Detected - Ref. Range    Influenza A PCR Not Detected Not Detected    Influenza B PCR Not Detected Not Detected   Comprehensive Metabolic Panel    Specimen: Blood   Result Value Ref Range    Glucose 86 65 - 99 mg/dL    BUN 2 (L) 6 - 20 mg/dL    Creatinine 0.50 (L) 0.57 - 1.00 mg/dL    Sodium 140 136 - 145 mmol/L    Potassium 3.4 (L) 3.5 - 5.2 mmol/L    Chloride 103 98 - 107 mmol/L    CO2  19.1 (L) 22.0 - 29.0 mmol/L    Calcium 8.7 8.6 - 10.5 mg/dL    Total Protein 5.9 (L) 6.0 - 8.5 g/dL    Albumin 3.8 3.5 - 5.2 g/dL    ALT (SGPT) 47 (H) 1 - 33 U/L    AST (SGOT) 47 (H) 1 - 32 U/L    Alkaline Phosphatase 100 39 - 117 U/L    Total Bilirubin 0.2 0.0 - 1.2 mg/dL    Globulin 2.1 gm/dL    A/G Ratio 1.8 g/dL    BUN/Creatinine Ratio 4.0 (L) 7.0 - 25.0    Anion Gap 17.9 (H) 5.0 - 15.0 mmol/L    eGFR 113.0 >60.0 mL/min/1.73   Urinalysis With Microscopic If Indicated (No Culture) - Urine, Clean Catch    Specimen: Urine, Clean Catch   Result Value Ref Range    Color, UA Yellow Yellow, Straw    Appearance, UA Clear Clear    pH, UA 6.0 5.0 - 8.0    Specific Gravity, UA 1.011 1.005 - 1.030    Glucose, UA Negative Negative    Ketones, UA Trace (A) Negative    Bilirubin, UA Negative Negative    Blood, UA Negative Negative    Protein, UA Negative Negative    Leuk Esterase, UA Negative Negative    Nitrite, UA Negative Negative    Urobilinogen, UA 1.0 E.U./dL 0.2 - 1.0 E.U./dL   Urine Drug Screen - Urine, Clean Catch    Specimen: Urine, Clean Catch   Result Value Ref Range    THC, Screen, Urine Negative Negative    Phencyclidine (PCP), Urine Negative Negative    Cocaine Screen, Urine Negative Negative    Methamphetamine, Ur Negative Negative    Opiate Screen Negative Negative    Amphetamine Screen, Urine Negative Negative    Benzodiazepine Screen, Urine Positive (A) Negative    Tricyclic Antidepressants Screen Negative Negative    Methadone Screen, Urine Negative Negative    Barbiturates Screen, Urine Positive (A) Negative    Oxycodone Screen, Urine Negative Negative    Propoxyphene Screen Negative Negative    Buprenorphine, Screen, Urine Negative Negative   Ethanol    Specimen: Blood   Result Value Ref Range    Ethanol 280 (H) 0 - 10 mg/dL    Ethanol % 0.280 %   Magnesium    Specimen: Blood   Result Value Ref Range    Magnesium 1.8 1.6 - 2.6 mg/dL   CBC Auto Differential    Specimen: Blood   Result Value Ref Range     WBC 6.61 3.40 - 10.80 10*3/mm3    RBC 3.70 (L) 3.77 - 5.28 10*6/mm3    Hemoglobin 9.1 (L) 12.0 - 15.9 g/dL    Hematocrit 29.7 (L) 34.0 - 46.6 %    MCV 80.3 79.0 - 97.0 fL    MCH 24.6 (L) 26.6 - 33.0 pg    MCHC 30.6 (L) 31.5 - 35.7 g/dL    RDW 22.5 (H) 12.3 - 15.4 %    RDW-SD 64.6 (H) 37.0 - 54.0 fl    MPV 9.2 6.0 - 12.0 fL    Platelets 289 140 - 450 10*3/mm3    Neutrophil % 61.8 42.7 - 76.0 %    Lymphocyte % 29.0 19.6 - 45.3 %    Monocyte % 7.6 5.0 - 12.0 %    Eosinophil % 0.8 0.3 - 6.2 %    Basophil % 0.5 0.0 - 1.5 %    Immature Grans % 0.3 0.0 - 0.5 %    Neutrophils, Absolute 4.09 1.70 - 7.00 10*3/mm3    Lymphocytes, Absolute 1.92 0.70 - 3.10 10*3/mm3    Monocytes, Absolute 0.50 0.10 - 0.90 10*3/mm3    Eosinophils, Absolute 0.05 0.00 - 0.40 10*3/mm3    Basophils, Absolute 0.03 0.00 - 0.20 10*3/mm3    Immature Grans, Absolute 0.02 0.00 - 0.05 10*3/mm3    nRBC 0.0 0.0 - 0.2 /100 WBC   Scan Slide    Specimen: Blood   Result Value Ref Range    Anisocytosis Large/3+ None Seen    Dacrocytes Slight/1+ None Seen    Hypochromia Slight/1+ None Seen    Microcytes Slight/1+ None Seen    Polychromasia Slight/1+ None Seen    Platelet Morphology Normal Normal   Fentanyl, Urine - Urine, Clean Catch    Specimen: Urine, Clean Catch   Result Value Ref Range    Fentanyl, Urine Negative Negative   Ethanol    Specimen: Arm, Left; Blood   Result Value Ref Range    Ethanol 173 (H) 0 - 10 mg/dL    Ethanol % 0.173 %   Ethanol    Specimen: Blood   Result Value Ref Range    Ethanol 65 (H) 0 - 10 mg/dL    Ethanol % 0.065 %   ECG 12 Lead QT Measurement   Result Value Ref Range    QT Interval 420 ms    QTC Interval 502 ms              ED Course  ED Course as of 07/25/23 2226 Tue Jul 25, 2023   6139 Care of this patient was transferred to the next attending physician at shift change.  Complete discussion of presentation, labs, imaging, care, and expected course occurred during transition of providers.  Vitals stable at transfer of  care.    Electronically signed by Carlos Alberto Del Castillo DO, 07/25/23, 6:59 PM EDT.   [SF]   2128 I assumed patient's care from Dr. Del Castillo this evening at shift change, pending alcohol level below 100 for the detox unit.  Patient is requesting medication for nausea.  Her most recent EKG was on 7/22/2023, at which time her QTc was 670 ms.  I have ordered an EKG. [CM]   2222 Today's EKG at 2140 shows sinus rhythm, rate 86.  AK interval 162, QRS duration 62, QTc has improved to 502 ms.  There is baseline artifact.  Nonspecific ST-T changes.  No evidence for STEMI.  Psychiatry is advised to avoid all QT prolonging drugs.  Patient has been evaluated by psychiatry intake.  She is being admitted to the psychiatry unit/discharged to behavioral health. [CM]      ED Course User Index  [CM] Alfredo Burton MD  [SF] Carlos Alberot Del Castillo DO                                           Medical Decision Making  Problems Addressed:  Substance abuse: complicated acute illness or injury  Suicidal ideations: complicated acute illness or injury    Amount and/or Complexity of Data Reviewed  Labs: ordered.  ECG/medicine tests: ordered.    Risk  Prescription drug management.        Final diagnoses:   Suicidal ideations   Substance abuse       ED Disposition  ED Disposition       ED Disposition   DC/Transfer to Behavioral Health    Condition   Stable    Comment   --               Please note that portions of this note were completed with a voice recognition program.                Alfredo Burton MD  07/25/23 8640

## 2023-07-25 NOTE — ED NOTES
Helped the patient put on paper scrubs at this time.  Brought the patient's belongings to Intake.

## 2023-07-25 NOTE — PAYOR COMM NOTE
"[facility]  MEGAN FRANZ  PHONE  [phone]  FAX  [phone]  NPI:  [id number]    PATIENT D/C 7/24/2023    Lorraine Lincoln (52 y.o. Female)       Date of Birth   1970    Social Security Number       Address   [address] [place] [address]    Home Phone   [phone]    MRN   [id number]       Caodaism   None    Marital Status                               Admission Date   7/22/23    Admission Type   Emergency    Admitting Provider   Ervin Courtney MD    Attending Provider       Department, Room/Bed   [facility] PROGRESS CARE, P213/S2       Discharge Date   7/24/2023    Discharge Disposition   Psychiatric Hospital or Unit (DC - External)    Discharge Destination                                 Attending Provider: (none)   Allergies: No Known Allergies    Isolation: None   Infection: None   Code Status: Prior    Ht: 157.5 cm (62\")   Wt: 71.8 kg (158 lb 4.6 oz)    Admission Cmt: None   Principal Problem: Alcohol intoxication delirium [F10.121]                   Active Insurance as of 7/22/2023       Primary Coverage       Payor Plan Insurance Group Employer/Plan Group    MEDICAID OUT OF STATE MISC MEDICAID OUT OF STATE        Coverage Address Coverage Phone Number Coverage Fax Number Effective Dates    [address] [place] [phone]  10/1/2021 - None Entered    [place] [address]         Subscriber Name Subscriber Birth Date Member ID       LORRAINE LINCOLN 1970 [id number]                     Emergency Contacts            No emergency contacts on file.              Discharge Summary    No notes of this type exist for this encounter.       "

## 2023-07-26 LAB
QT INTERVAL: 420 MS
QT INTERVAL: 466 MS
QTC INTERVAL: 495 MS
QTC INTERVAL: 502 MS

## 2023-07-26 PROCEDURE — 99223 1ST HOSP IP/OBS HIGH 75: CPT | Performed by: PSYCHIATRY & NEUROLOGY

## 2023-07-26 PROCEDURE — 63710000001 PROMETHAZINE PER 25 MG: Performed by: PSYCHIATRY & NEUROLOGY

## 2023-07-26 PROCEDURE — HZ2ZZZZ DETOXIFICATION SERVICES FOR SUBSTANCE ABUSE TREATMENT: ICD-10-PCS | Performed by: PSYCHIATRY & NEUROLOGY

## 2023-07-26 PROCEDURE — 93010 ELECTROCARDIOGRAM REPORT: CPT | Performed by: INTERNAL MEDICINE

## 2023-07-26 RX ORDER — CHLORDIAZEPOXIDE HYDROCHLORIDE 25 MG/1
25 CAPSULE, GELATIN COATED ORAL EVERY 8 HOURS SCHEDULED
Status: DISCONTINUED | OUTPATIENT
Start: 2023-07-26 | End: 2023-07-27

## 2023-07-26 RX ORDER — FLUOXETINE HYDROCHLORIDE 20 MG/1
40 CAPSULE ORAL DAILY
Status: CANCELLED | OUTPATIENT
Start: 2023-07-26

## 2023-07-26 RX ORDER — FOLIC ACID 1 MG/1
1 TABLET ORAL DAILY
Status: DISCONTINUED | OUTPATIENT
Start: 2023-07-26 | End: 2023-07-31 | Stop reason: HOSPADM

## 2023-07-26 RX ORDER — PROMETHAZINE HYDROCHLORIDE 25 MG/1
25 TABLET ORAL EVERY 8 HOURS PRN
Status: DISCONTINUED | OUTPATIENT
Start: 2023-07-26 | End: 2023-07-31 | Stop reason: HOSPADM

## 2023-07-26 RX ORDER — FLUOXETINE HYDROCHLORIDE 20 MG/1
40 CAPSULE ORAL DAILY
Status: DISCONTINUED | OUTPATIENT
Start: 2023-07-26 | End: 2023-07-31 | Stop reason: HOSPADM

## 2023-07-26 RX ORDER — FOLIC ACID 1 MG/1
1 TABLET ORAL DAILY
Status: CANCELLED | OUTPATIENT
Start: 2023-07-26

## 2023-07-26 RX ORDER — FOLIC ACID 1 MG/1
1 TABLET ORAL DAILY
COMMUNITY

## 2023-07-26 RX ORDER — FLUOXETINE HYDROCHLORIDE 40 MG/1
40 CAPSULE ORAL DAILY
Status: ON HOLD | COMMUNITY
End: 2023-07-31 | Stop reason: SDUPTHER

## 2023-07-26 RX ADMIN — Medication 100 MG: at 08:23

## 2023-07-26 RX ADMIN — PROMETHAZINE HYDROCHLORIDE 25 MG: 25 TABLET ORAL at 14:35

## 2023-07-26 RX ADMIN — LORAZEPAM 2 MG: 2 TABLET ORAL at 14:35

## 2023-07-26 RX ADMIN — CHLORDIAZEPOXIDE HYDROCHLORIDE 25 MG: 25 CAPSULE ORAL at 21:26

## 2023-07-26 RX ADMIN — Medication 2 TABLET: at 08:22

## 2023-07-26 RX ADMIN — PROMETHAZINE HYDROCHLORIDE 25 MG: 25 TABLET ORAL at 22:14

## 2023-07-26 RX ADMIN — Medication 1 TABLET: at 08:23

## 2023-07-26 RX ADMIN — LORAZEPAM 2 MG: 2 TABLET ORAL at 21:26

## 2023-07-26 RX ADMIN — Medication 1 MG: at 08:22

## 2023-07-26 RX ADMIN — CHLORDIAZEPOXIDE HYDROCHLORIDE 25 MG: 25 CAPSULE ORAL at 13:00

## 2023-07-26 RX ADMIN — LORAZEPAM 2 MG: 2 TABLET ORAL at 08:23

## 2023-07-26 RX ADMIN — FLUOXETINE HYDROCHLORIDE 40 MG: 20 CAPSULE ORAL at 08:23

## 2023-07-26 NOTE — PLAN OF CARE
Goal Outcome Evaluation:  Plan of Care Reviewed With: patient  Patient Agreement with Plan of Care: agrees  Consent Given to Review Plan with: No consents signed on this date.  Progress: improving  Outcome Evaluation: Therapist met with Patient to review care plan, social history, aftercare recommendations and disposition planning.      Problem: Adult Behavioral Health Plan of Care  Goal: Plan of Care Review  Outcome: Ongoing, Progressing  Flowsheets (Taken 7/26/2023 1538)  Consent Given to Review Plan with: No consents signed on this date.  Progress: improving  Plan of Care Reviewed With: patient  Patient Agreement with Plan of Care: agrees  Outcome Evaluation: Therapist met with Patient to review care plan, social history, aftercare recommendations and disposition planning.     Problem: Adult Behavioral Health Plan of Care  Goal: Patient-Specific Goal (Individualization)  Outcome: Ongoing, Progressing  Flowsheets  Taken 7/26/2023 1538  Patient-Specific Goals (Include Timeframe): Patient will identify 2-3 healthy coping skills, complete safety planning, complete aftercare planning and deny SI/HI prior to discharge.  Individualized Care Needs: Therapist will offer 1-4 therapy sessions, safety planning, aftercare planning, daily groups and brief CBT/MI interventions.  Anxieties, Fears or Concerns: None voiced.  Taken 7/26/2023 1521  Patient Personal Strengths:   expressive of emotions   expressive of needs   resourceful   self-reliant   tolerant   resilient  Patient Vulnerabilities:   family/relationship conflict   substance abuse/addiction   history of unsuccessful treatment   poor impulse control   housing insecurity   lacks insight into illness   occupational insecurity     Problem: Adult Behavioral Health Plan of Care  Goal: Optimized Coping Skills in Response to Life Stressors  Outcome: Ongoing, Progressing  Flowsheets (Taken 7/26/2023 1538)  Optimized Coping Skills in Response to Life Stressors: making progress  toward outcome  Intervention: Promote Effective Coping Strategies  Flowsheets (Taken 7/26/2023 1538)  Supportive Measures:   active listening utilized   decision-making supported   positive reinforcement provided   verbalization of feelings encouraged   problem-solving facilitated     Problem: Adult Behavioral Health Plan of Care  Goal: Develops/Participates in Therapeutic [place] to Support Successful Transition  Outcome: Ongoing, Progressing  Flowsheets (Taken 7/26/2023 1538)  Develops/Participates in Therapeutic [place] to Support Successful Transition: making progress toward outcome  Intervention: Foster Therapeutic [place]  Flowsheets (Taken 7/26/2023 1538)  Trust Relationship/Rapport:   care explained   empathic listening provided   reassurance provided   choices provided   questions answered   emotional support provided   thoughts/feelings acknowledged   questions encouraged  Intervention: Mutually Develop Transition Plan  Flowsheets (Taken 7/26/2023 1538)  Outpatient/Agency/Support Group Needs:   outpatient counseling   outpatient medication management   outpatient psychiatric care (specify)   residential services  Discharge Coordination/Progress:   Therapist met with Patient to complete a discharge needs assessment   Patient agreeable.  Transition Support:   community resources reviewed   crisis management plan verbalized   follow-up care discussed   crisis management plan promoted   follow-up care coordinated  Transportation Anticipated: public transportation  Anticipated Discharge Disposition:   residential substance use unit   home or self-care   homeless  Transportation Concerns: (Car is currently impounded.) no car  Current Discharge Risk:   psychiatric illness   substance use/abuse   homeless  Concerns to be Addressed:   adjustment to diagnosis/illness   coping/stress   suicidal   medication   substance/tobacco abuse/use   mental health   discharge planning   homelessness  Readmission Within the  Last 30 Days: no previous admission in last 30 days  Patient/Family Anticipated Services at Transition:   outpatient care   mental health services  Patient/Family Anticipates Transition to: (Unknown at this time.) other (see comments)    DATA: Therapist met individually with Patient this date for initial evaluation.  Introduced self as Therapist and the role of a positive therapeutic relationship; Patient agreeable.      Therapist encouraged Patient to speak openly and honestly about any issues or stressors during treatment stay. Therapist explained how open communication is significant to providing most effective care.      Therapist completed psychosocial assessment, integrated summary, reviewed care plans, disposition planning and discussed hospitalization expectations and treatment goals this date.     Therapist provided education regarding different levels of care and is recommending residential treatment versus outpatient counseling for most appropriate aftercare. Patient agreeable. No consents signed on this date.     Therapist is recommending family involvement prior to discharge and it's importance. No consents signed on this date.     Patient signed consents for [employer] and the [employer].     Therapist spoke with [employer] who states Patient's car has been in impound since 7/21 and it is currently $445.00 to get it out, and goes up $45.00 a day. He states Patient will need to get a release from the [employer] in order to get her car from them.     Therapist contacted the [employer] who states she will need to present her license, proof of insurance and registration before they will give [facility] a [facility]     Patient states she is unsure of where her license, insurance and registration is at this time. Therapist currently working on getting in contact the officer who dealt with this case to obtain  more information.     CLINICAL MANUVERING/INTERVENTIONS:  Assisted Patient in processing session content; acknowledged and normalized Patient's thoughts, feelings, and concerns by utilizing a person-centered approach in efforts to build appropriate rapport and a positive therapeutic relationship with open and honest communication. Allowed Patient to ventilate regarding current stressors and triggers for negative emotions and thoughts in a safe nonjudgmental environment with unconditional positive regard, active listening skills, and empathy.     ASSESSMENT: Lorraine Lincoln is a 52-year-old  female who presented to the ED via EMS after being found unresponsive at a local hotel. Patient was calm and cooperative with assessment. She states she was traveling from [place] to live with her sister in [place] and stopped to spend the night at the [email or web address]. She states she went to the liquor store and ended up passing out in her car and the police were called. Patient states the police had her car impounded and brought her to the hospital. She declines having any money to get her car out of impound and doesn't know where she will go from here. Patient states she has been drinking daily for 12 years but declines rehab.     PLAN: Patient will receive 24/7 nursing monitoring and daily psychiatrist evaluation by a multidisciplinary team.    Patient will continue stabilization at this time.     No consents for aftercare were signed on this date.

## 2023-07-26 NOTE — NURSING NOTE
Presented pt to DR. Quiles new order to admit sp3 routine orders. Ativan detox, remove trazodone, zofran, and hydroxyzine from order set.pt may have ring while on unit [id number]

## 2023-07-26 NOTE — PLAN OF CARE
Problem: Adult Behavioral Health Plan of Care  Goal: Plan of Care Review  Recent Flowsheet Documentation  Taken 7/25/2023 2327 by Kary Bernabe, RN  Plan of Care Reviewed With: patient  Patient Agreement with Plan of Care: agrees   Goal Outcome Evaluation:  Plan of Care Reviewed With: patient  Patient Agreement with Plan of Care: agrees      New admission.  Care plan initiated.

## 2023-07-26 NOTE — H&P
INITIAL PSYCHIATRIC HISTORY & PHYSICAL    Patient Identification:  Name:  Lorraine Lincoln  Age:  52 y.o.  Sex:  female  :  1970  MRN:  [id number]   Visit Number:  [id number]  Primary Care Physician:  Provider, No Known    SUBJECTIVE    CC/Focus of Exam: Suicidal    HPI: Lorraine Lincoln is a 52 y.o. female who was admitted on 2023 and evaluated on 2023  with complaints of suicidal ideation.  Patient states that she has suicidal thoughts with no plan.  Patient states that she was found unconscious behind a hotel.  Patient states that she feels helpless, hopeless, worthless, and powerless.  She denies any substance abuse.  Patient states that she drinks 1/5 of liquor for the last 12 years.  Patient states that she uses tobacco.  Patient states her  cheating on her and being homeless as stressors in her life.  Patient rates her appetite as poor.  Patient rates her sleep as poor.  Patient denies any nightmares.  Patient rates her anxiety on a scale of 1-10 with 10 being the most severe a 10.  Patient rates her depression on a scale of 1-10 with 10 being the most severe a 10.  Patient's CIWA was 14.  Patient states she has suicidal ideation.  Patient denies any homicidal ideation.      Patient endorses depressed mood, sx of anhedonia, low energy, fatigue, decreased motivation. Onset: months ago.  No history of garrett or hypomania based on extensive screening questions posed by me today.   Patient denies any hallucinations.  Patient was admitted to [facility] psychiat[facility] for further safety and stabilization.    PAST PSYCHIATRIC HX: Patient has had 1 prior admission on 2023.  Patient only stayed 1 hour before she left [place].  Patient denies any outpatient care.    SUBSTANCE USE HX: UDS was positive for benzodiazepine, barbiturates.  See HPI for current use.    SOCIAL HX: Patient states she was born and raised in [place].  Patient states she is currently homeless.  Patient states that  she is  but states they are currently .  Patient states she has grown children.  Patient states that she is currently unemployed.  Patient states she has some college education.  Patient states she has legal issues.  Patient states she has pending DUI charge.    FAMILY HX: Sister has drug abuse.    Past Medical History:   Diagnosis Date    Alcoholism     Anorexia nervosa     Anxiety     Atrial fibrillation     Bipolar 1 disorder     Depression     Seizures           Past Surgical History:   Procedure Laterality Date    APPENDECTOMY      GASTRIC BYPASS      HYSTERECTOMY      SMALL INTESTINE SURGERY         Family History   Problem Relation Age of Onset    Drug abuse Sister          Medications Prior to Admission   Medication Sig Dispense Refill Last Dose    FLUoxetine (PROzac) 40 MG capsule Take 1 capsule by mouth Daily.   7/25/2023    folic acid (FOLVITE) 1 MG tablet Take 1 tablet by mouth Daily.   7/25/2023    thiamine (VITAMIN B-1) 100 MG tablet  tablet Take 1 tablet by mouth Daily.   7/25/2023         ALLERGIES:  Patient has no known allergies.    Temp:  [96.8 øF (36 øC)-97.7 øF (36.5 øC)] 97 øF (36.1 øC)  Heart Rate:  [] 82  Resp:  [18-19] 18  BP: (108-134)/(74-97) 114/74    REVIEW OF SYSTEMS:  Review of Systems   Constitutional:  Positive for activity change, appetite change, chills, diaphoresis and fatigue.   HENT: Negative.     Eyes: Negative.    Respiratory: Negative.     Cardiovascular: Negative.    Gastrointestinal:  Positive for nausea.   Endocrine: Negative.    Genitourinary: Negative.    Musculoskeletal: Negative.    Skin: Negative.    Allergic/Immunologic: Negative.    Neurological:  Positive for dizziness and tremors.   Hematological: Negative.       OBJECTIVE    PHYSICAL EXAM:  Physical Exam  Constitutional:       Appearance: She is well-developed.   HENT:      Head: Normocephalic and atraumatic.      Nose: Nose normal.   Eyes:      General: No scleral icterus.        Right eye:  No discharge.         Left eye: No discharge.      Conjunctiva/sclera: Conjunctivae normal.      Pupils: Pupils are equal, round, and reactive to light.   Neck:      Thyroid: No thyromegaly.      Vascular: No JVD.   Cardiovascular:      Rate and Rhythm: Normal rate and regular rhythm.      Heart sounds: Normal heart sounds. No murmur heard.    No friction rub. No gallop.   Pulmonary:      Effort: Pulmonary effort is normal. No respiratory distress.      Breath sounds: Normal breath sounds. No wheezing.   Abdominal:      General: Bowel sounds are normal. There is no distension.      Palpations: Abdomen is soft. There is no mass.      Tenderness: There is no guarding or rebound.   Musculoskeletal:         General: No tenderness or deformity. Normal range of motion.      Cervical back: Normal range of motion and neck supple.   Lymphadenopathy:      Cervical: No cervical adenopathy.   Skin:     General: Skin is warm and dry.      Coloration: Skin is not pale.      Findings: No erythema or rash.   Neurological:      Mental Status: She is alert and oriented to person, place, and time.      Cranial Nerves: No cranial nerve deficit.      Motor: No abnormal muscle tone.      Coordination: Coordination normal.       MENTAL STATUS EXAM:    Hygiene:   fair  Cooperation:  Cooperative  Eye Contact:  Good  Psychomotor Behavior:  Appropriate  Affect:  Appropriate  Hopelessness: 5  Speech:  Normal  Linear  Thought Content:  Normal  Suicidal:  Suicidal Ideation  Homicidal:  None  Hallucinations:  None  Delusion:  None  Memory:  Intact  Orientation:  Person, Place, Time, and Situation  Reliability:  fair  Insight:  Fair  Judgement:  Poor  Impulse Control:  Poor      Imaging Results (Last 24 Hours)       ** No results found for the last 24 hours. **             ECG/EMG Results (most recent)       Procedure Component Value Units Date/Time    ECG 12 Lead Other [[id number]] Collected: 07/26/23 0404     Updated: 07/26/23 0405     QT  Interval 466 ms      QTC Interval 495 ms     Narrative:      Test Reason : Baseline Cardiac Status~  Blood Pressure :   */*   mmHG  Vent. Rate :  68 BPM     Atrial Rate :  68 BPM     P-R Int : 144 ms          QRS Dur :  80 ms      QT Int : 466 ms       P-R-T Axes :  42  28  39 degrees     QTc Int : 495 ms    Normal sinus rhythm  Prolonged QT  Abnormal ECG  When compared with ECG of 25-JUL-2023 21:40, (Unconfirmed)  Criteria for Septal infarct are no longer present    Referred By: KIM PLATA           Confirmed By:              Lab Results   Component Value Date    GLUCOSE 86 07/25/2023    BUN 2 (L) 07/25/2023    CREATININE 0.50 (L) 07/25/2023    EGFRIFNONA >60 05/07/2020    EGFRIFAFRI >60 05/07/2020    BCR 4.0 (L) 07/25/2023    CO2 19.1 (L) 07/25/2023    CALCIUM 8.7 07/25/2023    ALBUMIN 3.8 07/25/2023    AST 47 (H) 07/25/2023    ALT 47 (H) 07/25/2023       Lab Results   Component Value Date    WBC 6.61 07/25/2023    HGB 9.1 (L) 07/25/2023    HCT 29.7 (L) 07/25/2023    MCV 80.3 07/25/2023     07/25/2023       Last Urine Toxicity  More data may exist         Latest Ref Rng & Units 7/25/2023 7/22/2023   LAST URINE TOXICITY RESULTS   Amphetamine, Urine Qual Negative Negative  Negative    Barbiturates Screen, Urine Negative Positive  Positive    Benzodiazepine Screen, Urine Negative Positive  Positive    Buprenorphine, Screen, Urine Negative Negative  Negative    Cocaine Screen, Urine Negative Negative  Negative    Fentanyl, Urine Negative Negative  Negative    Methadone Screen , Urine Negative Negative  Negative    Methamphetamine, Ur Negative Negative  Negative        Brief Urine Lab Results  (Last result in the past 365 days)        Color   Clarity   Blood   Leuk Est   Nitrite   Protein   CREAT   Urine HCG        07/25/23 1050 Yellow   Clear   Negative   Negative   Negative   Negative                       ASSESSMENT & PLAN:    Major Depressive Disorder, recurrrent, severe without psychotic features    Given  the high risk and/or potentially life-threatening nature of patient's presenting symptoms, patient has been admitted for safety and stabilization and placed on the appropriate level of precautions.  Patient will be assigned a Master's level therapist and encouraged to participate in both individual and group therapy on the unit.  Routine labs have been ordered.    CODE STATUS: Full    Patient has been of home dose of prozac 40mg daily for 1 week.  Will restart at 40mg, with plan to increase to 60mg Daily    Alcohol Dependence, Severe  Alcohol Withdrawal  - Ativan Detox protocol  - Patient is experiencing significant withdrawal Sx at this time and has a h/o Delirium Tremens.  Will add Librium 25mg TID and taper manually.  - Thiamine       Hypokalemia  - 3.4 on admission  - Encourage po intake, recheck BMP in AM    Elevated LFT's  - AST/ALT 47/47 on admission  - Likely secondary to alcohol use, monitor    H/o hypothyroidism  - Check TSH          Further treatment planning as per course.    I have discussed with the patient the risks, benefits, side effects, and treatment alternatives to the medication being prescribed. Patient has also been instructed regarding the risks versus benefits of no treatment and also the fact that treatment does not guarantee results.  Patient voices an understanding of this and accepts the risks associated with the use of this medication. Patient agrees to notify all of their prescribers of the recent medication change.    IRaj MD, personally performed the services described in this documentation as scribed by the below named individual and is both accurate and complete   as of 7/26/2023.       This note was generated using a scribe, Katherine Aguilar.  The work documented in this note was completed, reviewed, and approved by the attending psychiatrist as designated Dr.Brian Goyal electronic signature.

## 2023-07-26 NOTE — PLAN OF CARE
Goal Outcome Evaluation:  Plan of Care Reviewed With: patient  Patient Agreement with Plan of Care: agrees        Outcome Evaluation: Patient has been cooperative with staff requests. Denies SI, HI or AVH. Multiple withdrawal complaints voiced this shift. Tolerating Ativan detox without any adverse effects. Appetite improving.Will continue to monitor.

## 2023-07-26 NOTE — DISCHARGE SUMMARY
Date of Discharge: 7/24/23    Discharge Diagnosis:Principal Problem:    Alcohol withdrawal      Hospital Course:  Patient was admitted for detox and stabilization.   Routine labs were checked.  Patient was assigned a master's level therapist and provided with an opportunity to participate in group and individual chemical dependency counseling on the unit.  Patient was started on an Ativan detox protocol and 100mg of thiamine daily.   Prior to patient being fully evaluated on the detox unit, she did elect to leave AGAINST MEDICAL ADVICE     Consults:   Consults       Date and Time Order Name Status Description    7/22/2023  8:33 AM Inpatient Psychiatrist Consult Completed             Labs:  Lab Results (all)       None            Imaging:  Imaging Results (All)       None                    Condition on Discharge:  gaurded    Vital Signs       Discharge Disposition  Left Against Medical Advice    Discharge Medications     Discharge Medications        Stop These Medications      FLUoxetine 40 MG capsule  Commonly known as: PROzac     gabapentin 300 MG capsule  Commonly known as: NEURONTIN     lamoTRIgine 200 MG tablet  Commonly known as: LaMICtal     Lurasidone HCl 80 MG tablet tablet  Commonly known as: LATUDA     propranolol 20 MG tablet  Commonly known as: INDERAL     traZODone 50 MG tablet  Commonly known as: DESYREL              Discharge Diet: Regular    Activity at Discharge: As tolerated      Raj Goyal MD  07/26/23  10:27 EDT

## 2023-07-26 NOTE — NURSING NOTE
Pt assessment complete pt arrived to ED after being found unconscious behind hotel     Pt reports SI with no plan   Pt denies hi/avh   Pt reports drinking 1 fifth daily for the last 12 years. Pt did leave Cd unit yesterday AMA.  last drink this morning 7/25/23  Ciwa 14  Poor appetite   Poor sleep   Depression 10   Anxiety 10  Denies any other substance use   Pt states recent stressors are being homeless, and  cheating on her.

## 2023-07-27 LAB
ANION GAP SERPL CALCULATED.3IONS-SCNC: 11.4 MMOL/L (ref 5–15)
BUN SERPL-MCNC: 5 MG/DL (ref 6–20)
BUN/CREAT SERPL: 7.8 (ref 7–25)
CALCIUM SPEC-SCNC: 8.8 MG/DL (ref 8.6–10.5)
CHLORIDE SERPL-SCNC: 103 MMOL/L (ref 98–107)
CO2 SERPL-SCNC: 18.6 MMOL/L (ref 22–29)
CREAT SERPL-MCNC: 0.64 MG/DL (ref 0.57–1)
EGFRCR SERPLBLD CKD-EPI 2021: 106.5 ML/MIN/1.73
GLUCOSE SERPL-MCNC: 68 MG/DL (ref 65–99)
POTASSIUM SERPL-SCNC: 3.9 MMOL/L (ref 3.5–5.2)
SODIUM SERPL-SCNC: 133 MMOL/L (ref 136–145)
TSH SERPL DL<=0.05 MIU/L-ACNC: 62.21 UIU/ML (ref 0.27–4.2)

## 2023-07-27 PROCEDURE — 63710000001 PROMETHAZINE PER 25 MG: Performed by: PSYCHIATRY & NEUROLOGY

## 2023-07-27 PROCEDURE — 80048 BASIC METABOLIC PNL TOTAL CA: CPT | Performed by: PSYCHIATRY & NEUROLOGY

## 2023-07-27 PROCEDURE — 99232 SBSQ HOSP IP/OBS MODERATE 35: CPT | Performed by: PSYCHIATRY & NEUROLOGY

## 2023-07-27 PROCEDURE — 84443 ASSAY THYROID STIM HORMONE: CPT | Performed by: PSYCHIATRY & NEUROLOGY

## 2023-07-27 RX ORDER — CHLORDIAZEPOXIDE HYDROCHLORIDE 10 MG/1
10 CAPSULE, GELATIN COATED ORAL EVERY 8 HOURS SCHEDULED
Status: DISCONTINUED | OUTPATIENT
Start: 2023-07-27 | End: 2023-07-30

## 2023-07-27 RX ADMIN — LORAZEPAM 1.5 MG: 1 TABLET ORAL at 16:05

## 2023-07-27 RX ADMIN — Medication 100 MG: at 09:32

## 2023-07-27 RX ADMIN — Medication 2 TABLET: at 09:30

## 2023-07-27 RX ADMIN — LORAZEPAM 1.5 MG: 1 TABLET ORAL at 21:06

## 2023-07-27 RX ADMIN — LORAZEPAM 1.5 MG: 1 TABLET ORAL at 09:34

## 2023-07-27 RX ADMIN — CHLORDIAZEPOXIDE HYDROCHLORIDE 10 MG: 10 CAPSULE ORAL at 21:06

## 2023-07-27 RX ADMIN — Medication 1 MG: at 09:31

## 2023-07-27 RX ADMIN — LEVOTHYROXINE SODIUM 137.5 MCG: 125 TABLET ORAL at 09:31

## 2023-07-27 RX ADMIN — Medication 1 TABLET: at 09:31

## 2023-07-27 RX ADMIN — FLUOXETINE HYDROCHLORIDE 40 MG: 20 CAPSULE ORAL at 09:31

## 2023-07-27 RX ADMIN — CHLORDIAZEPOXIDE HYDROCHLORIDE 10 MG: 10 CAPSULE ORAL at 14:58

## 2023-07-27 RX ADMIN — PROMETHAZINE HYDROCHLORIDE 25 MG: 25 TABLET ORAL at 15:10

## 2023-07-27 NOTE — PLAN OF CARE
Problem: Adult Behavioral Health Plan of Care  Goal: Plan of Care Review  Outcome: Ongoing, Progressing  Flowsheets  Taken 7/27/2023 0042  Progress: improving  Outcome Evaluation: Pt rates anxiety 10/10, depression 10/10, Denies SI/HI/AVH, Reports poor appetite and poor sleep. CIWA 12.  Taken 7/26/2023 2012  Plan of Care Reviewed With: patient  Patient Agreement with Plan of Care: agrees  Goal: Patient-Specific Goal (Individualization)  Outcome: Ongoing, Progressing  Goal: Adheres to Safety Considerations for Self and Others  Outcome: Ongoing, Progressing  Intervention: Develop and Maintain Individualized Safety Plan  Recent Flowsheet Documentation  Taken 7/27/2023 0000 by Janeth Bloom RN  Safety Measures: safety rounds completed  Taken 7/26/2023 2200 by Janeth Bloom RN  Safety Measures: safety rounds completed  Taken 7/26/2023 2012 by Janeth Bloom RN  Safety Measures: safety rounds completed  Taken 7/26/2023 2000 by Janeth Bloom RN  Safety Measures: safety rounds completed  Goal: Absence of New-Onset Illness or Injury  Outcome: Ongoing, Progressing  Intervention: Identify and Manage Fall Risk  Recent Flowsheet Documentation  Taken 7/27/2023 0000 by Janeth Bloom RN  Safety Measures: safety rounds completed  Taken 7/26/2023 2200 by Janeth Bloom RN  Safety Measures: safety rounds completed  Taken 7/26/2023 2012 by Janeth Bloom RN  Safety Measures: safety rounds completed  Taken 7/26/2023 2000 by Janeth Bloom RN  Safety Measures: safety rounds completed  Goal: Optimized Coping Skills in Response to Life Stressors  Outcome: Ongoing, Progressing  Intervention: Promote Effective Coping Strategies  Recent Flowsheet Documentation  Taken 7/26/2023 2012 by Janeth Bloom RN  Supportive Measures:   verbalization of feelings encouraged   active listening utilized  Goal: Develops/Participates in Therapeutic [place] to Support Successful Transition  Outcome: Ongoing, Progressing  Intervention: Foster  Therapeutic [place]  Recent Flowsheet Documentation  Taken 7/26/2023 2012 by Janeth Bloom, RN  Trust Relationship/Rapport:   care explained   thoughts/feelings acknowledged   reassurance provided   questions encouraged   questions answered     Problem: Seizure Disorder Comorbidity  Goal: Maintenance of Seizure Control  Outcome: Ongoing, Progressing     Problem: Suicidal Behavior  Goal: Suicidal Behavior is Absent or Managed  Outcome: Ongoing, Progressing     Problem: Activity and Energy Impairment (Excessive Substance Use)  Goal: Optimized Energy Level (Excessive Substance Use)  Outcome: Ongoing, Progressing  Intervention: Optimize Energy Level  Recent Flowsheet Documentation  Taken 7/26/2023 2012 by Janeth Bloom, RN  Activity (Behavioral Health): up ad marianna  Diversional Activity:   art work   journaling   puzzles   reading   television     Problem: Behavior Regulation Impairment (Excessive Substance Use)  Goal: Improved Behavioral Control (Excessive Substance Use)  Outcome: Ongoing, Progressing     Problem: Decreased Participation and Engagement (Excessive Substance Use)  Goal: Increased Participation and Engagement (Excessive Substance Use)  Outcome: Ongoing, Progressing  Intervention: Facilitate Participation and Engagement  Recent Flowsheet Documentation  Taken 7/26/2023 2012 by Janeth Bloom RN  Supportive Measures:   verbalization of feelings encouraged   active listening utilized  Diversional Activity:   art work   journaling   puzzles   reading   television     Problem: Physiologic Impairment (Excessive Substance Use)  Goal: Improved Physiologic Symptoms (Excessive Substance Use)  Outcome: Ongoing, Progressing     Problem: Social, Occupational or Functional Impairment (Excessive Substance Use)  Goal: Enhanced Social, Occupational or Functional Skills (Excessive Substance Use)  Outcome: Ongoing, Progressing  Intervention: Promote Social, Occupational and Functional Ability  Recent Flowsheet  Documentation  Taken 7/26/2023 2012 by Janeth Bloom, RN  Trust Relationship/Rapport:   care explained   thoughts/feelings acknowledged   reassurance provided   questions encouraged   questions answered     Problem: Alcohol Withdrawal  Goal: Alcohol Withdrawal Symptom Control  Outcome: Ongoing, Progressing     Problem: Substance Misuse (Alcohol Withdrawal)  Goal: Readiness for Change Identified  Outcome: Ongoing, Progressing  Intervention: Partner to Facilitate Behavior Change  Recent Flowsheet Documentation  Taken 7/26/2023 2012 by Janeth Bloom RN  Supportive Measures:   verbalization of feelings encouraged   active listening utilized     Problem: Mood Impairment (Depressive Signs/Symptoms)  Goal: Improved Mood Symptoms (Depressive Signs/Symptoms)  Outcome: Ongoing, Progressing  Intervention: Promote Mood Improvement  Recent Flowsheet Documentation  Taken 7/26/2023 2012 by Janeth Bloom RN  Supportive Measures:   verbalization of feelings encouraged   active listening utilized  Diversional Activity:   art work   journaling   puzzles   reading   television     Problem: Nutrition Imbalance (Depressive Signs/Symptoms)  Goal: Optimized Nutrition Intake  Outcome: Ongoing, Progressing     Problem: Sleep Disturbance (Depressive Signs/Symptoms)  Goal: Improved Sleep (Depressive Signs/Symptoms)  Outcome: Ongoing, Progressing  Intervention: Promote Healthy Sleep Hygiene  Recent Flowsheet Documentation  Taken 7/26/2023 2012 by Janeth Bloom, RN  Sleep Hygiene Promotion:   awakenings minimized   relaxation techniques promoted   regular sleep pattern promoted   Goal Outcome Evaluation:  Plan of Care Reviewed With: patient  Patient Agreement with Plan of Care: agrees     Progress: improving  Outcome Evaluation: Pt rates anxiety 10/10, depression 10/10, Denies SI/HI/AVH, Reports poor appetite and poor sleep. CIWA 12.

## 2023-07-27 NOTE — PLAN OF CARE
Goal Outcome Evaluation:  Plan of Care Reviewed With: patient, guardian  Patient Agreement with Plan of Care: agrees  Consent Given to Review Plan with: No consents signed on this date.  Progress: improving  Outcome Evaluation: Therapist met with Patient one-on-one.    Problem: Adult Behavioral Health Plan of Care  Goal: Plan of Care Review  Outcome: Ongoing, Progressing  Flowsheets  Taken 7/27/2023 1557  Progress: improving  Plan of Care Reviewed With:   patient   guardian  Patient Agreement with Plan of Care: agrees  Outcome Evaluation: Therapist met with Patient one-on-one.  Taken 7/26/2023 1538  Consent Given to Review Plan with: No consents signed on this date.  Goal: Optimized Coping Skills in Response to Life Stressors  Outcome: Ongoing, Progressing  Flowsheets (Taken 7/27/2023 1557)  Optimized Coping Skills in Response to Life Stressors: making progress toward outcome  Intervention: Promote Effective Coping Strategies  Flowsheets (Taken 7/27/2023 1557)  Supportive Measures:   active listening utilized   counseling provided   decision-making supported   positive reinforcement provided   verbalization of feelings encouraged  Goal: Develops/Participates in Therapeutic [place] to Support Successful Transition  Outcome: Ongoing, Progressing  Flowsheets (Taken 7/27/2023 1557)  Develops/Participates in Therapeutic [place] to Support Successful Transition: making progress toward outcome  Intervention: Foster Therapeutic [place]  Flowsheets (Taken 7/27/2023 1557)  Trust Relationship/Rapport:   care explained   questions answered   choices provided   questions encouraged   emotional support provided   reassurance provided   empathic listening provided   thoughts/feelings acknowledged  Intervention: Mutually Develop Transition Plan  Flowsheets (Taken 7/27/2023 1557)  Transition Support:   community resources reviewed   follow-up care coordinated   crisis management plan promoted   follow-up care discussed   crisis  management plan verbalized    DATA: Therapist met with Patient individually this date. Patient agreeable to discuss current treatment progress and discharge concerns.    Patient will need to contact with the [place] [employer] after 11:00 PM tonight and speak with Officer Tom Dahl regarding her 's license.     CLINICAL MANUVERING/INTERVENTIONS:  Assisted Patient in processing session content; acknowledged and normalized Patient's thoughts, feelings, and concerns by utilizing a person-centered approach in efforts to build appropriate rapport and a positive therapeutic relationship with open and honest communication. Allowed Patient to ventilate regarding current stressors and triggers for negative emotions and thoughts in a safe nonjudgmental environment with unconditional positive regard, active listening skills, and empathy.     ASSESSMENT: Patient was seen today for a follow-up. She reports she doesn't feel well today. Patient remains worried about how she is going to get her car out of impound and continue the trip [place]. She denies having any money and is hopeful she will be able to have her ex- send her money to get the car out of impound. No luck in contacting him so far. Patient's motivation to remain sober appears minimal. Continues to adamantly decline rehab.     PLAN: Patient will continue stabilization. Patient will continue to receive services offered by Treatment Team.     No consents signed for aftercare.

## 2023-07-27 NOTE — PROGRESS NOTES
" Inpatient Psych Progress Note     Clinician: Raj Goyal MD  Admission Date: 7/25/2023  07:53 EDT 07/27/23    Behavioral Health Treatment Plan and Problem List: I have reviewed and approved the Behavioral Health Treatment Plan and Problem list.    Allergies  No Known Allergies    Hospital Day: 2 days      Assessment completed within view of staff    History  CC/clinical focus: SI    Interval HPI: Patient seen and evaluated by me.  Chart reviewed.  Patient denies SI this morning but feeling profoundly depressed, rates her level of depression and 10 out of 10.  She is also continuing to experience significant symptoms of alcohol withdrawal.  Med Compliant.    Review of Systems   Constitutional:  Positive for chills, diaphoresis and fatigue.   HENT: Negative.     Eyes: Negative.    Respiratory: Negative.     Cardiovascular: Negative.    Gastrointestinal: Negative.    Endocrine: Negative.    Genitourinary: Negative.    Musculoskeletal: Negative.    Skin: Negative.    Allergic/Immunologic: Negative.    Neurological:  Positive for tremors and weakness.   Hematological: Negative.       /56 (BP Location: Right arm, Patient Position: Lying)   Pulse 54   Temp 97.8 øF (36.6 øC) (Temporal)   Resp 18   Ht 157.5 cm (62\")   Wt 65.9 kg (145 lb 3.2 oz)   SpO2 97%   BMI 26.56 kg/mý     Mental Status Exam  Mood: depressed  Affect: mood-congruent   Thought Processes: linear  Thought Content: negativistic  Hallucinations: no  Suicidal Thoughts: denies  Suicidal Plan/Intent: denies  Hopelesness:Severe  Homicidal Thoughts:  denies      Medical Decision Making:   Labs:     Lab Results (last 24 hours)       Procedure Component Value Units Date/Time    TSH [[id number]]  (Abnormal) Collected: 07/27/23 0518    Specimen: Blood Updated: 07/27/23 0721     TSH 62.210 uIU/mL     Basic Metabolic Panel [[id number]]  (Abnormal) Collected: 07/27/23 0518    Specimen: Blood Updated: 07/27/23 0717     Glucose 68 mg/dL      BUN 5 mg/dL      " Creatinine 0.64 mg/dL      Sodium 133 mmol/L      Potassium 3.9 mmol/L      Comment: Slight hemolysis detected by analyzer. Results may be affected.        Chloride 103 mmol/L      CO2 18.6 mmol/L      Calcium 8.8 mg/dL      BUN/Creatinine Ratio 7.8     Anion Gap 11.4 mmol/L      eGFR 106.5 mL/min/1.73     Narrative:      GFR Normal >60  Chronic Kidney Disease <60  Kidney Failure <15                Radiology:     Imaging Results (Last 24 Hours)       ** No results found for the last 24 hours. **              EKG:     ECG/EMG Results (most recent)       Procedure Component Value Units Date/Time    ECG 12 Lead Other [[id number]] Collected: 07/26/23 0404     Updated: 07/26/23 1252     QT Interval 466 ms      QTC Interval 495 ms     Narrative:      Test Reason : Baseline Cardiac Status~  Blood Pressure :   */*   mmHG  Vent. Rate :  68 BPM     Atrial Rate :  68 BPM     P-R Int : 144 ms          QRS Dur :  80 ms      QT Int : 466 ms       P-R-T Axes :  42  28  39 degrees     QTc Int : 495 ms    Normal sinus rhythm  Prolonged QT  Abnormal ECG  When compared with ECG of 25-JUL-2023 21:40, (Unconfirmed)  Criteria for Septal infarct are no longer present  Confirmed by Jason Billingsley ([id number]) on 7/26/2023 12:43:55 PM    Referred By: KIM PLATA           Confirmed By: Jason Billingsley             Medications:  B-complex with vitamin C, 2 tablet, Oral, Daily  chlordiazePOXIDE, 25 mg, Oral, Q8H  FLUoxetine, 40 mg, Oral, Daily  folic acid, 1 mg, Oral, Daily  LORazepam, 1.5 mg, Oral, 3 times per day   Followed by  [START ON 7/28/2023] LORazepam, 1 mg, Oral, 3 times per day   Followed by  [START ON 7/29/2023] LORazepam, 0.5 mg, Oral, 3 times per day  multivitamin with minerals, 1 tablet, Oral, Daily  nicotine, 1 patch, Transdermal, Q24H  thiamine, 100 mg, Oral, Daily           All medications reviewed.      Assessment and Plan:    Major Depressive Disorder, recurrrent, severe without psychotic features   - Patient was off home  dose of prozac 40mg daily for 1 week.  We restarted at 40mg on admission, with plan to increase to 60mg Daily to abraham     Alcohol Dependence, Severe  Alcohol Withdrawal  - Ativan Detox protocol  - Patient is experiencing significant withdrawal Sx at this time and has a h/o Delirium Tremens.  Added Librium, lower to 10mg TID today, taper manually.  - Thiamine         Hypokalemia  - 3.4 on admission  - Improved this morning, 3.9    Hyponatremia  - 133 this AM  - encourage po intake and check BMP in AM     Elevated LFT's  - AST/ALT 47/47 on admission  - Likely secondary to alcohol use, monitor     Hypothyroidism  - TSH 62.210  - Chart reviewed, most recent documented dose of synthroid appears to be 137mcg, patient has been off this medication for weeks, if not months during her recent binge of alcohol.    - Will restart synthroid 137mcg  - Outpatient follow up                  Continue hospitalization for safety and stabilization.  Continue current level of Special Precautions (q15 minute checks).

## 2023-07-27 NOTE — PLAN OF CARE
Goal Outcome Evaluation:  Plan of Care Reviewed With: patient  Patient Agreement with Plan of Care: agrees     Progress: no change  Outcome Evaluation: Pt has been cooperative, isolates to room for most of the shift, coming out to watch TV during the afternoon. Pt rates anxiety 10, depression 10, cravings 10, states sleep and appetite are poor. Pt states her withdrawal symptoms are nausea, tremors, and chills. Pt denies SI/HI/AVH, voices no complaints to this RN.

## 2023-07-28 LAB
ANION GAP SERPL CALCULATED.3IONS-SCNC: 9.8 MMOL/L (ref 5–15)
BUN SERPL-MCNC: 7 MG/DL (ref 6–20)
BUN/CREAT SERPL: 11.5 (ref 7–25)
CALCIUM SPEC-SCNC: 9 MG/DL (ref 8.6–10.5)
CHLORIDE SERPL-SCNC: 106 MMOL/L (ref 98–107)
CO2 SERPL-SCNC: 23.2 MMOL/L (ref 22–29)
CREAT SERPL-MCNC: 0.61 MG/DL (ref 0.57–1)
EGFRCR SERPLBLD CKD-EPI 2021: 107.7 ML/MIN/1.73
GLUCOSE SERPL-MCNC: 83 MG/DL (ref 65–99)
POTASSIUM SERPL-SCNC: 3.4 MMOL/L (ref 3.5–5.2)
SODIUM SERPL-SCNC: 139 MMOL/L (ref 136–145)

## 2023-07-28 PROCEDURE — 99232 SBSQ HOSP IP/OBS MODERATE 35: CPT | Performed by: PSYCHIATRY & NEUROLOGY

## 2023-07-28 PROCEDURE — 80048 BASIC METABOLIC PNL TOTAL CA: CPT | Performed by: PSYCHIATRY & NEUROLOGY

## 2023-07-28 RX ADMIN — Medication 100 MG: at 08:46

## 2023-07-28 RX ADMIN — CHLORDIAZEPOXIDE HYDROCHLORIDE 10 MG: 10 CAPSULE ORAL at 13:39

## 2023-07-28 RX ADMIN — Medication 2 TABLET: at 08:46

## 2023-07-28 RX ADMIN — LORAZEPAM 1 MG: 1 TABLET ORAL at 08:46

## 2023-07-28 RX ADMIN — Medication 1 TABLET: at 08:46

## 2023-07-28 RX ADMIN — LORAZEPAM 1 MG: 1 TABLET ORAL at 21:00

## 2023-07-28 RX ADMIN — CHLORDIAZEPOXIDE HYDROCHLORIDE 10 MG: 10 CAPSULE ORAL at 21:00

## 2023-07-28 RX ADMIN — Medication 1 MG: at 08:46

## 2023-07-28 RX ADMIN — FLUOXETINE HYDROCHLORIDE 40 MG: 20 CAPSULE ORAL at 08:46

## 2023-07-28 RX ADMIN — LEVOTHYROXINE SODIUM 137.5 MCG: 125 TABLET ORAL at 06:22

## 2023-07-28 RX ADMIN — LORAZEPAM 1 MG: 1 TABLET ORAL at 14:52

## 2023-07-28 NOTE — NURSING NOTE
Obtained consent from Patient to speak to [place] police department regarding pts belongings.  Officer Tom Hesham stated that he did have her 's license and would bring them and drop it off at the ED. Stated that patients car is  located at [facility] and the rest of her possessions are locked up in the car.

## 2023-07-28 NOTE — PROGRESS NOTES
" Inpatient Psych Progress Note     Clinician: Raj Goyal MD  Admission Date: 7/25/2023  08:05 EDT 07/28/23    Behavioral Health Treatment Plan and Problem List: I have reviewed and approved the Behavioral Health Treatment Plan and Problem list.    Allergies  No Known Allergies    Hospital Day: 3 days      Assessment completed within view of staff    History  CC/clinical focus:SI    Interval HPI: Patient seen and evaluated by me.  Chart reviewed. Patient reports experiencing some hallucinations last night.  She continues to experience significant Sx of alcohol withdrawal as documented in the review of systems below.  She rates her current level depression at 6 out of 10.  Denies suicidal ideation this morning.  Med Compliant.    Review of Systems   Constitutional:  Positive for chills, diaphoresis and fatigue.   HENT: Negative.     Eyes: Negative.    Respiratory: Negative.     Cardiovascular: Negative.    Gastrointestinal: Negative.    Endocrine: Negative.    Genitourinary: Negative.    Musculoskeletal: Negative.    Skin: Negative.    Allergic/Immunologic: Negative.    Neurological:  Positive for tremors and weakness.   Hematological: Negative.       BP 92/58 (BP Location: Right arm, Patient Position: Lying)   Pulse 60   Temp 97.9 øF (36.6 øC) (Temporal)   Resp 18   Ht 157.5 cm (62\")   Wt 65.9 kg (145 lb 3.2 oz)   SpO2 97%   BMI 26.56 kg/mý     Mental Status Exam  Mood: depressed  Affect: mood-congruent   Thought Processes: linear  Thought Content: normal  Hallucinations: no  Suicidal Thoughts: denies  Suicidal Plan/Intent: denies  Hopelesness:Moderate  Homicidal Thoughts:  denies      Medical Decision Making:   Labs:     Lab Results (last 24 hours)       Procedure Component Value Units Date/Time    Basic Metabolic Panel [[id number]]  (Abnormal) Collected: 07/28/23 0523    Specimen: Blood Updated: 07/28/23 0555     Glucose 83 mg/dL      BUN 7 mg/dL      Creatinine 0.61 mg/dL      Sodium 139 mmol/L      " Potassium 3.4 mmol/L      Chloride 106 mmol/L      CO2 23.2 mmol/L      Calcium 9.0 mg/dL      BUN/Creatinine Ratio 11.5     Anion Gap 9.8 mmol/L      eGFR 107.7 mL/min/1.73     Narrative:      GFR Normal >60  Chronic Kidney Disease <60  Kidney Failure <15                Radiology:     Imaging Results (Last 24 Hours)       ** No results found for the last 24 hours. **              EKG:     ECG/EMG Results (most recent)       Procedure Component Value Units Date/Time    ECG 12 Lead Other [[id number]] Collected: 07/26/23 0404     Updated: 07/26/23 1252     QT Interval 466 ms      QTC Interval 495 ms     Narrative:      Test Reason : Baseline Cardiac Status~  Blood Pressure :   */*   mmHG  Vent. Rate :  68 BPM     Atrial Rate :  68 BPM     P-R Int : 144 ms          QRS Dur :  80 ms      QT Int : 466 ms       P-R-T Axes :  42  28  39 degrees     QTc Int : 495 ms    Normal sinus rhythm  Prolonged QT  Abnormal ECG  When compared with ECG of 25-JUL-2023 21:40, (Unconfirmed)  Criteria for Septal infarct are no longer present  Confirmed by Jason Billingsley (2020) on 7/26/2023 12:43:55 PM    Referred By: KIM PLATA           Confirmed By: Jason Billingsley             Medications:  B-complex with vitamin C, 2 tablet, Oral, Daily  chlordiazePOXIDE, 10 mg, Oral, Q8H  FLUoxetine, 40 mg, Oral, Daily  folic acid, 1 mg, Oral, Daily  levothyroxine, 137.5 mcg, Oral, Q AM  LORazepam, 1 mg, Oral, 3 times per day   Followed by  [START ON 7/29/2023] LORazepam, 0.5 mg, Oral, 3 times per day  multivitamin with minerals, 1 tablet, Oral, Daily  nicotine, 1 patch, Transdermal, Q24H  thiamine, 100 mg, Oral, Daily           All medications reviewed.      Assessment and Plan:      Major Depressive Disorder, recurrrent, severe without psychotic features   - Increase prozac to 60mg Daily     Alcohol Dependence, Severe  Alcohol Withdrawal  - Ativan Detox protocol  - Patient continues to experience significant withdrawal Sx.  Will continue librium  at a dose of 10mg TID today, taper manually.  - Thiamine         Hypokalemia  - 3.4 on admission, improved to 3.9 on 7/27, decreased back to 3.4 on 7/28  - Monitor, encourage po intake     Hyponatremia  - 133 on 7/27, improved to 139 on 7/8  - encourage po intake, monitor     Elevated LFT's  - AST/ALT 47/47 on admission  - Likely secondary to alcohol use, monitor     Hypothyroidism  - TSH 62.210  - Chart reviewed, most recent documented dose of synthroid appears to be 137mcg, patient has been off this medication for weeks, if not months during her recent binge of alcohol.    - Restarted synthroid 137mcg  - Outpatient follow up       Continue hospitalization for safety and stabilization.  Continue current level of Special Precautions (q15 minute checks).

## 2023-07-28 NOTE — PLAN OF CARE
Goal Outcome Evaluation:  Plan of Care Reviewed With: patient  Patient Agreement with Plan of Care: agrees     Progress: improving  Outcome Evaluation: Patient withdrawn, reports medications are helping. Patient denies suicidal or homicidal ideation

## 2023-07-28 NOTE — PLAN OF CARE
Goal Outcome Evaluation:  Plan of Care Reviewed With: patient  Patient Agreement with Plan of Care: agrees  Consent Given to Review Plan with: No consents signed on this date.  Progress: improving  Outcome Evaluation: Therapist met with Patient one-on-one.    Problem: Adult Behavioral Health Plan of Care  Goal: Plan of Care Review  Outcome: Ongoing, Progressing  Flowsheets  Taken 7/28/2023 1114  Progress: improving  Plan of Care Reviewed With: patient  Patient Agreement with Plan of Care: agrees  Outcome Evaluation: Therapist met with Patient one-on-one.  Taken 7/26/2023 1538  Consent Given to Review Plan with: No consents signed on this date.  Goal: Optimized Coping Skills in Response to Life Stressors  Outcome: Ongoing, Progressing  Flowsheets (Taken 7/28/2023 1114)  Optimized Coping Skills in Response to Life Stressors: making progress toward outcome  Intervention: Promote Effective Coping Strategies  Flowsheets (Taken 7/28/2023 1114)  Supportive Measures:   active listening utilized   counseling provided   decision-making supported   positive reinforcement provided   verbalization of feelings encouraged  Goal: Develops/Participates in Therapeutic [place] to Support Successful Transition  Outcome: Ongoing, Progressing  Flowsheets (Taken 7/28/2023 1114)  Develops/Participates in Therapeutic [place] to Support Successful Transition: making progress toward outcome  Intervention: Foster Therapeutic [place]  Flowsheets (Taken 7/28/2023 1114)  Trust Relationship/Rapport:   care explained   questions answered   choices provided   questions encouraged   emotional support provided   reassurance provided   empathic listening provided   thoughts/feelings acknowledged  Intervention: Mutually Develop Transition Plan  Flowsheets (Taken 7/28/2023 1114)  Transition Support:   community resources reviewed   follow-up care coordinated   crisis management plan promoted   follow-up care discussed   crisis management plan  verbalized    DATA: Therapist met with Patient individually this date. Patient agreeable to discuss current treatment progress and discharge concerns.     Therapist spoke with Behavioral Health Director, Adair Arteaga who states the hospital will be able to assist with transportation to [facility] and [facility] and then to the [employer], but that is all the help we can offer. We will also send a few sandwich boxes with Patient. Made Patient aware.     CLINICAL MANUVERING/INTERVENTIONS:  Assisted Patient in processing session content; acknowledged and normalized Patient's thoughts, feelings, and concerns by utilizing a person-centered approach in efforts to build appropriate rapport and a positive therapeutic relationship with open and honest communication. Allowed Patient to ventilate regarding current stressors and triggers for negative emotions and thoughts in a safe nonjudgmental environment with unconditional positive regard, active listening skills, and empathy.     ASSESSMENT: Patient was seen today for a follow-up. She reports improvement in mood and anxiety, but remains anxious regarding disposition plans and how she will get back to [place]. Patient states she has not been able to get in contact with her ex- to see if he will send her money to get her car out of impound. Despite much encouragement to seek residential treatment for substance use, Patient continues to adamantly decline, as well as refuses to go to a homeless shelter. Patient appears resourceful but exhibits poor motivation. Denies further SI.     PLAN: Patient will continue stabilization. Patient will continue to receive services offered by Treatment Team.     Transportation will be arranged to [facility] and [facility] and then to the [employer].     No consents for aftercare.

## 2023-07-28 NOTE — NURSING NOTE
Office Hesham called stated that he replayed the body camera and the patients drivers license is in the door of her  side door. Pt aware.

## 2023-07-28 NOTE — PLAN OF CARE
Problem: Adult Behavioral Health Plan of Care  Goal: Plan of Care Review  Outcome: Ongoing, Progressing  Flowsheets  Taken 7/28/2023 0036  Outcome Evaluation: Pt rated anxiety 10/10, depression 10/10, Pt denies SI/HI. Reports seeing shadow people and has conversations with them until they disappear.  Reports good appetite and poor sleep.  Taken 7/27/2023 2010  Plan of Care Reviewed With: patient  Patient Agreement with Plan of Care: agrees  Goal: Patient-Specific Goal (Individualization)  Outcome: Ongoing, Progressing  Goal: Adheres to Safety Considerations for Self and Others  Outcome: Ongoing, Progressing  Intervention: Develop and Maintain Individualized Safety Plan  Recent Flowsheet Documentation  Taken 7/28/2023 0000 by Janeth Bloom RN  Safety Measures: safety rounds completed  Taken 7/27/2023 2200 by Janeth Bloom RN  Safety Measures: safety rounds completed  Taken 7/27/2023 2010 by Janeth Bloom RN  Safety Measures: safety rounds completed  Taken 7/27/2023 2000 by Janeth Bloom RN  Safety Measures: safety rounds completed  Goal: Absence of New-Onset Illness or Injury  Outcome: Ongoing, Progressing  Intervention: Identify and Manage Fall Risk  Recent Flowsheet Documentation  Taken 7/28/2023 0000 by Janeth Bloom RN  Safety Measures: safety rounds completed  Taken 7/27/2023 2200 by Janeth Bloom RN  Safety Measures: safety rounds completed  Taken 7/27/2023 2010 by Janeth Bloom RN  Safety Measures: safety rounds completed  Taken 7/27/2023 2000 by Janeth Bloom RN  Safety Measures: safety rounds completed  Goal: Optimized Coping Skills in Response to Life Stressors  Outcome: Ongoing, Progressing  Intervention: Promote Effective Coping Strategies  Recent Flowsheet Documentation  Taken 7/27/2023 2010 by Janeth Bloom RN  Supportive Measures: active listening utilized  Goal: Develops/Participates in Therapeutic [place] to Support Successful Transition  Outcome: Ongoing, Progressing  Intervention:  Foster Therapeutic [place]  Recent Flowsheet Documentation  Taken 7/27/2023 2010 by Janeth Bloom RN  Trust Relationship/Rapport:   care explained   thoughts/feelings acknowledged     Problem: Seizure Disorder Comorbidity  Goal: Maintenance of Seizure Control  Outcome: Ongoing, Progressing     Problem: Suicidal Behavior  Goal: Suicidal Behavior is Absent or Managed  Outcome: Ongoing, Progressing     Problem: Activity and Energy Impairment (Excessive Substance Use)  Goal: Optimized Energy Level (Excessive Substance Use)  Outcome: Ongoing, Progressing  Intervention: Optimize Energy Level  Recent Flowsheet Documentation  Taken 7/27/2023 2010 by Janeth Bloom RN  Activity (Behavioral Health): up ad marianna  Diversional Activity:   art work   journaling   puzzles   television     Problem: Behavior Regulation Impairment (Excessive Substance Use)  Goal: Improved Behavioral Control (Excessive Substance Use)  Outcome: Ongoing, Progressing     Problem: Decreased Participation and Engagement (Excessive Substance Use)  Goal: Increased Participation and Engagement (Excessive Substance Use)  Outcome: Ongoing, Progressing  Intervention: Facilitate Participation and Engagement  Recent Flowsheet Documentation  Taken 7/27/2023 2010 by Janeth Bloom RN  Supportive Measures: active listening utilized  Diversional Activity:   art work   journaling   puzzles   television     Problem: Physiologic Impairment (Excessive Substance Use)  Goal: Improved Physiologic Symptoms (Excessive Substance Use)  Outcome: Ongoing, Progressing     Problem: Social, Occupational or Functional Impairment (Excessive Substance Use)  Goal: Enhanced Social, Occupational or Functional Skills (Excessive Substance Use)  Outcome: Ongoing, Progressing  Intervention: Promote Social, Occupational and Functional Ability  Recent Flowsheet Documentation  Taken 7/27/2023 2010 by Janeth Bloom RN  Trust Relationship/Rapport:   care explained   thoughts/feelings  acknowledged     Problem: Alcohol Withdrawal  Goal: Alcohol Withdrawal Symptom Control  Outcome: Ongoing, Progressing     Problem: Substance Misuse (Alcohol Withdrawal)  Goal: Readiness for Change Identified  Outcome: Ongoing, Progressing  Intervention: Partner to Facilitate Behavior Change  Recent Flowsheet Documentation  Taken 7/27/2023 2010 by Janeth Bloom RN  Supportive Measures: active listening utilized     Problem: Mood Impairment (Depressive Signs/Symptoms)  Goal: Improved Mood Symptoms (Depressive Signs/Symptoms)  Outcome: Ongoing, Progressing  Intervention: Promote Mood Improvement  Recent Flowsheet Documentation  Taken 7/27/2023 2010 by Janeth Bloom RN  Supportive Measures: active listening utilized  Diversional Activity:   art work   journaling   puzzles   television     Problem: Nutrition Imbalance (Depressive Signs/Symptoms)  Goal: Optimized Nutrition Intake  Outcome: Ongoing, Progressing     Problem: Sleep Disturbance (Depressive Signs/Symptoms)  Goal: Improved Sleep (Depressive Signs/Symptoms)  Outcome: Ongoing, Progressing  Intervention: Promote Healthy Sleep Hygiene  Recent Flowsheet Documentation  Taken 7/27/2023 2010 by Janeth Bloom RN  Sleep Hygiene Promotion:   awakenings minimized   relaxation techniques promoted   regular sleep pattern promoted   Goal Outcome Evaluation:  Plan of Care Reviewed With: patient  Patient Agreement with Plan of Care: agrees     Progress: improving  Outcome Evaluation: Pt rated anxiety 10/10, depression 10/10, Pt denies SI/HI. Reports seeing shadow people and has conversations with them until they disappear.  Reports good appetite and poor sleep.

## 2023-07-29 PROCEDURE — 99232 SBSQ HOSP IP/OBS MODERATE 35: CPT | Performed by: PSYCHIATRY & NEUROLOGY

## 2023-07-29 PROCEDURE — 63710000001 PROMETHAZINE PER 25 MG: Performed by: PSYCHIATRY & NEUROLOGY

## 2023-07-29 RX ADMIN — Medication 2 TABLET: at 08:52

## 2023-07-29 RX ADMIN — PROMETHAZINE HYDROCHLORIDE 25 MG: 25 TABLET ORAL at 02:10

## 2023-07-29 RX ADMIN — Medication 1 TABLET: at 08:55

## 2023-07-29 RX ADMIN — NICOTINE TRANSDERMAL SYSTEM 1 PATCH: 21 PATCH, EXTENDED RELEASE TRANSDERMAL at 08:55

## 2023-07-29 RX ADMIN — Medication 100 MG: at 08:55

## 2023-07-29 RX ADMIN — IBUPROFEN 400 MG: 400 TABLET, FILM COATED ORAL at 02:10

## 2023-07-29 RX ADMIN — FLUOXETINE HYDROCHLORIDE 40 MG: 20 CAPSULE ORAL at 08:55

## 2023-07-29 RX ADMIN — LEVOTHYROXINE SODIUM 137.5 MCG: 125 TABLET ORAL at 05:54

## 2023-07-29 RX ADMIN — Medication 1 MG: at 08:52

## 2023-07-29 RX ADMIN — CHLORDIAZEPOXIDE HYDROCHLORIDE 10 MG: 10 CAPSULE ORAL at 21:14

## 2023-07-29 RX ADMIN — LORAZEPAM 0.5 MG: 0.5 TABLET ORAL at 15:12

## 2023-07-29 RX ADMIN — CHLORDIAZEPOXIDE HYDROCHLORIDE 10 MG: 10 CAPSULE ORAL at 15:12

## 2023-07-29 RX ADMIN — LORAZEPAM 0.5 MG: 0.5 TABLET ORAL at 21:14

## 2023-07-29 RX ADMIN — LORAZEPAM 0.5 MG: 0.5 TABLET ORAL at 08:55

## 2023-07-29 NOTE — PLAN OF CARE
Goal Outcome Evaluation:  Plan of Care Reviewed With: patient  Patient Agreement with Plan of Care: agrees     Progress: improving  Outcome Evaluation: Patient calm and cooperative during this shift. She reports anxiety 10, Depression 10, and cravings 10. She reports difficulty falling asleep and staying asleep as well as poor appetite. Pt denies SI, HI, AVH.

## 2023-07-29 NOTE — PROGRESS NOTES
"      Inpatient Psych Progress Note     Clinician: Raj Goyal MD  Admission Date: 7/25/2023  07:12 EDT 07/29/23    Behavioral Health Treatment Plan and Problem List: I have reviewed and approved the Behavioral Health Treatment Plan and Problem list.    Allergies  No Known Allergies    Hospital Day: 4 days      Assessment completed within view of staff    History  CC/clinical focus: SI    Interval HPI: Patient seen and evaluated by me.  Chart reviewed. Patient with ongoing withdrawal symptoms today including chills, night sweats, dizziness, nausea and anxiety. She reports that hallucinations have resolved and she has not been hearing voices today. Denies SI/HI. She rates anxiety 10/10 this morning and depression 10/10.   Med Compliant.  ROS otherwise as below.      Interval Review of Systems:   General ROS: negative for - fever or malaise  Endocrine ROS: negative for - palpitations  Respiratory ROS: no cough, shortness of breath, or wheezing  Cardiovascular ROS: no chest pain or dyspnea on exertion  Gastrointestinal ROS: no abdominal pain,no black or bloody stools    /80 (BP Location: Right arm, Patient Position: Sitting)   Pulse 69   Temp 96.7 øF (35.9 øC) (Temporal)   Resp 18   Ht 157.5 cm (62\")   Wt 65.9 kg (145 lb 3.2 oz)   SpO2 98%   BMI 26.56 kg/mý     Mental Status Exam  Mood: depressed  Affect: mood-congruent   Thought Processes: linear, logical, and goal directed  Thought Content: normal  Hallucinations: no  Suicidal Thoughts: denies  Suicidal Plan/Intent: denies  Hopelesness:Moderate  Homicidal Thoughts:  denies      Medical Decision Making:   Labs:     Lab Results (last 24 hours)       ** No results found for the last 24 hours. **              Radiology:     Imaging Results (Last 24 Hours)       ** No results found for the last 24 hours. **              EKG:     ECG/EMG Results (most recent)       Procedure Component Value Units Date/Time    ECG 12 Lead Other [[id number]] Collected: " 07/26/23 0404     Updated: 07/26/23 1252     QT Interval 466 ms      QTC Interval 495 ms     Narrative:      Test Reason : Baseline Cardiac Status~  Blood Pressure :   */*   mmHG  Vent. Rate :  68 BPM     Atrial Rate :  68 BPM     P-R Int : 144 ms          QRS Dur :  80 ms      QT Int : 466 ms       P-R-T Axes :  42  28  39 degrees     QTc Int : 495 ms    Normal sinus rhythm  Prolonged QT  Abnormal ECG  When compared with ECG of 25-JUL-2023 21:40, (Unconfirmed)  Criteria for Septal infarct are no longer present  Confirmed by Jason Billingsley ([id number]) on 7/26/2023 12:43:55 PM    Referred By: KIM PLATA           Confirmed By: Jason Billingsley             Medications:  B-complex with vitamin C, 2 tablet, Oral, Daily  chlordiazePOXIDE, 10 mg, Oral, Q8H  FLUoxetine, 40 mg, Oral, Daily  folic acid, 1 mg, Oral, Daily  levothyroxine, 137.5 mcg, Oral, Q AM  LORazepam, 0.5 mg, Oral, 3 times per day  multivitamin with minerals, 1 tablet, Oral, Daily  nicotine, 1 patch, Transdermal, Q24H  thiamine, 100 mg, Oral, Daily           All medications reviewed.      Assessment and Plan:        Major Depressive Disorder, recurrrent, severe without psychotic features   - Prozac 60mg Daily     Alcohol Dependence, Severe  Alcohol Withdrawal  - Ativan Detox protocol  - Continue Librium taper due to ongoing significant withdrawal symptoms   - Thiamine      Prolonged Qtc  - QTc 495 on 07/26  - Repeat EKG in am     Hypokalemia  - 3.4 on admission, improved to 3.9 on 7/27, decreased back to 3.4 on 7/28  - Monitor, encourage po intake     Hyponatremia  - 133 on 7/27, improved to 139 on 7/8  - encourage po intake, monitor     Elevated LFT's  - AST/ALT 47/47 on admission  - Likely secondary to alcohol use, monitor     Hypothyroidism  - TSH 62.210  - Chart reviewed, most recent documented dose of synthroid appears to be 137mcg, patient has been off this medication for weeks, if not months during her recent binge of alcohol.    - Restarted  synthroid 137mcg  - Outpatient follow up      Continue hospitalization for safety and stabilization.  Continue current level of Special Precautions (q15 minute checks).        This note was generated by a scribe, Carlo Bey. The work documented in this note was completed, reviewed, and approved by the attending psychiatrist as designated Dr. Raj Goyal electronic signature.     IRaj MD, personally performed the services described in this documentation as scribed by the above named individual and is both accurate and complete.

## 2023-07-29 NOTE — PLAN OF CARE
Goal Outcome Evaluation:  Plan of Care Reviewed With: patient  Patient Agreement with Plan of Care: agrees     Progress: improving  Outcome Evaluation: Patient cooperative, withdrawn, oriented x 4 this shift, primarily remaining in room, states mediciatons are helping including for symptoms of withdrawal . Patient denies suicidal or homicidal ideation

## 2023-07-30 LAB
BACTERIA UR QL AUTO: ABNORMAL /HPF
BILIRUB UR QL STRIP: NEGATIVE
CLARITY UR: ABNORMAL
COD CRY URNS QL: ABNORMAL /HPF
COLOR UR: ABNORMAL
GLUCOSE UR STRIP-MCNC: NEGATIVE MG/DL
HGB UR QL STRIP.AUTO: NEGATIVE
HYALINE CASTS UR QL AUTO: ABNORMAL /LPF
KETONES UR QL STRIP: ABNORMAL
LEUKOCYTE ESTERASE UR QL STRIP.AUTO: ABNORMAL
NITRITE UR QL STRIP: NEGATIVE
PH UR STRIP.AUTO: 6 [PH] (ref 5–8)
PROT UR QL STRIP: NEGATIVE
RBC # UR STRIP: ABNORMAL /HPF
REF LAB TEST METHOD: ABNORMAL
SP GR UR STRIP: 1.02 (ref 1–1.03)
SQUAMOUS #/AREA URNS HPF: ABNORMAL /HPF
UROBILINOGEN UR QL STRIP: ABNORMAL
WBC # UR STRIP: ABNORMAL /HPF

## 2023-07-30 PROCEDURE — 93005 ELECTROCARDIOGRAM TRACING: CPT | Performed by: PSYCHIATRY & NEUROLOGY

## 2023-07-30 PROCEDURE — 93010 ELECTROCARDIOGRAM REPORT: CPT | Performed by: INTERNAL MEDICINE

## 2023-07-30 PROCEDURE — 99232 SBSQ HOSP IP/OBS MODERATE 35: CPT | Performed by: PSYCHIATRY & NEUROLOGY

## 2023-07-30 PROCEDURE — 81001 URINALYSIS AUTO W/SCOPE: CPT | Performed by: PSYCHIATRY & NEUROLOGY

## 2023-07-30 PROCEDURE — 63710000001 PROMETHAZINE PER 25 MG: Performed by: PSYCHIATRY & NEUROLOGY

## 2023-07-30 RX ORDER — TRAZODONE HYDROCHLORIDE 50 MG/1
50 TABLET ORAL NIGHTLY
Status: DISCONTINUED | OUTPATIENT
Start: 2023-07-30 | End: 2023-07-30

## 2023-07-30 RX ORDER — CHLORDIAZEPOXIDE HYDROCHLORIDE 5 MG/1
5 CAPSULE, GELATIN COATED ORAL EVERY 8 HOURS SCHEDULED
Status: DISCONTINUED | OUTPATIENT
Start: 2023-07-30 | End: 2023-07-31 | Stop reason: HOSPADM

## 2023-07-30 RX ORDER — TRAZODONE HYDROCHLORIDE 50 MG/1
50 TABLET ORAL NIGHTLY PRN
Status: DISCONTINUED | OUTPATIENT
Start: 2023-07-30 | End: 2023-07-31 | Stop reason: HOSPADM

## 2023-07-30 RX ORDER — NITROFURANTOIN 25; 75 MG/1; MG/1
100 CAPSULE ORAL EVERY 12 HOURS SCHEDULED
Status: DISCONTINUED | OUTPATIENT
Start: 2023-07-30 | End: 2023-07-31 | Stop reason: HOSPADM

## 2023-07-30 RX ADMIN — CHLORDIAZEPOXIDE HYDROCHLORIDE 5 MG: 5 CAPSULE ORAL at 14:55

## 2023-07-30 RX ADMIN — Medication 1 MG: at 10:17

## 2023-07-30 RX ADMIN — FLUOXETINE HYDROCHLORIDE 40 MG: 20 CAPSULE ORAL at 10:18

## 2023-07-30 RX ADMIN — Medication 100 MG: at 10:18

## 2023-07-30 RX ADMIN — TRAZODONE HYDROCHLORIDE 50 MG: 50 TABLET ORAL at 01:29

## 2023-07-30 RX ADMIN — PROMETHAZINE HYDROCHLORIDE 25 MG: 25 TABLET ORAL at 01:29

## 2023-07-30 RX ADMIN — Medication 1 TABLET: at 10:18

## 2023-07-30 RX ADMIN — Medication 2 TABLET: at 10:17

## 2023-07-30 RX ADMIN — NICOTINE TRANSDERMAL SYSTEM 1 PATCH: 21 PATCH, EXTENDED RELEASE TRANSDERMAL at 10:18

## 2023-07-30 RX ADMIN — NITROFURANTOIN MONOHYDRATE/MACROCRYSTALLINE 100 MG: 25; 75 CAPSULE ORAL at 21:56

## 2023-07-30 RX ADMIN — LEVOTHYROXINE SODIUM 137.5 MCG: 125 TABLET ORAL at 05:45

## 2023-07-30 NOTE — NURSING NOTE
Patient persistently complaining of symptoms of UTI and requesting medication. Pt reports  burning with urination and frequency/urgency.     Spoke to Dr. RYANNE Goyal He ordered UA. Will continue to monitor.

## 2023-07-30 NOTE — PLAN OF CARE
"Goal Outcome Evaluation:  Plan of Care Reviewed With: patient  Patient Agreement with Plan of Care: agrees     Progress: improving  Outcome Evaluation: Patient is calm and cooperative during this shift. Pt reports excessive sleep, \"sleeping all day\", appetite is okay. Rates anxiety 10, depression 10, cravings 10. Patient states mood as \"depressed\". Disoriented to time. c/o UTI symptoms and nausea. CIWA score 7. Denies SI, HI or AVH.         "

## 2023-07-30 NOTE — NURSING NOTE
Pt at  asking for something for sleep, orders checked and pt had no sleeping medications. Dr Goyal was called and new orders received.

## 2023-07-30 NOTE — PROGRESS NOTES
"      Inpatient Psych Progress Note     Clinician: Raj Goyal MD  Admission Date: 7/25/2023  07:35 EDT 07/30/23    Behavioral Health Treatment Plan and Problem List: I have reviewed and approved the Behavioral Health Treatment Plan and Problem list.    Allergies  No Known Allergies    Hospital Day: 5 days      Assessment completed within view of staff    History  CC/clinical focus: SI    Interval HPI: Patient seen and evaluated by me.  Chart reviewed. Patient seen in room today, she is resting in bed. She states that she is \"okay.\" She c/o significant drowsiness with medications. She subjectively rates depression as 8/10. She admits to sweating and mild shaking.   Med Compliant.  ROS otherwise as below.      Interval Review of Systems:   General ROS: negative for - fever or malaise  Endocrine ROS: negative for - palpitations  Respiratory ROS: no cough, shortness of breath, or wheezing  Cardiovascular ROS: no chest pain or dyspnea on exertion  Gastrointestinal ROS: no abdominal pain,no black or bloody stools    BP 96/66 (BP Location: Right arm, Patient Position: Sitting)   Pulse 73   Temp 97.5 øF (36.4 øC) (Temporal)   Resp 18   Ht 157.5 cm (62\")   Wt 65.9 kg (145 lb 3.2 oz)   SpO2 95%   BMI 26.56 kg/mý     Mental Status Exam  Mood: depressed  Affect: mood-congruent   Thought Processes: linear, logical, and goal directed  Thought Content: normal  Hallucinations: no  Suicidal Thoughts: denies  Suicidal Plan/Intent: denies  Hopelesness:Moderate  Homicidal Thoughts:  denies      Medical Decision Making:   Labs:     Lab Results (last 24 hours)       ** No results found for the last 24 hours. **              Radiology:     Imaging Results (Last 24 Hours)       ** No results found for the last 24 hours. **              EKG:     ECG/EMG Results (most recent)       Procedure Component Value Units Date/Time    ECG 12 Lead Other [[id number]] Collected: 07/26/23 0404     Updated: 07/26/23 1252     QT Interval 466 ms  "     QTC Interval 495 ms     Narrative:      Test Reason : Baseline Cardiac Status~  Blood Pressure :   */*   mmHG  Vent. Rate :  68 BPM     Atrial Rate :  68 BPM     P-R Int : 144 ms          QRS Dur :  80 ms      QT Int : 466 ms       P-R-T Axes :  42  28  39 degrees     QTc Int : 495 ms    Normal sinus rhythm  Prolonged QT  Abnormal ECG  When compared with ECG of 25-JUL-2023 21:40, (Unconfirmed)  Criteria for Septal infarct are no longer present  Confirmed by Jason Billingsley ([id number]) on 7/26/2023 12:43:55 PM    Referred By: KIM PLATA           Confirmed By: Jason Subjiananiyam    ECG 12 Lead QT Measurement [[id number]] Collected: 07/30/23 0420     Updated: 07/30/23 0421     QT Interval 488 ms      QTC Interval 488 ms     Narrative:      Test Reason : QT Measurement  Blood Pressure :   */*   mmHG  Vent. Rate :  60 BPM     Atrial Rate :  60 BPM     P-R Int : 152 ms          QRS Dur :  78 ms      QT Int : 488 ms       P-R-T Axes :  50  29  25 degrees     QTc Int : 488 ms    Normal sinus rhythm  Nonspecific T wave abnormality  Abnormal ECG  When compared with ECG of 26-JUL-2023 04:04,  Nonspecific T wave abnormality, worse in Anterolateral leads    Referred By: LOUIS CLOUD           Confirmed By:              Medications:  B-complex with vitamin C, 2 tablet, Oral, Daily  chlordiazePOXIDE, 10 mg, Oral, Q8H  FLUoxetine, 40 mg, Oral, Daily  folic acid, 1 mg, Oral, Daily  levothyroxine, 137.5 mcg, Oral, Q AM  multivitamin with minerals, 1 tablet, Oral, Daily  nicotine, 1 patch, Transdermal, Q24H  thiamine, 100 mg, Oral, Daily           All medications reviewed.      Assessment and Plan:     Major Depressive Disorder, recurrrent, severe without psychotic features   - Prozac 60mg Daily     Alcohol Dependence, Severe  Alcohol Withdrawal  - Ativan Detox protocol  - Continue Librium taper due to ongoing significant withdrawal symptoms - will decrease today to 5mg TID  - Thiamine      Prolonged Qtc  - QTc 495 on 07/26  -  Improved with QTc 488 on repeat EKG 07/30 (also noted non-specific T-wave abnormality), will need to continue to monitor   - Check repeat EKG in AM     Hypokalemia  - 3.4 on admission, improved to 3.9 on 7/27, decreased back to 3.4 on 7/28  - Monitor, encourage po intake  - repeat BMP in AM     Hyponatremia  - 133 on 7/27, improved to 139 on 7/8  - encourage po intake, monitor  - repeat BMP in AM     Elevated LFT's  - AST/ALT 47/47 on admission  - Likely secondary to alcohol use, monitor     Hypothyroidism  - TSH 62.210  - Chart reviewed, most recent documented dose of synthroid appears to be 137mcg, patient has been off this medication for weeks, if not months during her recent binge of alcohol.    - Restarted synthroid 137mcg  - Outpatient follow up         Continue hospitalization for safety and stabilization.  Continue current level of Special Precautions (q15 minute checks).        This note was generated by a scribe, Carlo Bey. The work documented in this note was completed, reviewed, and approved by the attending psychiatrist as designated Dr. Raj Goyal electronic signature.     I, Raj Goyal MD, personally performed the services described in this documentation as scribed by the above named individual and is both accurate and complete.

## 2023-07-30 NOTE — NURSING NOTE
Patient c/o nausea with no signs of vomiting, Requesting Phenergan often.     Spoke to Dr. RYANNE Goyal made him aware of Pt BP- 94/61 and laying in bed much of the day. Instructed to administer Phenergan 25 mg PO. New new orders at this time.

## 2023-07-30 NOTE — NURSING NOTE
Patient noted with large amount of urine in hat in room, states did not notify prior staff of specimen . This RN educated Patient on importance of providing clean specimen, verbalized understanding. Requested Patient to give clean specimen, verbalized understanding.

## 2023-07-30 NOTE — NURSING NOTE
Notified Dr. RYANNE Goyal of UA results including 3-5 wbc, Large Oxalate Crystals, complaint of urgency, frequency, obtained order for Macrobid 100 mg po bid for 4 days

## 2023-07-30 NOTE — NURSING NOTE
Notified Dr. RYANNE Goyal of EKG results, nomal sinus rhythm Nonspecific T wave abnormality, Abnomal ECG, patient denies complaints, no new orders at this time, will continue to monitor

## 2023-07-30 NOTE — PLAN OF CARE
Goal Outcome Evaluation:  Plan of Care Reviewed With: patient  Patient Agreement with Plan of Care: agrees     Progress: improving  Outcome Evaluation: Patient cooperative, withdrawn, continues to remain in room most of shift, coming to day room for meals, medication. Patient reports medications helping for withdrawl symptoms. Patient denies suicidal or homicidal. Patient was placed on Macrobid for urinary complaints, UTI

## 2023-07-30 NOTE — NURSING NOTE
Once again requested Patient provide urine when specimen when  able and alert staff, verbalized understanding

## 2023-07-31 VITALS
DIASTOLIC BLOOD PRESSURE: 77 MMHG | RESPIRATION RATE: 20 BRPM | HEIGHT: 62 IN | WEIGHT: 145.2 LBS | SYSTOLIC BLOOD PRESSURE: 121 MMHG | HEART RATE: 72 BPM | TEMPERATURE: 97.4 F | OXYGEN SATURATION: 99 % | BODY MASS INDEX: 26.72 KG/M2

## 2023-07-31 PROBLEM — F33.2 SEVERE EPISODE OF RECURRENT MAJOR DEPRESSIVE DISORDER, WITHOUT PSYCHOTIC FEATURES: Status: ACTIVE | Noted: 2023-07-31

## 2023-07-31 PROBLEM — R45.851 SUICIDAL IDEATION: Status: RESOLVED | Noted: 2023-07-25 | Resolved: 2023-07-31

## 2023-07-31 PROBLEM — F10.20 ALCOHOL USE DISORDER, SEVERE, DEPENDENCE: Status: ACTIVE | Noted: 2023-07-31

## 2023-07-31 PROBLEM — F10.939 ALCOHOL WITHDRAWAL: Status: RESOLVED | Noted: 2023-07-24 | Resolved: 2023-07-31

## 2023-07-31 PROBLEM — F10.121 ALCOHOL INTOXICATION DELIRIUM: Status: RESOLVED | Noted: 2023-07-22 | Resolved: 2023-07-31

## 2023-07-31 LAB
ANION GAP SERPL CALCULATED.3IONS-SCNC: 12.2 MMOL/L (ref 5–15)
BUN SERPL-MCNC: 9 MG/DL (ref 6–20)
BUN/CREAT SERPL: 18.8 (ref 7–25)
CALCIUM SPEC-SCNC: 8.4 MG/DL (ref 8.6–10.5)
CHLORIDE SERPL-SCNC: 109 MMOL/L (ref 98–107)
CO2 SERPL-SCNC: 20.8 MMOL/L (ref 22–29)
CREAT SERPL-MCNC: 0.48 MG/DL (ref 0.57–1)
EGFRCR SERPLBLD CKD-EPI 2021: 114.1 ML/MIN/1.73
GLUCOSE SERPL-MCNC: 77 MG/DL (ref 65–99)
POTASSIUM SERPL-SCNC: 3.8 MMOL/L (ref 3.5–5.2)
QT INTERVAL: 486 MS
QT INTERVAL: 488 MS
QTC INTERVAL: 486 MS
QTC INTERVAL: 488 MS
SODIUM SERPL-SCNC: 142 MMOL/L (ref 136–145)

## 2023-07-31 PROCEDURE — 99239 HOSP IP/OBS DSCHRG MGMT >30: CPT | Performed by: PSYCHIATRY & NEUROLOGY

## 2023-07-31 PROCEDURE — 80048 BASIC METABOLIC PNL TOTAL CA: CPT | Performed by: PSYCHIATRY & NEUROLOGY

## 2023-07-31 PROCEDURE — 93005 ELECTROCARDIOGRAM TRACING: CPT | Performed by: PSYCHIATRY & NEUROLOGY

## 2023-07-31 PROCEDURE — 93010 ELECTROCARDIOGRAM REPORT: CPT | Performed by: INTERNAL MEDICINE

## 2023-07-31 RX ORDER — LEVOTHYROXINE SODIUM 137 UG/1
137 TABLET ORAL
Qty: 30 TABLET | Refills: 0 | Status: SHIPPED | OUTPATIENT
Start: 2023-08-01

## 2023-07-31 RX ORDER — NITROFURANTOIN 25; 75 MG/1; MG/1
100 CAPSULE ORAL EVERY 12 HOURS SCHEDULED
Qty: 6 CAPSULE | Refills: 0 | Status: SHIPPED | OUTPATIENT
Start: 2023-07-31 | End: 2023-08-03

## 2023-07-31 RX ORDER — FLUOXETINE HYDROCHLORIDE 40 MG/1
40 CAPSULE ORAL DAILY
Qty: 30 CAPSULE | Refills: 0 | Status: SHIPPED | OUTPATIENT
Start: 2023-07-31

## 2023-07-31 RX ADMIN — NITROFURANTOIN MONOHYDRATE/MACROCRYSTALLINE 100 MG: 25; 75 CAPSULE ORAL at 09:12

## 2023-07-31 RX ADMIN — TRAZODONE HYDROCHLORIDE 50 MG: 50 TABLET ORAL at 01:31

## 2023-07-31 RX ADMIN — Medication 100 MG: at 09:12

## 2023-07-31 RX ADMIN — Medication 1 TABLET: at 09:12

## 2023-07-31 RX ADMIN — FLUOXETINE HYDROCHLORIDE 40 MG: 20 CAPSULE ORAL at 09:12

## 2023-07-31 RX ADMIN — NICOTINE TRANSDERMAL SYSTEM 1 PATCH: 21 PATCH, EXTENDED RELEASE TRANSDERMAL at 09:12

## 2023-07-31 RX ADMIN — Medication 2 TABLET: at 09:12

## 2023-07-31 RX ADMIN — Medication 1 MG: at 09:12

## 2023-07-31 RX ADMIN — LEVOTHYROXINE SODIUM 137.5 MCG: 125 TABLET ORAL at 05:48

## 2023-07-31 NOTE — PLAN OF CARE
Goal Outcome Evaluation:           Progress: improving            Pt rated anxiety 10/10 and depression 10/10 during assessment. Was calm and cooperative. Pt denied SI, HI, and AVH. Appetite reported as poor and sleep fair.

## 2023-07-31 NOTE — PROGRESS NOTES
Psychiatry/Social Work    Patient Name:  Lorraine Lincoln  YOB: 1970  MRN: [id number]  Admit Date:  7/25/2023    Staffed with Dr. Agrawal. Patient is being discharged home on this date.     Patient adamantly and convinginly denies SI/HI/AVH and expresses readiness to be discharged.     Spoke with security who states they are unable to provide any transportation on this date.    Staffed with Behavioral Health Director, Adair Arteaga who states this Therapist, along with another staff member, can provide transportation.     Electronically signed by:  MANAS Proctor  07/31/23 11:12 EDT

## 2023-07-31 NOTE — PLAN OF CARE
Goal Outcome Evaluation:  Plan of Care Reviewed With: patient  Patient Agreement with Plan of Care: agrees     Progress: improving  Outcome Evaluation: pt. verbalzies ready to be discharge  rates depression 0 she verbalizes just upset cause can't find her clothes she came in with explained staff in process checking for her clothes ppt. discharged today .

## 2023-08-01 NOTE — DISCHARGE SUMMARY
"      PSYCHIATRIC DISCHARGE SUMMARY     Patient Identification:  Name:  Lorraine Lincoln  Age:  52 y.o.  Sex:  female  :  1970  MRN:  [id number]  Visit Number:  [id number]    Date of Admission:2023   Date of Discharge: 2023     Discharge Diagnosis:  Active Problems:    Severe episode of recurrent major depressive disorder, without psychotic features    Alcohol use disorder, severe, dependence      Admission Diagnosis:  Suicidal ideation [R45.851]     Hospital Course  Patient is a 52 y.o. female presented with suicidal ideation.  Admitted for crisis stabilization.  No acutely concerning labs at admission.  TSH found to be 62.2.  Patient placed on Ativan detox protocol with supplemental Librium for significant withdrawal symptoms.  Patient continued on fluoxetine 60 mg daily and home Synthroid 137 mcg every morning.  Patient a good participant in the milieu and reported gradual improvement of presenting symptoms.  She displayed no behavior concerning for harm to herself or others.  Recent distress appears largely related to recent life changes and ongoing alcohol abuse.  Patient planned to stay with her sister once she obtained her car from [facility].  Safety planning completed.  Patient appropriate for hospital discharge at this time.    On the day of discharge, patient denied SI, HI or AVH. Patient was stable and appropriate by the conclusion of this admission, denying significant symptoms of mood, psychotic or thought disorder. Patient showed improvement of presenting symptoms and was deemed appropriate for discharge today.    Mental Status Exam upon discharge:   Mood \"better\"   Affect-congruent, appropriate, stable  Thought Content-goal directed, no delusional material present  Thought process-linear, organized.  Suicidality: No SI  Homicidality: No HI  Perception: No AH/VH    Procedures Performed         Consults:   Consults       Date and Time Order Name Status Description    2023  8:33 AM " Inpatient Psychiatrist Consult Completed             Pertinent Test Results:   Lab Results (last 7 days)       Procedure Component Value Units Date/Time    Basic Metabolic Panel [[id number]]  (Abnormal) Collected: 07/31/23 0502    Specimen: Blood Updated: 07/31/23 0548     Glucose 77 mg/dL      BUN 9 mg/dL      Creatinine 0.48 mg/dL      Sodium 142 mmol/L      Potassium 3.8 mmol/L      Chloride 109 mmol/L      CO2 20.8 mmol/L      Calcium 8.4 mg/dL      BUN/Creatinine Ratio 18.8     Anion Gap 12.2 mmol/L      eGFR 114.1 mL/min/1.73     Narrative:      GFR Normal >60  Chronic Kidney Disease <60  Kidney Failure <15      Urinalysis With Culture If Indicated - Urine, Clean Catch [[id number]]  (Abnormal) Collected: 07/30/23 1525    Specimen: Urine, Clean Catch Updated: 07/30/23 1558     Color, UA Dark Yellow     Appearance, UA Cloudy     pH, UA 6.0     Specific Gravity, UA 1.017     Glucose, UA Negative     Ketones, UA Trace     Bilirubin, UA Negative     Blood, UA Negative     Protein, UA Negative     Leuk Esterase, UA Trace     Nitrite, UA Negative     Urobilinogen, UA 1.0 E.U./dL    Narrative:      In absence of clinical symptoms, the presence of pyuria, bacteria, and/or nitrites on the urinalysis result does not correlate with infection.    Urinalysis, Microscopic Only - Urine, Clean Catch [[id number]]  (Abnormal) Collected: 07/30/23 1525    Specimen: Urine, Clean Catch Updated: 07/30/23 1558     RBC, UA None Seen /HPF      WBC, UA 3-5 /HPF      Comment: Urine culture not indicated.        Bacteria, UA 1+ /HPF      Squamous Epithelial Cells, UA 3-6 /HPF      Hyaline Casts, UA None Seen /LPF      Calcium Oxalate Crystals, UA Large/3+ /HPF      Methodology Manual Light Microscopy    Basic Metabolic Panel [[id number]]  (Abnormal) Collected: 07/28/23 0523    Specimen: Blood Updated: 07/28/23 0555     Glucose 83 mg/dL      BUN 7 mg/dL      Creatinine 0.61 mg/dL      Sodium 139 mmol/L      Potassium 3.4 mmol/L      Chloride  106 mmol/L      CO2 23.2 mmol/L      Calcium 9.0 mg/dL      BUN/Creatinine Ratio 11.5     Anion Gap 9.8 mmol/L      eGFR 107.7 mL/min/1.73     Narrative:      GFR Normal >60  Chronic Kidney Disease <60  Kidney Failure <15      TSH [[id number]]  (Abnormal) Collected: 07/27/23 0518    Specimen: Blood Updated: 07/27/23 0721     TSH 62.210 uIU/mL     Basic Metabolic Panel [[id number]]  (Abnormal) Collected: 07/27/23 0518    Specimen: Blood Updated: 07/27/23 0717     Glucose 68 mg/dL      BUN 5 mg/dL      Creatinine 0.64 mg/dL      Sodium 133 mmol/L      Potassium 3.9 mmol/L      Comment: Slight hemolysis detected by analyzer. Results may be affected.        Chloride 103 mmol/L      CO2 18.6 mmol/L      Calcium 8.8 mg/dL      BUN/Creatinine Ratio 7.8     Anion Gap 11.4 mmol/L      eGFR 106.5 mL/min/1.73     Narrative:      GFR Normal >60  Chronic Kidney Disease <60  Kidney Failure <15              Condition on Discharge:  improved    Vital Signs       Discharge Disposition:  Home or Self Care    Discharge Medications:     Discharge Medications        New Medications        Instructions Start Date   levothyroxine 137 MCG tablet  Commonly known as: SYNTHROID, LEVOTHROID   137 mcg, Oral, Every Early Morning      nitrofurantoin (macrocrystal-monohydrate) 100 MG capsule  Commonly known as: MACROBID   100 mg, Oral, Every 12 Hours Scheduled             Continue These Medications        Instructions Start Date   FLUoxetine 40 MG capsule  Commonly known as: PROzac   40 mg, Oral, Daily      folic acid 1 MG tablet  Commonly known as: FOLVITE   1 mg, Oral, Daily      thiamine 100 MG tablet  tablet  Commonly known as: VITAMIN B-1   100 mg, Oral, Daily               Discharge Diet: Normal  Diet Instructions    Regular diet            Activity at Discharge: Normal  Activity Instructions    As tolerated            Follow-up Appointments  No future appointments.      Test Results Pending at Discharge  None     Time: I spent greater than 30  minutes on this discharge activity which included: face-to-face encounter with the patient, reviewing the data in the system, coordination of the care with the nursing staff as well as consultants, documentation, and entering orders.      Clinician:   Gallito Agrawal MD  08/01/23  13:33 EDT

## 2023-10-12 NOTE — DISCHARGE SUMMARY
[facility] DISCHARGE SUMMARY    Patient Identification:  Name:  Lorraine Lincoln  Age:  52 y.o.  Sex:  female  :  1970  MRN:  [id number]  Visit Number:  [id number]    Date of Admission: 2023  Date of Discharge:  2023     PCP: Provider, No Known    DISCHARGE DIAGNOSIS  Acute alcohol intoxication  Chronic alcohol use disorder  Acute alcohol withdrawal, currently mild  Alcohol induced hepatitis  Suicidal ideation, resolved  History of bipolar 1 disorder  History of anxiety/depression  History of hypothyroidism    CONSULTS       PROCEDURES PERFORMED      HOSPITAL COURSE  Patient is a 52 y.o. female presented to [facility] after being found passed out unresponsive in a local liquor store.  Please see the admitting history and physical for further details.      Patient was subsequently admitted to the hospitalist service for acute alcohol intoxication and treatment of expected alcohol withdrawal.  Patient admitted to the critical care unit and started on CIWA protocol.  Patient with anxiety and agitation and tremulousness throughout her hospitalization with only occasional episodes of delirium but usually redirectable.  Patient did frequently request to be able to leave the hospital to get her car however explained that her car was impounded and patient frequently asking for the hospital to provide a voucher so that she can get her car back.  Patient initially with suicidal ideation at time of admission however this quickly resolved and shortly after admission seen by psychiatry and denied any intent to harm herself and suicide precautions discontinued.  Patient's distress and brief comments regarding suicidal ideation was felt to be due to marital issues with her 's recent infidelity.  Patient did have a reported history of bipolar 1 disorder with anxiety and depression however given her chronic recurrent history of alcohol abuse a lot of this could be  Pt cleared for discharge. Rx sent to pharmacy. RN left vm for mrs pope, pt contact and spoke with second contact Love to inform that pt is ready to discharge today. Pt declined transport arrangement, states will take the [employer] bus. Declined to state if he would be able to safely gain entry to apartment. Rn was able to review discharge instructions, including needed prescriptions sent to pharmacy and ready for pickup. Pt states understanding. Public safety called as pt reported a gold looking chain with a gold and blue looking pendant as missing. Pt thought was in coat in belonging bag, but cannot find.    precipitated by her continued alcoholism.  Patient had had multiple ER admissions and evaluations in outside state of [place] due to acute alcohol intoxication as well as previous DUIs with a continued repeated behavior of poor choices.  On the day of discharge patient was wanting to leave the hospital AMA in order to smoke a cigarette.  Given patient is far from home with no family members to contact she is originally from [place] and was in route to see her sister in [place] every avenue of compromise was attempted with the patient to get her to stay and ultimately she was willing to stay and be transferred to [facility] acute detox center where she would be allowed to smoke.  Given that patient was medically stable and had been reevaluated by psychiatry and was agreeable to having the patient be excepted to the [facility] detox center patient was felt to have achieved maximal benefit of acute inpatient hospital admission and discharged in stable medical condition.  Of note patient later [facility] AMA and was notified by psychiatry and patient is extremely high risk for readmission given her continued poor choices and lack of interest in changing her repeated offenses of alcoholism and public intoxication with loss of consciousness.    VITAL SIGNS:  Temp:  [98.1 °F (36.7 °C)-99.1 °F (37.3 °C)] 98.1 °F (36.7 °C)  Heart Rate:  [63-90] 81  Resp:  [13-20] 16  BP: (114-136)/() 118/86  SpO2:  [97 %-100 %] 97 %  on   ;   Device (Oxygen Therapy): room air    Body mass index is 28.95 kg/m².  Wt Readings from Last 3 Encounters:   07/23/23 71.8 kg (158 lb 4.6 oz)       PHYSICAL EXAM:  Constitutional:  Well-developed and well-nourished.  No acute distress.      HENT:  Head:  Normocephalic and atraumatic.  Mouth:  Moist mucous membranes.    Eyes:  Conjunctivae and EOM are normal. No scleral icterus.    Cardiovascular:  Normal rate, regular rhythm and normal heart sounds with no  murmur.  Pulmonary/Chest:  No respiratory distress, no wheezes, no crackles, with normal breath sounds and good air movement.  Abdominal:  Soft.  Bowel sounds are normal.  No distension and no tenderness.   Musculoskeletal:  No tenderness and no deformity.  No red or swollen joints anywhere.  Functional ROM intact.   Neurological:  Alert and oriented to person, place, and time.  No cranial nerve deficit.  No tongue deviation.  No facial droop.  No slurred speech. Intact Sensation throughout  Skin:  Skin is warm and dry. No rash or lesion noted. No pallor.   Peripheral vascular:  Pulses in all 4 extremities with no clubbing, no cyanosis, no edema.  Psychiatric: Anxious and distressed but conversant and answering questions appropriately.     DISCHARGE DISPOSITION   Stable    DISCHARGE MEDICATIONS:     Discharge Medications        New Medications        Instructions Start Date   folic acid 1 MG tablet  Commonly known as: FOLVITE   1 mg, Oral, Daily      thiamine 100 MG tablet  Commonly known as: VITAMIN B1   100 mg, Oral, Daily   Start Date: July 27, 2023            Continue These Medications        Instructions Start Date   FLUoxetine 40 MG capsule  Commonly known as: PROzac   40 mg, Oral, Daily             Stop These Medications      gabapentin 300 MG capsule  Commonly known as: NEURONTIN     lamoTRIgine 200 MG tablet  Commonly known as: LaMICtal     Latuda 80 MG tablet tablet  Generic drug: Lurasidone HCl     propranolol 20 MG tablet  Commonly known as: INDERAL     traZODone 50 MG tablet  Commonly known as: DESYREL                 Follow-up Information       Provider, No Known .    Contact information:  [facility]  [place] [id number] [phone]                              TEST  RESULTS PENDING AT DISCHARGE       The ASCVD Risk score (Birgit DUTTA, et al., 2019) failed to calculate for the following reasons:    Cannot find a previous HDL lab    Cannot find a previous total cholesterol lab     CODE  STATUS  Code Status and Medical Interventions:   Ordered at: 07/22/23 0630     Code Status (Patient has no pulse and is not breathing):    CPR (Attempt to Resuscitate)     Medical Interventions (Patient has pulse or is breathing):    Full Support     Release to patient:    Routine Release       Brant Avila DO  [facility]  07/24/23  17:44 EDT    Please note that this discharge summary required more than 30 minutes to complete.